# Patient Record
Sex: MALE | Race: WHITE | NOT HISPANIC OR LATINO | Employment: FULL TIME | URBAN - METROPOLITAN AREA
[De-identification: names, ages, dates, MRNs, and addresses within clinical notes are randomized per-mention and may not be internally consistent; named-entity substitution may affect disease eponyms.]

---

## 2020-03-02 ENCOUNTER — OFFICE VISIT (OUTPATIENT)
Dept: OBGYN CLINIC | Facility: CLINIC | Age: 60
End: 2020-03-02
Payer: COMMERCIAL

## 2020-03-02 VITALS
SYSTOLIC BLOOD PRESSURE: 143 MMHG | DIASTOLIC BLOOD PRESSURE: 83 MMHG | HEIGHT: 72 IN | WEIGHT: 218.2 LBS | HEART RATE: 54 BPM | BODY MASS INDEX: 29.55 KG/M2

## 2020-03-02 DIAGNOSIS — M24.812 INTERNAL DERANGEMENT OF LEFT SHOULDER: Primary | ICD-10-CM

## 2020-03-02 DIAGNOSIS — M25.512 ACUTE PAIN OF LEFT SHOULDER: ICD-10-CM

## 2020-03-02 PROCEDURE — 99203 OFFICE O/P NEW LOW 30 MIN: CPT | Performed by: ORTHOPAEDIC SURGERY

## 2020-03-02 RX ORDER — VERAPAMIL HYDROCHLORIDE 120 MG/1
120 TABLET, FILM COATED ORAL 3 TIMES DAILY
COMMUNITY
End: 2020-03-09

## 2020-03-02 RX ORDER — LEVOTHYROXINE SODIUM 175 UG/1
175 TABLET ORAL DAILY
COMMUNITY

## 2020-03-02 RX ORDER — ROSUVASTATIN CALCIUM 10 MG/1
10 TABLET, COATED ORAL DAILY
COMMUNITY

## 2020-03-02 NOTE — PROGRESS NOTES
Assessment/Plan:  1  Internal derangement of left shoulder  MRI shoulder left wo contrast   2  Acute pain of left shoulder  XR shoulder 2+ vw left    MRI shoulder left wo contrast       Scribe Attestation    I,:   Lukasz Diaz am acting as a scribe while in the presence of the attending physician :        I,:   Anabel Hood MD personally performed the services described in this documentation    as scribed in my presence :              Judith Mcfadden upon examination and review of the x-ray report of his left shoulder does give me concern for traumatic tear of his rotator cuff  He demonstrates a positive drop-arm sign, and is severely weak into abduction  He does demonstrate weakness and pain into external rotation that further provokes concern for a large rotator cuff tear  Given the acute traumatic nature of his injury, and his dysfunction of the left shoulder on clinical examination today do feel an MRI of the left shoulder to question a traumatic rotator cuff tear is warranted at this time  I did provide a prescription for this today  My office will help facilitate his appointment  I would like Judith Bogdan to follow up with me when he has the MRI of his left shoulder completed  In the meantime he may use his arm to tolerance however advised against any significant overhead or heavy lifting until we are able to review the images of his MRI  I did remark that should the MRI demonstrate a large rotator cuff tear there is series consideration to be discussed in regards to undergoing a left shoulder arthroscopy with rotator cuff repair  Judith Mcfadden did verbalize understanding to all the information provided to him, and had no further questions  Subjective:   Torsten Gurrola is a 62 y o  male who presents to the office today for initial evaluation of his left shoulder  He states that he suffered a fall from an elevated position on Saturday while he was on a 3 ft step ladder working on his barn    He states that he fell onto the elbow which subsequently jammed his shoulder then fell onto the shoulder  He states that at that time he did not feel a crack, snap, or pop into the shoulder until he got up and moved his arm  He has since been experiencing intermittent and severe sharp pain at the anterior, lateral aspect of the shoulder that does extend into the upper arm  He states that he is unable to reach overhead due to severe pain, and stiffness  He is also unable to forward flex his shoulder again due to severe pain, and weakness  He does note that he had an injury to his shoulder in a similar fashion approximately 35 years ago however did not undergo any surgical treatment  He did receive a corticosteroid injection 6 months after that injury that did provided with great relief  Today he denies any distal paresthesias  Review of Systems   Constitutional: Negative for chills, fever and unexpected weight change  HENT: Negative for hearing loss, nosebleeds and sore throat  Eyes: Negative for pain, redness and visual disturbance  Respiratory: Positive for cough  Negative for shortness of breath and wheezing  Cardiovascular: Negative for chest pain, palpitations and leg swelling  Gastrointestinal: Negative for abdominal pain, nausea and vomiting  Endocrine: Negative for polyphagia and polyuria  Genitourinary: Negative for dysuria and hematuria  Musculoskeletal: Positive for arthralgias  See HPI   Skin: Negative for rash and wound  Neurological: Negative for dizziness, numbness and headaches  Psychiatric/Behavioral: Negative for decreased concentration and suicidal ideas  The patient is not nervous/anxious            Past Medical History:   Diagnosis Date    Hypercholesterolemia     Pre-diabetes        Past Surgical History:   Procedure Laterality Date    CHOLECYSTECTOMY      CYST REMOVAL      on chest    EYE SURGERY      FINGER SURGERY Left     index of left hand       Family History Problem Relation Age of Onset    Diabetes Mother     Cancer Mother     Diabetes Father     Cancer Father     No Known Problems Sister     No Known Problems Brother     No Known Problems Maternal Aunt     No Known Problems Maternal Uncle     No Known Problems Paternal Aunt     No Known Problems Paternal Uncle     No Known Problems Maternal Grandmother     No Known Problems Maternal Grandfather     No Known Problems Paternal Grandmother     No Known Problems Paternal Grandfather        Social History     Occupational History    Not on file   Tobacco Use    Smoking status: Never Smoker    Smokeless tobacco: Never Used   Substance and Sexual Activity    Alcohol use: Never     Frequency: Never    Drug use: Never    Sexual activity: Not on file         Current Outpatient Medications:     levothyroxine 175 mcg tablet, Take 175 mcg by mouth daily, Disp: , Rfl:     metFORMIN (GLUCOPHAGE) 1000 MG tablet, Take 1,000 mg by mouth daily, Disp: , Rfl:     rosuvastatin (CRESTOR) 10 MG tablet, Take 10 mg by mouth daily, Disp: , Rfl:     verapamil (CALAN) 120 mg tablet, Take 120 mg by mouth 3 (three) times a day, Disp: , Rfl:     No Known Allergies    Objective:  Vitals:    03/02/20 0958   BP: 143/83   Pulse: (!) 54       Left Shoulder Exam     Tenderness   Left shoulder tenderness location: greater tuberosity  Range of Motion   External rotation: 50   Internal rotation 90 degrees: 40     Muscle Strength   Abduction: 2/5   Internal rotation: 5/5   External rotation: 4/5   Biceps: 4/5     Tests   Drop arm: positive    Other   Erythema: absent  Scars: absent  Sensation: normal  Pulse: present     Comments:  Empty can test:  Positive 2/5 strength, as significant pain            Physical Exam   Constitutional: He is oriented to person, place, and time  He appears well-developed and well-nourished  HENT:   Head: Normocephalic and atraumatic  Eyes: Conjunctivae are normal  Right eye exhibits no discharge  Left eye exhibits no discharge  Neck: Normal range of motion  Neck supple  Cardiovascular: Normal rate and intact distal pulses  Pulmonary/Chest: Effort normal  No respiratory distress  Musculoskeletal:   As noted in HPI   Neurological: He is alert and oriented to person, place, and time  Skin: Skin is warm and dry  Psychiatric: He has a normal mood and affect  His behavior is normal  Judgment and thought content normal    Vitals reviewed        I have personally reviewed pertinent films in PACS and my interpretation is as follows:    X-ray report of the left shoulder demonstrates no acute fracture or obvious osseus abnormalities

## 2020-03-03 ENCOUNTER — TELEPHONE (OUTPATIENT)
Dept: OBGYN CLINIC | Facility: CLINIC | Age: 60
End: 2020-03-03

## 2020-03-03 ENCOUNTER — TELEPHONE (OUTPATIENT)
Dept: OBGYN CLINIC | Facility: HOSPITAL | Age: 60
End: 2020-03-03

## 2020-03-09 ENCOUNTER — TRANSCRIBE ORDERS (OUTPATIENT)
Dept: ADMINISTRATIVE | Facility: HOSPITAL | Age: 60
End: 2020-03-09

## 2020-03-09 ENCOUNTER — APPOINTMENT (OUTPATIENT)
Dept: LAB | Facility: HOSPITAL | Age: 60
End: 2020-03-09
Attending: ORTHOPAEDIC SURGERY
Payer: COMMERCIAL

## 2020-03-09 ENCOUNTER — ANESTHESIA EVENT (OUTPATIENT)
Dept: PERIOP | Facility: HOSPITAL | Age: 60
End: 2020-03-09
Payer: COMMERCIAL

## 2020-03-09 ENCOUNTER — OFFICE VISIT (OUTPATIENT)
Dept: OBGYN CLINIC | Facility: CLINIC | Age: 60
End: 2020-03-09
Payer: COMMERCIAL

## 2020-03-09 ENCOUNTER — APPOINTMENT (OUTPATIENT)
Dept: PREADMISSION TESTING | Facility: HOSPITAL | Age: 60
End: 2020-03-09
Payer: COMMERCIAL

## 2020-03-09 VITALS
DIASTOLIC BLOOD PRESSURE: 79 MMHG | BODY MASS INDEX: 28.79 KG/M2 | SYSTOLIC BLOOD PRESSURE: 129 MMHG | HEART RATE: 67 BPM | WEIGHT: 212.6 LBS | HEIGHT: 72 IN

## 2020-03-09 DIAGNOSIS — S46.012A TRAUMATIC COMPLETE TEAR OF LEFT ROTATOR CUFF, INITIAL ENCOUNTER: Primary | ICD-10-CM

## 2020-03-09 DIAGNOSIS — Z01.812 PRE-OPERATIVE LABORATORY EXAMINATION: ICD-10-CM

## 2020-03-09 DIAGNOSIS — S46.012A TRAUMATIC TEAR OF LEFT ROTATOR CUFF, UNSPECIFIED TEAR EXTENT, INITIAL ENCOUNTER: ICD-10-CM

## 2020-03-09 LAB
ANION GAP SERPL CALCULATED.3IONS-SCNC: 7 MMOL/L (ref 4–13)
APTT PPP: 27 SECONDS (ref 25–32)
ATRIAL RATE: 66 BPM
BASOPHILS # BLD AUTO: 0.05 THOUSANDS/ΜL (ref 0–0.1)
BASOPHILS NFR BLD AUTO: 1 % (ref 0–1)
BUN SERPL-MCNC: 16 MG/DL (ref 5–25)
CALCIUM SERPL-MCNC: 9.3 MG/DL (ref 8.3–10.1)
CHLORIDE SERPL-SCNC: 102 MMOL/L (ref 100–108)
CO2 SERPL-SCNC: 28 MMOL/L (ref 21–32)
CREAT SERPL-MCNC: 0.77 MG/DL (ref 0.6–1.3)
EOSINOPHIL # BLD AUTO: 0.15 THOUSAND/ΜL (ref 0–0.61)
EOSINOPHIL NFR BLD AUTO: 2 % (ref 0–6)
ERYTHROCYTE [DISTWIDTH] IN BLOOD BY AUTOMATED COUNT: 12.2 % (ref 11.6–15.1)
GFR SERPL CREATININE-BSD FRML MDRD: 98 ML/MIN/1.73SQ M
GLUCOSE SERPL-MCNC: 94 MG/DL (ref 65–140)
HCT VFR BLD AUTO: 45.5 % (ref 36.5–49.3)
HGB BLD-MCNC: 14.9 G/DL (ref 12–17)
IMM GRANULOCYTES # BLD AUTO: 0.04 THOUSAND/UL (ref 0–0.2)
IMM GRANULOCYTES NFR BLD AUTO: 1 % (ref 0–2)
INR PPP: 0.95 (ref 0.91–1.09)
LYMPHOCYTES # BLD AUTO: 2.11 THOUSANDS/ΜL (ref 0.6–4.47)
LYMPHOCYTES NFR BLD AUTO: 34 % (ref 14–44)
MCH RBC QN AUTO: 33 PG (ref 26.8–34.3)
MCHC RBC AUTO-ENTMCNC: 32.7 G/DL (ref 31.4–37.4)
MCV RBC AUTO: 101 FL (ref 82–98)
MONOCYTES # BLD AUTO: 0.64 THOUSAND/ΜL (ref 0.17–1.22)
MONOCYTES NFR BLD AUTO: 10 % (ref 4–12)
NEUTROPHILS # BLD AUTO: 3.2 THOUSANDS/ΜL (ref 1.85–7.62)
NEUTS SEG NFR BLD AUTO: 52 % (ref 43–75)
NRBC BLD AUTO-RTO: 0 /100 WBCS
P AXIS: 43 DEGREES
PLATELET # BLD AUTO: 256 THOUSANDS/UL (ref 149–390)
PMV BLD AUTO: 9.4 FL (ref 8.9–12.7)
POTASSIUM SERPL-SCNC: 4.1 MMOL/L (ref 3.5–5.3)
PR INTERVAL: 142 MS
PROTHROMBIN TIME: 10.2 SECONDS (ref 9.8–12)
QRS AXIS: 75 DEGREES
QRSD INTERVAL: 98 MS
QT INTERVAL: 386 MS
QTC INTERVAL: 404 MS
RBC # BLD AUTO: 4.52 MILLION/UL (ref 3.88–5.62)
SODIUM SERPL-SCNC: 137 MMOL/L (ref 136–145)
T WAVE AXIS: 53 DEGREES
VENTRICULAR RATE: 66 BPM
WBC # BLD AUTO: 6.19 THOUSAND/UL (ref 4.31–10.16)

## 2020-03-09 PROCEDURE — 93005 ELECTROCARDIOGRAM TRACING: CPT

## 2020-03-09 PROCEDURE — 36415 COLL VENOUS BLD VENIPUNCTURE: CPT

## 2020-03-09 PROCEDURE — 99214 OFFICE O/P EST MOD 30 MIN: CPT | Performed by: ORTHOPAEDIC SURGERY

## 2020-03-09 PROCEDURE — 85025 COMPLETE CBC W/AUTO DIFF WBC: CPT

## 2020-03-09 PROCEDURE — 85610 PROTHROMBIN TIME: CPT

## 2020-03-09 PROCEDURE — 80048 BASIC METABOLIC PNL TOTAL CA: CPT

## 2020-03-09 PROCEDURE — 85730 THROMBOPLASTIN TIME PARTIAL: CPT

## 2020-03-09 PROCEDURE — 93010 ELECTROCARDIOGRAM REPORT: CPT | Performed by: INTERNAL MEDICINE

## 2020-03-09 RX ORDER — ALPRAZOLAM 0.5 MG/1
TABLET ORAL
COMMUNITY
Start: 2020-03-02 | End: 2020-06-23 | Stop reason: ALTCHOICE

## 2020-03-09 NOTE — PROGRESS NOTES
Assessment/Plan:  1  Traumatic complete tear of left rotator cuff, initial encounter         Scribe Attestation    I,:   Krystal Tomaszpatricia am acting as a scribe while in the presence of the attending physician :        I,:   Miah Everett MD personally performed the services described in this documentation    as scribed in my presence :              Yumiko Weathers upon examination and review of the MRI of his left shoulder does demonstrate a massive traumatic tear of the supraspinatus with retraction to the level of the acromion  Given the acute nature of his injury approximately 1 week ago there is no evidence of muscle atrophy or fatty infiltration of the muscle belly however I did remark that a traumatic tear such as his is susceptible to continued retraction, and increased difficulty for potential repair  He is significantly dysfunctional on clinical exam as he is unable to abduct his shoulder actively and demonstrates a positive drop-arm sign  He has severe weakness and pain with the empty can test   Given the severe nature of the tear on MRI and his dysfunction on clinical exam today I did discuss that the it is indicated for a left shoulder arthroscopy with rotator cuff repair, and subacromial decompression  I did discuss the procedure in detail as well as the risks including but not limited to bleeding, infection, nerve injury resulting in weakness and pain, fracture, stiffness, blood clot, failure surgery, recurrent injury, and need for further surgery  Can at the verbalize understanding to all the information provided to him today, and did provide signed consent for a left shoulder arthroscopy with rotator cuff repair and subacromial decompression  I did indicate can't that it is best to try and address this issue sooner than later  He will speak with my surgical scheduler to set up a date and time potentially tomorrow 3/10/2020 for the surgical procedure    He will be fitted with a postoperative sling today by my   He will meet with my surgical scheduler to set up a time for surgical procedure  Abdiel Trimble will follow up with his primary care physician for preoperative labs and clearance today 3/9/2020  I will see Abdiel Trimble back on the date of his surgery  Subjective:   Meek Birmingham is a 62 y o  male who presents to the office today for follow-up evaluation of his left shoulder  I can unfortunately suffered a fall off a 3 ft step bladder on Saturday 2/29/2020 and states that he jammed his left shoulder while he was working on his barn  He states that he is still experiencing intermittent and moderate and sometimes severe sharp pain into the shoulder that does extend into the upper arm  He states that he has severe weakness into abduction and forward flexion is unable to lift his hand overhead  He notes that his pain is better while at rest and with his arm close to his side however notes that he is having difficulty sleeping due to the pain that he is experiencing  Today he denies any distal paresthesias into the left upper extremity  Briana did have an MRI of the left shoulder completed to be reviewed today  Review of Systems   Constitutional: Negative for chills, fever and unexpected weight change  HENT: Negative for hearing loss, nosebleeds and sore throat  Eyes: Negative for pain, redness and visual disturbance  Respiratory: Negative for cough, shortness of breath and wheezing  Cardiovascular: Negative for chest pain, palpitations and leg swelling  Gastrointestinal: Negative for abdominal pain, nausea and vomiting  Endocrine: Negative for polyphagia and polyuria  Genitourinary: Negative for dysuria and hematuria  Musculoskeletal: Positive for arthralgias, joint swelling and myalgias  See HPI   Skin: Negative for rash and wound  Neurological: Negative for dizziness, numbness and headaches     Psychiatric/Behavioral: Negative for decreased concentration and suicidal ideas  The patient is not nervous/anxious            Past Medical History:   Diagnosis Date    Hypercholesterolemia     Pre-diabetes        Past Surgical History:   Procedure Laterality Date    CHOLECYSTECTOMY      CYST REMOVAL      on chest    EYE SURGERY      FINGER SURGERY Left     index of left hand       Family History   Problem Relation Age of Onset    Diabetes Mother     Cancer Mother     Diabetes Father     Cancer Father     No Known Problems Sister     No Known Problems Brother     No Known Problems Maternal Aunt     No Known Problems Maternal Uncle     No Known Problems Paternal Aunt     No Known Problems Paternal Uncle     No Known Problems Maternal Grandmother     No Known Problems Maternal Grandfather     No Known Problems Paternal Grandmother     No Known Problems Paternal Grandfather        Social History     Occupational History    Not on file   Tobacco Use    Smoking status: Never Smoker    Smokeless tobacco: Never Used   Substance and Sexual Activity    Alcohol use: Never     Frequency: Never    Drug use: Never    Sexual activity: Not on file         Current Outpatient Medications:     levothyroxine 175 mcg tablet, Take 175 mcg by mouth daily, Disp: , Rfl:     metFORMIN (GLUCOPHAGE) 1000 MG tablet, Take 1,000 mg by mouth daily, Disp: , Rfl:     rosuvastatin (CRESTOR) 10 MG tablet, Take 10 mg by mouth daily, Disp: , Rfl:     verapamil (CALAN-SR) 180 mg CR tablet, , Disp: , Rfl:     ALPRAZolam (XANAX) 0 5 mg tablet, TAKE 1 TO 2 TABLETS BY MOUTH 1 HOUR PRIOR TO PROCEDURE, Disp: , Rfl:     No Known Allergies    Objective:  Vitals:    03/09/20 0834   BP: 129/79   Pulse: 67       Left Shoulder Exam     Range of Motion   Active abduction: 40   External rotation: 50   Internal rotation 0 degrees: Sacrum     Muscle Strength   Abduction: 2/5   Internal rotation: 5/5   External rotation: 4/5     Tests   Impingement: positive  Drop arm: positive    Other   Erythema: absent  Scars: absent  Sensation: normal  Pulse: present             Physical Exam   Constitutional: He is oriented to person, place, and time  He appears well-developed and well-nourished  HENT:   Head: Normocephalic and atraumatic  Eyes: Conjunctivae are normal  Right eye exhibits no discharge  Left eye exhibits no discharge  Neck: Normal range of motion  Neck supple  Cardiovascular: Normal rate and intact distal pulses  Pulmonary/Chest: Effort normal  No respiratory distress  Musculoskeletal:   As noted in HPI   Neurological: He is alert and oriented to person, place, and time  Skin: Skin is warm and dry  Psychiatric: He has a normal mood and affect  His behavior is normal  Judgment and thought content normal    Vitals reviewed  I have personally reviewed pertinent films in PACS and my interpretation is as follows:    MRI of the left shoulder demonstrates a large complete tear of the supraspinatus tendon with 3 cm retraction in relation to the greater tuberosity  No evidence of muscle atrophy   Small partial articular surface tear of the infraspinatus with no evidence of retraction

## 2020-03-09 NOTE — H&P (VIEW-ONLY)
Assessment/Plan:  1  Traumatic complete tear of left rotator cuff, initial encounter         Scribe Attestation    I,:   Adina Marroquin am acting as a scribe while in the presence of the attending physician :        I,:   Morenita Jansen MD personally performed the services described in this documentation    as scribed in my presence :              Jaya Fuller upon examination and review of the MRI of his left shoulder does demonstrate a massive traumatic tear of the supraspinatus with retraction to the level of the acromion  Given the acute nature of his injury approximately 1 week ago there is no evidence of muscle atrophy or fatty infiltration of the muscle belly however I did remark that a traumatic tear such as his is susceptible to continued retraction, and increased difficulty for potential repair  He is significantly dysfunctional on clinical exam as he is unable to abduct his shoulder actively and demonstrates a positive drop-arm sign  He has severe weakness and pain with the empty can test   Given the severe nature of the tear on MRI and his dysfunction on clinical exam today I did discuss that the it is indicated for a left shoulder arthroscopy with rotator cuff repair, and subacromial decompression  I did discuss the procedure in detail as well as the risks including but not limited to bleeding, infection, nerve injury resulting in weakness and pain, fracture, stiffness, blood clot, failure surgery, recurrent injury, and need for further surgery  Can at the verbalize understanding to all the information provided to him today, and did provide signed consent for a left shoulder arthroscopy with rotator cuff repair and subacromial decompression  I did indicate can't that it is best to try and address this issue sooner than later  He will speak with my surgical scheduler to set up a date and time potentially tomorrow 3/10/2020 for the surgical procedure    He will be fitted with a postoperative sling today by my   He will meet with my surgical scheduler to set up a time for surgical procedure  Arnulfo Traore will follow up with his primary care physician for preoperative labs and clearance today 3/9/2020  I will see Arnulfo Traore back on the date of his surgery  Subjective:   Carson Kaye is a 62 y o  male who presents to the office today for follow-up evaluation of his left shoulder  I can unfortunately suffered a fall off a 3 ft step bladder on Saturday 2/29/2020 and states that he jammed his left shoulder while he was working on his barn  He states that he is still experiencing intermittent and moderate and sometimes severe sharp pain into the shoulder that does extend into the upper arm  He states that he has severe weakness into abduction and forward flexion is unable to lift his hand overhead  He notes that his pain is better while at rest and with his arm close to his side however notes that he is having difficulty sleeping due to the pain that he is experiencing  Today he denies any distal paresthesias into the left upper extremity  Briana did have an MRI of the left shoulder completed to be reviewed today  Review of Systems   Constitutional: Negative for chills, fever and unexpected weight change  HENT: Negative for hearing loss, nosebleeds and sore throat  Eyes: Negative for pain, redness and visual disturbance  Respiratory: Negative for cough, shortness of breath and wheezing  Cardiovascular: Negative for chest pain, palpitations and leg swelling  Gastrointestinal: Negative for abdominal pain, nausea and vomiting  Endocrine: Negative for polyphagia and polyuria  Genitourinary: Negative for dysuria and hematuria  Musculoskeletal: Positive for arthralgias, joint swelling and myalgias  See HPI   Skin: Negative for rash and wound  Neurological: Negative for dizziness, numbness and headaches     Psychiatric/Behavioral: Negative for decreased concentration and suicidal ideas  The patient is not nervous/anxious            Past Medical History:   Diagnosis Date    Hypercholesterolemia     Pre-diabetes        Past Surgical History:   Procedure Laterality Date    CHOLECYSTECTOMY      CYST REMOVAL      on chest    EYE SURGERY      FINGER SURGERY Left     index of left hand       Family History   Problem Relation Age of Onset    Diabetes Mother     Cancer Mother     Diabetes Father     Cancer Father     No Known Problems Sister     No Known Problems Brother     No Known Problems Maternal Aunt     No Known Problems Maternal Uncle     No Known Problems Paternal Aunt     No Known Problems Paternal Uncle     No Known Problems Maternal Grandmother     No Known Problems Maternal Grandfather     No Known Problems Paternal Grandmother     No Known Problems Paternal Grandfather        Social History     Occupational History    Not on file   Tobacco Use    Smoking status: Never Smoker    Smokeless tobacco: Never Used   Substance and Sexual Activity    Alcohol use: Never     Frequency: Never    Drug use: Never    Sexual activity: Not on file         Current Outpatient Medications:     levothyroxine 175 mcg tablet, Take 175 mcg by mouth daily, Disp: , Rfl:     metFORMIN (GLUCOPHAGE) 1000 MG tablet, Take 1,000 mg by mouth daily, Disp: , Rfl:     rosuvastatin (CRESTOR) 10 MG tablet, Take 10 mg by mouth daily, Disp: , Rfl:     verapamil (CALAN-SR) 180 mg CR tablet, , Disp: , Rfl:     ALPRAZolam (XANAX) 0 5 mg tablet, TAKE 1 TO 2 TABLETS BY MOUTH 1 HOUR PRIOR TO PROCEDURE, Disp: , Rfl:     No Known Allergies    Objective:  Vitals:    03/09/20 0834   BP: 129/79   Pulse: 67       Left Shoulder Exam     Range of Motion   Active abduction: 40   External rotation: 50   Internal rotation 0 degrees: Sacrum     Muscle Strength   Abduction: 2/5   Internal rotation: 5/5   External rotation: 4/5     Tests   Impingement: positive  Drop arm: positive    Other   Erythema: absent  Scars: absent  Sensation: normal  Pulse: present             Physical Exam   Constitutional: He is oriented to person, place, and time  He appears well-developed and well-nourished  HENT:   Head: Normocephalic and atraumatic  Eyes: Conjunctivae are normal  Right eye exhibits no discharge  Left eye exhibits no discharge  Neck: Normal range of motion  Neck supple  Cardiovascular: Normal rate and intact distal pulses  Pulmonary/Chest: Effort normal  No respiratory distress  Musculoskeletal:   As noted in HPI   Neurological: He is alert and oriented to person, place, and time  Skin: Skin is warm and dry  Psychiatric: He has a normal mood and affect  His behavior is normal  Judgment and thought content normal    Vitals reviewed  I have personally reviewed pertinent films in PACS and my interpretation is as follows:    MRI of the left shoulder demonstrates a large complete tear of the supraspinatus tendon with 3 cm retraction in relation to the greater tuberosity  No evidence of muscle atrophy   Small partial articular surface tear of the infraspinatus with no evidence of retraction

## 2020-03-09 NOTE — PRE-PROCEDURE INSTRUCTIONS
My Surgical Experience    The following information was developed to assist you to prepare for your operation  What do I need to do before coming to the hospital?   Arrange for a responsible person to drive you to and from the hospital    Arrange care for your children at home  Children are not allowed in the recovery areas of the hospital   Plan to wear clothing that is easy to put on and take off  If you are having shoulder surgery, wear a shirt that buttons or zippers in the front  Bathing  o Shower the evening before and the morning of your surgery with an antibacterial soap  Please refer to the Pre Op Showering Instructions for Surgery Patients Sheet   o Remove nail polish and all body piercing jewelry  o Do not shave any body part for at least 24 hours before surgery-this includes face, arms, legs and upper body  Food  o Nothing to eat or drink after midnight the night before your surgery  This includes candy and chewing gum  o Exception: If your surgery is after 12:00pm (noon), you may have clear liquids such as 7-Up®, ginger ale, apple or cranberry juice, Jell-O®, water, or clear broth until 8:00 am  o Do not drink milk or juice with pulp on the morning before surgery  o Do not drink alcohol 24 hours before surgery  Medicine  o Follow instructions you received from your surgeon about which medicines you may take on the day of surgery  o If instructed to take medicine on the morning of surgery, take pills with just a small sip of water  Call your prescribing doctor for specific infroamtion on what to do if you take insulin    What should I bring to the hospital?    Bring:  Orest Pepper or a walker, if you have them, for foot or knee surgery   A list of the daily medicines, vitamins, minerals, herbals and nutritional supplements you take   Include the dosages of medicines and the time you take them each day   Glasses, dentures or hearing aids   Minimal clothing; you will be wearing hospital sleepwear   Photo ID; required to verify your identity   If you have a Living Will or Power of , bring a copy of the documents   If you have an ostomy, bring an extra pouch and any supplies you use    Do not bring   Medicines or inhalers   Money, valuables or jewelry    What other information should I know about the day of surgery?  Notify your surgeons if you develop a cold, sore throat, cough, fever, rash or any other illness   Report to the Ambulatory Surgical/Same Day Surgery Unit   You will be instructed to stop at Registration only if you have not been pre-registered   Inform your  fi they do not stay that they will be asked by the staff to leave a phone number where they can be reached   Be available to be reached before surgery  In the event the operating room schedule changes, you may be asked to come in earlier or later than expected    *It is important to tell your doctor and others involved in your health care if you are taking or have been taking any non-prescription drugs, vitamins, minerals, herbals or other nutritional supplements  Any of these may interact with some food or medicines and cause a reaction      Pre-Surgery Instructions:   Medication Instructions    levothyroxine 175 mcg tablet Instructed patient per Anesthesia Guidelines   metFORMIN (GLUCOPHAGE) 1000 MG tablet Instructed patient per Anesthesia Guidelines   rosuvastatin (CRESTOR) 10 MG tablet Instructed patient per Anesthesia Guidelines   verapamil (CALAN-SR) 180 mg CR tablet Instructed patient per Anesthesia Guidelines  To take verapamil and synthroid a m  Of surgery

## 2020-03-10 ENCOUNTER — HOSPITAL ENCOUNTER (OUTPATIENT)
Facility: HOSPITAL | Age: 60
Setting detail: OUTPATIENT SURGERY
Discharge: HOME | End: 2020-03-10
Attending: ORTHOPAEDIC SURGERY | Admitting: ORTHOPAEDIC SURGERY
Payer: COMMERCIAL

## 2020-03-10 ENCOUNTER — TELEPHONE (OUTPATIENT)
Dept: OBGYN CLINIC | Facility: CLINIC | Age: 60
End: 2020-03-10

## 2020-03-10 ENCOUNTER — ANESTHESIA (OUTPATIENT)
Dept: PERIOP | Facility: HOSPITAL | Age: 60
End: 2020-03-10
Payer: COMMERCIAL

## 2020-03-10 VITALS
TEMPERATURE: 97.6 F | WEIGHT: 210 LBS | BODY MASS INDEX: 28.48 KG/M2 | HEART RATE: 81 BPM | SYSTOLIC BLOOD PRESSURE: 130 MMHG | OXYGEN SATURATION: 95 % | DIASTOLIC BLOOD PRESSURE: 76 MMHG | RESPIRATION RATE: 16 BRPM

## 2020-03-10 DIAGNOSIS — S46.012D TRAUMATIC COMPLETE TEAR OF LEFT ROTATOR CUFF, SUBSEQUENT ENCOUNTER: Primary | ICD-10-CM

## 2020-03-10 LAB — GLUCOSE SERPL-MCNC: 102 MG/DL (ref 65–140)

## 2020-03-10 PROCEDURE — 82948 REAGENT STRIP/BLOOD GLUCOSE: CPT

## 2020-03-10 PROCEDURE — 29828 SHO ARTHRS SRG BICP TENODSIS: CPT | Performed by: PHYSICIAN ASSISTANT

## 2020-03-10 PROCEDURE — 29826 SHO ARTHRS SRG DECOMPRESSION: CPT | Performed by: PHYSICIAN ASSISTANT

## 2020-03-10 PROCEDURE — 29827 SHO ARTHRS SRG RT8TR CUF RPR: CPT | Performed by: PHYSICIAN ASSISTANT

## 2020-03-10 PROCEDURE — 29826 SHO ARTHRS SRG DECOMPRESSION: CPT | Performed by: ORTHOPAEDIC SURGERY

## 2020-03-10 PROCEDURE — 29827 SHO ARTHRS SRG RT8TR CUF RPR: CPT | Performed by: ORTHOPAEDIC SURGERY

## 2020-03-10 PROCEDURE — C1713 ANCHOR/SCREW BN/BN,TIS/BN: HCPCS | Performed by: ORTHOPAEDIC SURGERY

## 2020-03-10 PROCEDURE — C9290 INJ, BUPIVACAINE LIPOSOME: HCPCS | Performed by: ANESTHESIOLOGY

## 2020-03-10 PROCEDURE — 29828 SHO ARTHRS SRG BICP TENODSIS: CPT | Performed by: ORTHOPAEDIC SURGERY

## 2020-03-10 DEVICE — SYSTEM IMPLANT SPEEDBRIDGE W/4.75 BCMPS SWIVELOCK C: Type: IMPLANTABLE DEVICE | Site: SHOULDER | Status: FUNCTIONAL

## 2020-03-10 RX ORDER — ONDANSETRON 2 MG/ML
4 INJECTION INTRAMUSCULAR; INTRAVENOUS ONCE AS NEEDED
Status: DISCONTINUED | OUTPATIENT
Start: 2020-03-10 | End: 2020-03-10 | Stop reason: HOSPADM

## 2020-03-10 RX ORDER — OXYCODONE HYDROCHLORIDE AND ACETAMINOPHEN 5; 325 MG/1; MG/1
1 TABLET ORAL EVERY 4 HOURS PRN
Qty: 15 TABLET | Refills: 0 | Status: SHIPPED | OUTPATIENT
Start: 2020-03-10 | End: 2020-03-18

## 2020-03-10 RX ORDER — CEFAZOLIN SODIUM 2 G/50ML
2000 SOLUTION INTRAVENOUS ONCE
Status: DISCONTINUED | OUTPATIENT
Start: 2020-03-10 | End: 2020-03-10 | Stop reason: HOSPADM

## 2020-03-10 RX ORDER — LIDOCAINE HYDROCHLORIDE 10 MG/ML
INJECTION, SOLUTION EPIDURAL; INFILTRATION; INTRACAUDAL; PERINEURAL
Status: COMPLETED | OUTPATIENT
Start: 2020-03-10 | End: 2020-03-10

## 2020-03-10 RX ORDER — BUPIVACAINE HYDROCHLORIDE 5 MG/ML
INJECTION, SOLUTION PERINEURAL
Status: COMPLETED | OUTPATIENT
Start: 2020-03-10 | End: 2020-03-10

## 2020-03-10 RX ORDER — PROPOFOL 10 MG/ML
INJECTION, EMULSION INTRAVENOUS AS NEEDED
Status: DISCONTINUED | OUTPATIENT
Start: 2020-03-10 | End: 2020-03-10 | Stop reason: SURG

## 2020-03-10 RX ORDER — ONDANSETRON 2 MG/ML
INJECTION INTRAMUSCULAR; INTRAVENOUS AS NEEDED
Status: DISCONTINUED | OUTPATIENT
Start: 2020-03-10 | End: 2020-03-10 | Stop reason: SURG

## 2020-03-10 RX ORDER — SODIUM CHLORIDE, SODIUM LACTATE, POTASSIUM CHLORIDE, CALCIUM CHLORIDE 600; 310; 30; 20 MG/100ML; MG/100ML; MG/100ML; MG/100ML
125 INJECTION, SOLUTION INTRAVENOUS CONTINUOUS
Status: DISCONTINUED | OUTPATIENT
Start: 2020-03-10 | End: 2020-03-10 | Stop reason: HOSPADM

## 2020-03-10 RX ORDER — MIDAZOLAM HYDROCHLORIDE 2 MG/2ML
INJECTION, SOLUTION INTRAMUSCULAR; INTRAVENOUS
Status: COMPLETED | OUTPATIENT
Start: 2020-03-10 | End: 2020-03-10

## 2020-03-10 RX ORDER — EPHEDRINE SULFATE 50 MG/ML
INJECTION INTRAVENOUS AS NEEDED
Status: DISCONTINUED | OUTPATIENT
Start: 2020-03-10 | End: 2020-03-10 | Stop reason: SURG

## 2020-03-10 RX ORDER — FENTANYL CITRATE/PF 50 MCG/ML
25 SYRINGE (ML) INJECTION
Status: DISCONTINUED | OUTPATIENT
Start: 2020-03-10 | End: 2020-03-10 | Stop reason: HOSPADM

## 2020-03-10 RX ORDER — LIDOCAINE HYDROCHLORIDE 10 MG/ML
INJECTION, SOLUTION EPIDURAL; INFILTRATION; INTRACAUDAL; PERINEURAL AS NEEDED
Status: DISCONTINUED | OUTPATIENT
Start: 2020-03-10 | End: 2020-03-10 | Stop reason: SURG

## 2020-03-10 RX ADMIN — LIDOCAINE HYDROCHLORIDE 50 MG: 10 INJECTION, SOLUTION EPIDURAL; INFILTRATION; INTRACAUDAL; PERINEURAL at 14:38

## 2020-03-10 RX ADMIN — PHENYLEPHRINE HYDROCHLORIDE 100 MCG: 10 INJECTION INTRAVENOUS at 15:44

## 2020-03-10 RX ADMIN — EPHEDRINE SULFATE 5 MG: 50 INJECTION, SOLUTION INTRAVENOUS at 15:28

## 2020-03-10 RX ADMIN — SODIUM CHLORIDE, SODIUM LACTATE, POTASSIUM CHLORIDE, AND CALCIUM CHLORIDE 125 ML/HR: .6; .31; .03; .02 INJECTION, SOLUTION INTRAVENOUS at 11:37

## 2020-03-10 RX ADMIN — SODIUM CHLORIDE, SODIUM LACTATE, POTASSIUM CHLORIDE, AND CALCIUM CHLORIDE: .6; .31; .03; .02 INJECTION, SOLUTION INTRAVENOUS at 14:33

## 2020-03-10 RX ADMIN — PROPOFOL 200 MG: 10 INJECTION, EMULSION INTRAVENOUS at 14:38

## 2020-03-10 RX ADMIN — MIDAZOLAM HYDROCHLORIDE 2 MG: 1 INJECTION, SOLUTION INTRAMUSCULAR; INTRAVENOUS at 13:30

## 2020-03-10 RX ADMIN — PHENYLEPHRINE HYDROCHLORIDE 100 MCG: 10 INJECTION INTRAVENOUS at 15:37

## 2020-03-10 RX ADMIN — BUPIVACAINE HYDROCHLORIDE 15 ML: 5 INJECTION, SOLUTION PERINEURAL at 13:30

## 2020-03-10 RX ADMIN — EPHEDRINE SULFATE 5 MG: 50 INJECTION, SOLUTION INTRAVENOUS at 15:16

## 2020-03-10 RX ADMIN — EPHEDRINE SULFATE 5 MG: 50 INJECTION, SOLUTION INTRAVENOUS at 15:11

## 2020-03-10 RX ADMIN — ONDANSETRON 4 MG: 2 INJECTION INTRAMUSCULAR; INTRAVENOUS at 14:45

## 2020-03-10 RX ADMIN — PHENYLEPHRINE HYDROCHLORIDE 100 MCG: 10 INJECTION INTRAVENOUS at 15:28

## 2020-03-10 RX ADMIN — LIDOCAINE HYDROCHLORIDE 1 ML: 10 INJECTION, SOLUTION EPIDURAL; INFILTRATION; INTRACAUDAL; PERINEURAL at 13:30

## 2020-03-10 NOTE — ANESTHESIA PREPROCEDURE EVALUATION
Review of Systems/Medical History  Patient summary reviewed    No history of anesthetic complications     Cardiovascular  Exercise tolerance (METS): >4,  Hyperlipidemia, No dysrhythmias , No angina ,    Pulmonary  Sleep apnea ,        GI/Hepatic  Negative GI/hepatic ROS               Endo/Other  Diabetes , History of thyroid disease ,      GYN       Hematology  Negative hematology ROS      Musculoskeletal       Neurology    Headaches,    Psychology           Physical Exam    Airway    Mallampati score: II  TM Distance: >3 FB  Neck ROM: full     Dental       Cardiovascular  Rhythm: regular, Rate: normal,     Pulmonary  Breath sounds clear to auscultation,     Other Findings        Anesthesia Plan  ASA Score- 2     Anesthesia Type- general and regional with ASA Monitors  Additional Monitors:   Airway Plan:         Plan Factors-  Patient did not smoke on day of surgery  Induction- intravenous  Postoperative Plan- Plan for postoperative opioid use  Planned trial extubation    Informed Consent- Anesthetic plan and risks discussed with patient  I personally reviewed this patient with the CRNA  Discussed and agreed on the Anesthesia Plan with the CRNA  Tyler Seth

## 2020-03-10 NOTE — TELEPHONE ENCOUNTER
Spoke with Cyndi Pereira, RN with [facility] and she voiced some concerns about Leanna Rubinstein after he had placed the call to them to expedite his surgery  She said that he had called in crying and stating that he couldn't bare the pain  She was able to approve the surgery  She said she was going to reach out to Leanna Rubinstein to follow up after his call

## 2020-03-10 NOTE — ANESTHESIA PROCEDURE NOTES
Peripheral Block    Patient location during procedure: holding area  Start time: 3/10/2020 1:25 PM  Reason for block: at surgeon's request and post-op pain management  Staffing  Performed: anesthesiologist   Preanesthetic Checklist  Completed: patient identified, site marked, surgical consent, pre-op evaluation, timeout performed, IV checked, risks and benefits discussed and monitors and equipment checked  Peripheral Block  Patient position: supine  Prep: ChloraPrep  Patient monitoring: continuous pulse ox and frequent blood pressure checks  Block type: interscalene  Laterality: left  Procedures: ultrasound guided, Ultrasound guidance required for the procedure to increase accuracy and safety of medication placement and decrease risk of complications  bupivacaine (MARCAINE) 0 5 % perineural infiltration, 15 mL  lidocaine (PF) (XYLOCAINE-MPF) 1 % infiltration, 1 mL  midazolam (VERSED) 2 mg/2 mL IV, 2 mg  Needle  Needle type: [employer]   Needle gauge: 22 G  Needle length: 5 cm  Needle localization: ultrasound guidance and nerve stimulator  Needle insertion depth: 3 cm  Test dose: negative  Assessment  Injection assessment: incremental injection and local visualized surrounding nerve on ultrasound  Paresthesia pain: immediately resolved  Heart rate change: no  Slow fractionated injection: yes  Post-procedure:  site cleaned  patient tolerated the procedure well with no immediate complications  Additional Notes  Exparel 7 ml mixed with 0 5% bupivacaine 15 ml for the block

## 2020-03-10 NOTE — INTERVAL H&P NOTE
H&P reviewed  After examining the patient I find no changes in the patients condition since the H&P had been written      Vitals:    03/10/20 1108   BP: 163/100   Pulse: 74   Resp: 18   Temp: (!) 97 3 °F (36 3 °C)   SpO2: 97%

## 2020-03-10 NOTE — OP NOTE
OPERATIVE REPORT  PATIENT NAME: Luca See    :  1960  MRN: [id number]  Pt Location: [facility] OR ROOM 02    SURGERY DATE: 3/10/2020    Surgeon(s) and Role:     * Jose Calix MD - Primary     * Tyrone Hanna PA-C - Assisting necessary for the procedure for assistance with minimally invasive arthroscopic techniques for rotator cuff repair and biceps tenodesis and subacromial decompression for suture management as well as camera utilization as well as placement of the anchors  Juan M BARRETO  And OTC 2nd assistant    Preop Diagnosis:  Traumatic tear of left rotator cuff, unspecified tear extent, initial encounter [S46 012A]    Post-Op Diagnosis Codes:     * Traumatic tear of left rotator cuff, unspecified tear extent, initial encounter [S46 012A] with long head of biceps tendon tear and subacromial impingement    Procedure:  Left shoulder arthroscopy with rotator cuff repair utilizing speed bridge technique from [employer] with 4 anchors and 2 FiberTape and 3 FiberWire sutures as well as biceps tenodesis utilizing the anterior lateral row anchor and 2 FiberWire sutures as well as subacromial decompression    Specimen(s):  * No specimens in log *    Estimated Blood Loss:   Minimal    Drains:  * No LDAs found *    Anesthesia Type:   Regional with general    Operative Indications:  Traumatic tear of left rotator cuff, unspecified tear extent, initial encounter Gordan Schwab is a 61-year-old male who has been suffering with left shoulder pain since he suffered a fall  MRI demonstrated a traumatic supraspinatus tendon tear  He had severe weakness in abduction and wished to undergo a left shoulder arthroscopy for rotator cuff repair  He understood the risks and benefits of that procedure wished to go ahead    The risks are inclusive of but not limited to infection, stiffness, nerve injury causing numbness pain and weakness, worsening of symptoms, failure to regain full strength and ability, recurrent tear, and need for further surgery  Operative Findings:  Left shoulder exam under anesthesia demonstrated passive forward flexion and abduction each to 160° external rotation to 70° internal rotation is 60°  Intra-articular findings demonstrated good appearance of articular cartilage in the glenohumeral joint however there was a hemarthrosis present as this was a traumatic event  No loose bodies in the axillary pouch  The subscapularis tendon was intact  Supraspinatus tendon had an obvious massive tear with significant retraction and a stump that was left on the greater tuberosity  There was also high-grade partial-thickness tearing of the long head of biceps tendon  We did demonstrated type 2 subacromial spur  We performed a biceps tenodesis in addition to the rotator cuff repair  Complications:   None    Procedure and Technique:  Samantha Sandoval was taken to the operating room and placed supine on the OR table  He was given preoperative IV antibiotics as well as preoperative regional anesthesia by the attending anesthesiologist   General anesthesia was induced and he was brought comfortably and safely into the beach chair position all parts well padded and the head neutral in the head reynolds  The left shoulder was taken through exam under anesthesia as described above  The left upper extremity was prepped and draped in usual sterile fashion  A surgical time-out was taken  We created the posterior portal with 11 blade  Diagnostic arthroscopy begun and an anterior portal was made in the rotator interval with 11 blade  We demonstrated good appearance of articular cartilage in the glenohumeral joint with no loose bodies in the axillary pouch although there was a hemarthrosis  There was a massive tear of the supraspinatus tendon with significant retraction although very good tendon excursion was later demonstrated  The subscapularis tendon was intact    The long head of biceps tendon had significant high-grade partial tearing  We did create a lateral portal with 11 blade  We did then began debridement of the ends of the tendon tears with a shaver to create a stable rim  We then tagged the long head of biceps tendon with a FiberWire suture in mattress configuration and performed a tenotomy with use of the Wand  We then debrided the greater tuberosity down to a bleeding bed of bone utilizing a bur to enhance healing  We then went to the subacromial space where we demonstrated type 3 subacromial spur  We performed an acromioplasty on this with use of a bur for a subacromial decompression  We then placed the 1st anterior medial row anchor and passed the FiberTape and FiberWire sutures from that anchor in horizontal mattress configuration  We then placed the 2nd posterior medial row anchor adjacent to articular margin and passed the FiberTape and FiberWire sutures once again in horizontal mattress configuration  We then took 1 FiberTape suture from each medial row anchor and placed them into an anterior lateral row anchor  This brought the tendon over nicely  We then took the remaining 2 FiberTape sutures and placed them into a posterior lateral row anchor  We tied the FiberWire sutures that were passed from the medial row with arthroscopic knot tying techniques  We passed the posterior lateral row FiberWire from that anchor through the posterior aspect of supraspinatus tendon tear and tied that down for additional fixation  We then passed the anterior lateral row FiberWire suture into the long head of biceps tendon in mattress configuration and tied that down with arthroscopic knot tying techniques and then tied those sutures to the previously thrown sutures in the bone long head of biceps tendon for distal fixation  We were quite pleased with our result and then viewed laterally to demonstrate excellent appearance of the repair of both the biceps tendon and the supraspinatus tendon    We then removed the arthroscopic equipment and closed the portals with 4-0 nylon suture  Dry, sterile dressings were applied with a sling  He tolerated the procedure well and transferred to recovery room stable condition  He will follow up in 1 week for suture removal   He will be on the rotator cuff repair rehabilitation protocol  He will be in a sling for 6 weeks     I was present for the entire procedure and A qualified resident physician was not available    Patient Disposition:  PACU     SIGNATURE: Anne Sandy MD  DATE: March 10, 2020  TIME: 3:47 PM

## 2020-03-10 NOTE — PERIOPERATIVE NURSING NOTE
Fingers remain warm and pink limited movement unable to move arm  Normal blanching  OOB to bathroom gait steady voided without difficulty  Tolerated fluids discharge directions reviewed with patient and wife  Pain remains zero

## 2020-03-10 NOTE — PERIOPERATIVE NURSING NOTE
Received from OR fully awake and alert dressing to left shoulder dry and intact  Sling on ice on shoulder  Family at bedside   Taking po fluids

## 2020-03-10 NOTE — TELEPHONE ENCOUNTER
I contacted Betty Zhang to let him know that I have still not heard back from [address] [place] [address] [address] with regards to his approval  I explained that I have sent the expedited request for a quick response, however, their TAT is 3 days  We were trying to get the approval in less than 24 hours  He said that he does not want to put off the surgery as he is in a great deal of pain  I expressed that we can not proceed without an approval   He said he is comfortable with moving forward and knowing that the surgery will be approved  He asked if there is a number he can call to speak with his insurance and I provided him with the number for [facility]  He said he was asked to arrive at 11 this morning and I told him that I would again contact his insurance to check if the approval before then     Our call was at 8:45 AM

## 2020-03-10 NOTE — DISCHARGE INSTRUCTIONS
Instruction Sheet Following RTC repair     Sling:   Wear your sling at all times after your surgery (this includes sleeping), except for when you are doing pendulum exercises, showering, or physical therapy  Additionally, you should not carry anything heavier than a pencil in your hand  Dressing:   Remove all cotton and yellow gauze 48 hours after your surgery  You do not need to put a new dressing on your wound; place Band-Aids on your wound  Showering: You may shower 48 hours after surgery  Please use CAUTION!! Be careful not to slip and fall  The effects of anesthesia and/or medication may make you drowsy or light-headed  Do not soak in a bathtub, hot tub, or pool until the doctor tells you it is O K , to do so  Once you are done showering pat the wound dry and apply a Band-Aid  While in the shower you must keep the arm across the front of the body as if it were still in the sling  Sleeping:   You will most likely have difficulty sleeping in the first few weeks after surgery  Most people find it more comfortable to sleep in a reclining position  You can either sleep in a recliner chair or create this position with pillows  Ice:   You can ice the shoulder to reduce swelling and discomfort  Do not ice the shoulder more than 20 minutes at a time  Let the shoulder warm up before reapplication  Avoid getting you wound wet  If you have a Cryocuff you may keep this on continuously  Follow-up visit:   You need to see the doctor about one week following surgery for your first post-op visit  At that time your sutures (stitches) will be removed  You will be given a prescription to begin physical therapy if you were not already given one  Common concerns:   Bruising and/or swelling of the shoulder, arm, or hand are common after surgery  To relieve this discomfort it is best to ice the shoulder  Please call if:   1  Any oozing or redness of the wound, fevers (>101 3°F), or chills       2  Any difficulty breathing or heaviness in the chest      REMEMBER - these are only guidelines for what to expect  If you have any questions or concerns, please do not hesitate to call the office  [phone]

## 2020-03-10 NOTE — ANESTHESIA POSTPROCEDURE EVALUATION
Post-Op Assessment Note    CV Status:  Stable  Pain Score: 0    Pain management: adequate     Mental Status:  Sleepy and arousable   Hydration Status:  Euvolemic   PONV Controlled:  Controlled   Airway Patency:  Patent   Post Op Vitals Reviewed: Yes      Staff: Anesthesiologist, CRNA   Comments: Report given to recovering RN, VSS, Pt resting comfortably          /70 (03/10/20 1600)    Temp (!) 97 °F (36 1 °C) (03/10/20 1600)    Pulse 72 (03/10/20 1600)   Resp 16 (03/10/20 1600)    SpO2 98 % (03/10/20 1600)

## 2020-03-11 ENCOUNTER — TELEPHONE (OUTPATIENT)
Dept: OBGYN CLINIC | Facility: CLINIC | Age: 60
End: 2020-03-11

## 2020-03-11 ENCOUNTER — TELEPHONE (OUTPATIENT)
Dept: OBGYN CLINIC | Facility: HOSPITAL | Age: 60
End: 2020-03-11

## 2020-03-11 NOTE — TELEPHONE ENCOUNTER
Spoke to pt  Who stated his block wore off and now he is in 8-9 out of 10 pain   Pt  Last took an oxycodone at 2 pm and took 2 tablets tylenol at 7 am this morning   Pt stated he had spoken to Dr Gerardo Hernandez last night and was told he can take Motrin   Pt was not sure how much he should be taking   I instructed pt  To take 3 tablets motrin 200mg each now at 4:30 pm and take 1 oxycodone at next dose at 6 pm  Pt  Advised not to exceed 2400 mg of ibuprofen in 24 hours and also not to exceed 3000 mg max dose of  acetaminophen in 24 hours  Pt  Made aware he should count the 325 mg acetaminophen in his oxycodone when calculating his max dose   Pt advised to continue icing for 20 minutes on 20 minutes off  I advised pt  I will call him back in 1 hour to check on his pain level  Pt  Verbalized understanding

## 2020-03-11 NOTE — TELEPHONE ENCOUNTER
Dr Coffey   #: [phone]         Patient is requesting a work note stating he is out of work until his next evaluation  He will be calling back with a fax number

## 2020-03-11 NOTE — TELEPHONE ENCOUNTER
Spoke to pt  His pain is decreasing, pain is now 5 out of 10 and he is due to take an oxycodone after hanging up the phone with me   Pt  Stated he has been icing as well and will call me in the morning with an update on his progress

## 2020-03-11 NOTE — TELEPHONE ENCOUNTER
Patient sees Dr Kiara Felix  Patient is calling in stating that he is having increased pain and is asking what further we can do for him   He stated that he had taken Oxycodone about 2 hours ago and it seems like it is not working trans caller to triage nurse to further assist

## 2020-03-18 ENCOUNTER — OFFICE VISIT (OUTPATIENT)
Dept: OBGYN CLINIC | Facility: CLINIC | Age: 60
End: 2020-03-18

## 2020-03-18 ENCOUNTER — EVALUATION (OUTPATIENT)
Dept: PHYSICAL THERAPY | Facility: CLINIC | Age: 60
End: 2020-03-18
Payer: COMMERCIAL

## 2020-03-18 DIAGNOSIS — Z98.890 S/P LEFT ROTATOR CUFF REPAIR: Primary | ICD-10-CM

## 2020-03-18 PROCEDURE — 97161 PT EVAL LOW COMPLEX 20 MIN: CPT

## 2020-03-18 PROCEDURE — 99024 POSTOP FOLLOW-UP VISIT: CPT | Performed by: ORTHOPAEDIC SURGERY

## 2020-03-18 NOTE — PROGRESS NOTES
Assessment/Plan:  1  S/P left rotator cuff repair         Scribe Attestation    I,:   Ted Diallo [place] am acting as a scribe while in the presence of the attending physician :        I,:   Tanya Awad MD personally performed the services described in this documentation    as scribed in my presence :              Viviana Mclain is doing very well  We reviewed his intraoperative pictures together  His sutures were removed today and he tolerated the procedure well  The surgical incisions are healing well and benign in appearance  Viviana Mclain will continue to remain in his sling at all times except when bathing  He remains restricted from actively using the left upper extremity  Due to our current public health crisis there is the threat that face to face physical therapy may not be an option  I would like him to get in with a physical therapist either today or the next so appropriate accommodations can be made  The patient prefers he works with Freddie Walker at his clinic in [facility]  We assisted in making that appointment today  Subjective:   Anat Farris is a 62 y o  male who presents to the office today for a day postop evaluation of the left shoulder arthroscopy rotator cuff repair, subacromial decompression and biceps tenodesis  Can states he is doing fairly well  He does have the persistent mild ache a that is global about the shoulder and will worsen with sudden movements  He states he is eating sleeping well  He has no complaints of excessive pain or distal paresthesias  He denies recent night sweats, fevers or chills        Review of Systems      Past Medical History:   Diagnosis Date    CPAP (continuous positive airway pressure) dependence     Diabetes mellitus ([address] )     Disease of thyroid gland     hypo    Hypercholesterolemia     Migraines     improved since on verapamil    Pre-diabetes     Sleep apnea        Past Surgical History:   Procedure Laterality Date    CHOLECYSTECTOMY      CYST REMOVAL      on chest    EYE SURGERY      FINGER SURGERY Left     index of left hand    ND SHLDR ARTHROSCOP,SURG,W/ROTAT CUFF REPR Left 3/10/2020    Procedure: REPAIR ROTATOR CUFF  ARTHROSCOPIC WITH SUBACROMIAL DECOMPRESSION, BICEPS TENODESIS;  Surgeon: Jimmy Blake MD;  Location: [facility];  Service: Orthopedics       Family History   Problem Relation Age of Onset    Diabetes Mother     Cancer Mother     Diabetes Father     Cancer Father     No Known Problems Sister     No Known Problems Brother     No Known Problems Maternal Aunt     No Known Problems Maternal Uncle     No Known Problems Paternal Aunt     No Known Problems Paternal Uncle     No Known Problems Maternal Grandmother     No Known Problems Maternal Grandfather     No Known Problems Paternal Grandmother     No Known Problems Paternal Grandfather        Social History     Occupational History    Not on file   Tobacco Use    Smoking status: Never Smoker    Smokeless tobacco: Never Used   Substance and Sexual Activity    Alcohol use: Never     Frequency: Never    Drug use: Never    Sexual activity: Not on file         Current Outpatient Medications:     ALPRAZolam (XANAX) 0 5 mg tablet, TAKE 1 TO 2 TABLETS BY MOUTH 1 HOUR PRIOR TO PROCEDURE, Disp: , Rfl:     levothyroxine 175 mcg tablet, Take 175 mcg by mouth daily, Disp: , Rfl:     metFORMIN (GLUCOPHAGE) 1000 MG tablet, Take 500 mg by mouth 2 (two) times a day with meals , Disp: , Rfl:     rosuvastatin (CRESTOR) 10 MG tablet, Take 10 mg by mouth daily, Disp: , Rfl:     verapamil (CALAN-SR) 180 mg CR tablet, , Disp: , Rfl:     No Known Allergies    Objective: There were no vitals filed for this visit  Ortho Exam   In suspension of the left upper extremity found the skin to be of normal color and temperature  The surgical incisions are clean dry and intact and benign in appearance    The left upper extremity is neurovascularly intact with normal sensation to light touch and 2+ radial pulse  He has normal movement in the distal digits  Physical Exam    I have personally reviewed pertinent films in PACS and my interpretation is as follows:   No images reviewed today

## 2020-03-18 NOTE — LETTER
March 18, 2020     Patient: Sonia Farrell   YOB: 1960   Date of Visit: 3/18/2020       To Whom it May Concern:    Sonia Farrell is under my professional care  He was seen in my office on 3/18/2020  He may return to work on 03/24/2020  Please allow him to work from home       If you have any questions or concerns, please don't hesitate to call           Sincerely,          Marimar Little MD        CC: No Recipients

## 2020-03-18 NOTE — PROGRESS NOTES
PT Evaluation     Today's date: 3/18/2020  Patient name: Venancio Blizzard  : 1960  MRN: [id number]  Referring provider: Blu Ornelas MD  Dx:   Encounter Diagnosis     ICD-10-CM    1  S/P left rotator cuff repair Z98 890        Start Time:   Stop Time: 1630  Total time in clinic (min): 45 minutes    Assessment  Assessment details: Venancio Blizzard is a 62 y o  male who presents with pain, decreased strength, decreased ROM, decreased joint mobility and postural  dysfunction  Due to these impairments, patient has difficulty performing ADL's, recreational activities, work-related activities, engaging in social activities, lifting/carrying, reaching  Patient's clinical presentation is consistent with their referring diagnosis of S/P left rotator cuff repair  (primary encounter diagnosis)  Patient has been educated in post-op contraindications / precautions, home exercise program and plan of care  Patient would benefit from skilled physical therapy services to address their aforementioned functional limitations and progress towards prior level of function and independence with home exercise program      Impairments: abnormal muscle firing, abnormal muscle tone, abnormal or restricted ROM, activity intolerance, impaired physical strength, lacks appropriate home exercise program, pain with function, scapular dyskinesis and poor posture   Understanding of Dx/Px/POC: good   Prognosis: good    Goals  Short Term Goals: Target Date 2020  1  Initiate and advance HEP  2  Increase L shoulder PROM flex to 120 deg /ER 45 deg   3  Increase L elbow AROM to full  4  Pt will be able to discontinue use of sling per MD orders  5  Pt will be indep with all dressing/grooming activity    Long Term Goals: Target Date 2020  1  Indep with HEP  2  Increase L shoulder AROM to full  3  Assess L shoulder MMT  4  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  5   Pt will be able to return to driving Plan  Patient would benefit from: skilled PT  Planned modality interventions: cryotherapy, electrical stimulation/Russian stimulation and thermotherapy: hydrocollator packs  Planned therapy interventions: joint mobilization, manual therapy, patient education, postural training, activity modification, abdominal trunk stabilization, body mechanics training, flexibility, functional ROM exercises, graded exercise, home exercise program, neuromuscular re-education, strengthening, stretching, therapeutic activities, therapeutic exercise, motor coordination training, muscle pump exercises, balance/weight bearing training and ADL training  Frequency: 3x week  Duration in weeks: 8  Plan of Care beginning date: 3/18/2020  Plan of Care expiration date: 2020  Treatment plan discussed with: patient        Subjective Evaluation    History of Present Illness  Mechanism of injury: Pt reports on 20 he was standing on a ladder about 4-5ft high when it shifted and he fell backwards with his foot stuck between rungs and landed on his L shoulder  Went to ER for x-rays (negative)  Saw Dr Calixto Kaur  who ordered an MRI (complete RTC and bicep tear), and after a f/u and image review 3/9 he was scheduled for surgery for 3/10/20  Pt has been resting in a sling and went for f/u today 3/18/20 and had sutures removed  Pt was instructed to begin PT to receive an exercise program during COVID outbreak    Quality of life: good    Pain  Current pain ratin  At best pain ratin  At worst pain ratin (After quick movements)  Location: L shoulder  Quality: dull ache and sharp  Relieving factors: rest and ice  Aggravating factors: lifting and overhead activity (Reaching/active motion)  Progression: improved    Social Support  Lives with: spouse    Employment status: working (Working from home for next 5 weeks, mostly sedentary)  Hand dominance: right      Diagnostic Tests  X-ray: normal  MRI studies: abnormal (As above)  Treatments  Current treatment: immobilization and medication  Patient Goals  Patient goals for therapy: decreased pain, increased motion, increased strength, independence with ADLs/IADLs and return to sport/leisure activities  Patient goal: Return to PLOF        Objective     Static Posture     Head  Forward  Shoulders  Rounded  Palpation   Left   Tenderness of the infraspinatus and subscapularis  Tenderness     Left Shoulder   Tenderness in the biceps tendon (proximal) and supraspinatus tendon  Cervical/Thoracic Screen   Cervical range of motion within normal limits  Thoracic range of motion within normal limits with the following exceptions: Moderately decreased thoracic extension    Active Range of Motion     Left Elbow   Flexion: 100 degrees   Extension: -75 degrees with pain    Right Elbow   Normal active range of motion    Passive Range of Motion   Left Shoulder   Flexion: 20 degrees with pain  External rotation 0°: -30 degrees with pain  Internal rotation 0°: 50 degrees     Right Shoulder   Normal passive range of motion    Scapular Mobility   Left Shoulder   Scapular mobility: poor    Joint Play   Left Shoulder  Hypomobile in the anterior capsule, posterior capsule and inferior capsule  Strength/Myotome Testing     Right Elbow   Normal strength    Additional Strength Details  L UE strength not assessed secondary to post-op precautions  Left elbow MMT not formally assessed secondary to post-op precautions      Flowsheet Rows      Most Recent Value   PT/OT G-Codes   Current Score  25   Projected Score  69   FOTO information reviewed  Yes   Assessment Type  Evaluation             Precautions: Standard, per Dr Ana Costa protocol    Specialty Daily Treatment Diary     Pt provided with HEP for next 5-6 weeks  Instructed in all activity including initiation date    Currently performing ball squeezes and scap retractions only until 3 weeks post-op  Manual 3/18/20       PROM        STM Joint Mobs                        Exercise Diary         Gripper 10x5"       Elbow AROM  HEPHEP       C/S AROM        UT stretch                Scap Retraction 10x5"       PROM flexion HEP slides, self PROM, cane AAROM       Cane ER HEP       5-way Isometrics HEP                                                                       Modalities        CP

## 2020-03-24 ENCOUNTER — APPOINTMENT (OUTPATIENT)
Dept: PHYSICAL THERAPY | Facility: CLINIC | Age: 60
End: 2020-03-24
Payer: COMMERCIAL

## 2020-03-26 ENCOUNTER — APPOINTMENT (OUTPATIENT)
Dept: PHYSICAL THERAPY | Facility: CLINIC | Age: 60
End: 2020-03-26
Payer: COMMERCIAL

## 2020-03-30 ENCOUNTER — TELEPHONE (OUTPATIENT)
Dept: OBGYN CLINIC | Facility: CLINIC | Age: 60
End: 2020-03-30

## 2020-03-31 ENCOUNTER — OFFICE VISIT (OUTPATIENT)
Dept: PHYSICAL THERAPY | Facility: CLINIC | Age: 60
End: 2020-03-31
Payer: COMMERCIAL

## 2020-03-31 DIAGNOSIS — Z98.890 S/P LEFT ROTATOR CUFF REPAIR: Primary | ICD-10-CM

## 2020-03-31 PROCEDURE — 97112 NEUROMUSCULAR REEDUCATION: CPT

## 2020-03-31 PROCEDURE — 97110 THERAPEUTIC EXERCISES: CPT

## 2020-03-31 PROCEDURE — 97140 MANUAL THERAPY 1/> REGIONS: CPT

## 2020-03-31 NOTE — PROGRESS NOTES
Daily Note      Today's date: 3/31/2020  Patient name: Prashant Mora  : 1960  MRN: [id number]  Referring provider: Frederick Larios MD  Dx:   Encounter Diagnosis     ICD-10-CM    1  S/P left rotator cuff repair Z98 [id number]        Subjective: Pt reports that he currently has 2/10 pain in the left shoulder  Pt states he has been consistent with home exercises including ball squeeze and scap retractions  Pt states that he has increased tightness in the biceps and left forearm  Pt states he accidentally initiated lifting an object once and used his body primarily to lift it, but had increased pain over one day and then it subsided  Objective: See treatment diary below    Assessment: Pt tolerated treatment well  Pt demonstrates good carryover of initial HEP and good familiarity with new exercises added today since these were taught to him at initial evaluation  Pt demonstrated slight improvement in self ROM and PROM with increased repetitions, achieve neutral rotation with ER exercise and shoulder AA/PROM to ~70 degrees at maximum  Plan: Continue per plan of care  Progress treatment per protocol           Precautions: Standard, per Dr Flaco Prieto protocol    Specialty Daily Treatment Diary     Manual 3/18/20 3/31/20      PROM  L shoulder flex to 90, ER to neutral, L elbow flex/ext      STM  L biceps, forearm      Joint Mobs                        Exercise Diary         Pendulums  30/30  CW, CCW      Gripper 10x5" 30x5s green      Elbow AAROM  HEPHEP Self AAROM seated 20x5s      C/S AROM        UT stretch                Scap Retraction 10x5" 20x5s      PROM flexion HEP slides, self PROM, cane AAROM Standing w/ support of RUE  20x5s      Cane ER HEP 20x5s      5-way Isometrics HEP -->                                                                      Modalities        CP  10 min supine

## 2020-04-02 ENCOUNTER — OFFICE VISIT (OUTPATIENT)
Dept: PHYSICAL THERAPY | Facility: CLINIC | Age: 60
End: 2020-04-02
Payer: COMMERCIAL

## 2020-04-02 DIAGNOSIS — Z98.890 S/P LEFT ROTATOR CUFF REPAIR: Primary | ICD-10-CM

## 2020-04-02 PROCEDURE — 97112 NEUROMUSCULAR REEDUCATION: CPT

## 2020-04-02 PROCEDURE — 97110 THERAPEUTIC EXERCISES: CPT

## 2020-04-02 PROCEDURE — 97140 MANUAL THERAPY 1/> REGIONS: CPT

## 2020-04-07 ENCOUNTER — OFFICE VISIT (OUTPATIENT)
Dept: PHYSICAL THERAPY | Facility: CLINIC | Age: 60
End: 2020-04-07
Payer: COMMERCIAL

## 2020-04-07 DIAGNOSIS — Z98.890 S/P LEFT ROTATOR CUFF REPAIR: Primary | ICD-10-CM

## 2020-04-07 PROCEDURE — 97112 NEUROMUSCULAR REEDUCATION: CPT

## 2020-04-07 PROCEDURE — 97140 MANUAL THERAPY 1/> REGIONS: CPT

## 2020-04-07 PROCEDURE — 97110 THERAPEUTIC EXERCISES: CPT

## 2020-04-09 ENCOUNTER — OFFICE VISIT (OUTPATIENT)
Dept: PHYSICAL THERAPY | Facility: CLINIC | Age: 60
End: 2020-04-09
Payer: COMMERCIAL

## 2020-04-09 DIAGNOSIS — Z98.890 S/P LEFT ROTATOR CUFF REPAIR: Primary | ICD-10-CM

## 2020-04-09 PROCEDURE — 97112 NEUROMUSCULAR REEDUCATION: CPT

## 2020-04-09 PROCEDURE — 97140 MANUAL THERAPY 1/> REGIONS: CPT

## 2020-04-09 PROCEDURE — 97110 THERAPEUTIC EXERCISES: CPT

## 2020-04-14 ENCOUNTER — OFFICE VISIT (OUTPATIENT)
Dept: PHYSICAL THERAPY | Facility: CLINIC | Age: 60
End: 2020-04-14
Payer: COMMERCIAL

## 2020-04-14 DIAGNOSIS — Z98.890 S/P LEFT ROTATOR CUFF REPAIR: Primary | ICD-10-CM

## 2020-04-14 PROCEDURE — 97110 THERAPEUTIC EXERCISES: CPT

## 2020-04-14 PROCEDURE — 97140 MANUAL THERAPY 1/> REGIONS: CPT

## 2020-04-14 PROCEDURE — 97112 NEUROMUSCULAR REEDUCATION: CPT

## 2020-04-16 ENCOUNTER — OFFICE VISIT (OUTPATIENT)
Dept: PHYSICAL THERAPY | Facility: CLINIC | Age: 60
End: 2020-04-16
Payer: COMMERCIAL

## 2020-04-16 DIAGNOSIS — Z98.890 S/P LEFT ROTATOR CUFF REPAIR: Primary | ICD-10-CM

## 2020-04-16 PROCEDURE — 97110 THERAPEUTIC EXERCISES: CPT

## 2020-04-16 PROCEDURE — 97112 NEUROMUSCULAR REEDUCATION: CPT

## 2020-04-16 PROCEDURE — 97140 MANUAL THERAPY 1/> REGIONS: CPT

## 2020-04-21 ENCOUNTER — OFFICE VISIT (OUTPATIENT)
Dept: PHYSICAL THERAPY | Facility: CLINIC | Age: 60
End: 2020-04-21
Payer: COMMERCIAL

## 2020-04-21 DIAGNOSIS — Z98.890 S/P LEFT ROTATOR CUFF REPAIR: Primary | ICD-10-CM

## 2020-04-21 PROCEDURE — 97140 MANUAL THERAPY 1/> REGIONS: CPT

## 2020-04-21 PROCEDURE — 97112 NEUROMUSCULAR REEDUCATION: CPT

## 2020-04-21 PROCEDURE — 97110 THERAPEUTIC EXERCISES: CPT

## 2020-04-23 ENCOUNTER — OFFICE VISIT (OUTPATIENT)
Dept: PHYSICAL THERAPY | Facility: CLINIC | Age: 60
End: 2020-04-23
Payer: COMMERCIAL

## 2020-04-23 DIAGNOSIS — Z98.890 S/P LEFT ROTATOR CUFF REPAIR: Primary | ICD-10-CM

## 2020-04-23 PROCEDURE — 97140 MANUAL THERAPY 1/> REGIONS: CPT

## 2020-04-23 PROCEDURE — 97112 NEUROMUSCULAR REEDUCATION: CPT

## 2020-04-23 PROCEDURE — 97110 THERAPEUTIC EXERCISES: CPT

## 2020-04-28 ENCOUNTER — OFFICE VISIT (OUTPATIENT)
Dept: PHYSICAL THERAPY | Facility: CLINIC | Age: 60
End: 2020-04-28
Payer: COMMERCIAL

## 2020-04-28 DIAGNOSIS — Z98.890 S/P LEFT ROTATOR CUFF REPAIR: Primary | ICD-10-CM

## 2020-04-28 PROCEDURE — 97110 THERAPEUTIC EXERCISES: CPT

## 2020-04-28 PROCEDURE — 97112 NEUROMUSCULAR REEDUCATION: CPT

## 2020-04-28 PROCEDURE — 97140 MANUAL THERAPY 1/> REGIONS: CPT

## 2020-04-29 ENCOUNTER — OFFICE VISIT (OUTPATIENT)
Dept: OBGYN CLINIC | Facility: CLINIC | Age: 60
End: 2020-04-29

## 2020-04-29 DIAGNOSIS — Z98.890 S/P LEFT ROTATOR CUFF REPAIR: Primary | ICD-10-CM

## 2020-04-29 PROCEDURE — 99024 POSTOP FOLLOW-UP VISIT: CPT | Performed by: ORTHOPAEDIC SURGERY

## 2020-04-30 ENCOUNTER — OFFICE VISIT (OUTPATIENT)
Dept: PHYSICAL THERAPY | Facility: CLINIC | Age: 60
End: 2020-04-30
Payer: COMMERCIAL

## 2020-04-30 DIAGNOSIS — Z98.890 S/P LEFT ROTATOR CUFF REPAIR: Primary | ICD-10-CM

## 2020-04-30 PROCEDURE — 97110 THERAPEUTIC EXERCISES: CPT

## 2020-04-30 PROCEDURE — 97112 NEUROMUSCULAR REEDUCATION: CPT

## 2020-04-30 PROCEDURE — 97140 MANUAL THERAPY 1/> REGIONS: CPT

## 2020-05-05 ENCOUNTER — OFFICE VISIT (OUTPATIENT)
Dept: PHYSICAL THERAPY | Facility: CLINIC | Age: 60
End: 2020-05-05
Payer: COMMERCIAL

## 2020-05-05 DIAGNOSIS — Z98.890 S/P LEFT ROTATOR CUFF REPAIR: Primary | ICD-10-CM

## 2020-05-05 PROCEDURE — 97110 THERAPEUTIC EXERCISES: CPT

## 2020-05-05 PROCEDURE — 97112 NEUROMUSCULAR REEDUCATION: CPT

## 2020-05-05 PROCEDURE — 97140 MANUAL THERAPY 1/> REGIONS: CPT

## 2020-05-07 ENCOUNTER — OFFICE VISIT (OUTPATIENT)
Dept: PHYSICAL THERAPY | Facility: CLINIC | Age: 60
End: 2020-05-07
Payer: COMMERCIAL

## 2020-05-07 DIAGNOSIS — Z98.890 S/P LEFT ROTATOR CUFF REPAIR: Primary | ICD-10-CM

## 2020-05-07 PROCEDURE — 97140 MANUAL THERAPY 1/> REGIONS: CPT

## 2020-05-07 PROCEDURE — 97112 NEUROMUSCULAR REEDUCATION: CPT

## 2020-05-07 PROCEDURE — 97110 THERAPEUTIC EXERCISES: CPT

## 2020-05-12 ENCOUNTER — OFFICE VISIT (OUTPATIENT)
Dept: PHYSICAL THERAPY | Facility: CLINIC | Age: 60
End: 2020-05-12
Payer: COMMERCIAL

## 2020-05-12 DIAGNOSIS — Z98.890 S/P LEFT ROTATOR CUFF REPAIR: Primary | ICD-10-CM

## 2020-05-12 PROCEDURE — 97140 MANUAL THERAPY 1/> REGIONS: CPT

## 2020-05-12 PROCEDURE — 97110 THERAPEUTIC EXERCISES: CPT

## 2020-05-12 PROCEDURE — 97112 NEUROMUSCULAR REEDUCATION: CPT

## 2020-05-14 ENCOUNTER — OFFICE VISIT (OUTPATIENT)
Dept: PHYSICAL THERAPY | Facility: CLINIC | Age: 60
End: 2020-05-14
Payer: COMMERCIAL

## 2020-05-14 DIAGNOSIS — Z98.890 S/P LEFT ROTATOR CUFF REPAIR: Primary | ICD-10-CM

## 2020-05-14 PROCEDURE — 97112 NEUROMUSCULAR REEDUCATION: CPT

## 2020-05-14 PROCEDURE — 97140 MANUAL THERAPY 1/> REGIONS: CPT

## 2020-05-14 PROCEDURE — 97110 THERAPEUTIC EXERCISES: CPT

## 2020-05-19 ENCOUNTER — OFFICE VISIT (OUTPATIENT)
Dept: PHYSICAL THERAPY | Facility: CLINIC | Age: 60
End: 2020-05-19
Payer: COMMERCIAL

## 2020-05-19 DIAGNOSIS — Z98.890 S/P LEFT ROTATOR CUFF REPAIR: Primary | ICD-10-CM

## 2020-05-19 PROCEDURE — 97140 MANUAL THERAPY 1/> REGIONS: CPT

## 2020-05-19 PROCEDURE — 97110 THERAPEUTIC EXERCISES: CPT

## 2020-05-19 PROCEDURE — 97112 NEUROMUSCULAR REEDUCATION: CPT

## 2020-05-21 ENCOUNTER — OFFICE VISIT (OUTPATIENT)
Dept: PHYSICAL THERAPY | Facility: CLINIC | Age: 60
End: 2020-05-21
Payer: COMMERCIAL

## 2020-05-21 DIAGNOSIS — Z98.890 S/P LEFT ROTATOR CUFF REPAIR: Primary | ICD-10-CM

## 2020-05-21 PROCEDURE — 97140 MANUAL THERAPY 1/> REGIONS: CPT

## 2020-05-21 PROCEDURE — 97110 THERAPEUTIC EXERCISES: CPT

## 2020-05-26 ENCOUNTER — OFFICE VISIT (OUTPATIENT)
Dept: OBGYN CLINIC | Facility: CLINIC | Age: 60
End: 2020-05-26

## 2020-05-26 ENCOUNTER — OFFICE VISIT (OUTPATIENT)
Dept: PHYSICAL THERAPY | Facility: CLINIC | Age: 60
End: 2020-05-26
Payer: COMMERCIAL

## 2020-05-26 VITALS
DIASTOLIC BLOOD PRESSURE: 84 MMHG | WEIGHT: 210 LBS | HEART RATE: 73 BPM | BODY MASS INDEX: 28.48 KG/M2 | SYSTOLIC BLOOD PRESSURE: 149 MMHG | TEMPERATURE: 97.7 F

## 2020-05-26 DIAGNOSIS — Z98.890 S/P LEFT ROTATOR CUFF REPAIR: Primary | ICD-10-CM

## 2020-05-26 PROCEDURE — 97110 THERAPEUTIC EXERCISES: CPT

## 2020-05-26 PROCEDURE — 99024 POSTOP FOLLOW-UP VISIT: CPT | Performed by: ORTHOPAEDIC SURGERY

## 2020-05-26 PROCEDURE — 97140 MANUAL THERAPY 1/> REGIONS: CPT

## 2020-05-26 PROCEDURE — 97112 NEUROMUSCULAR REEDUCATION: CPT

## 2020-05-26 RX ORDER — LEVOTHYROXINE SODIUM 0.15 MG/1
150 TABLET ORAL DAILY
COMMUNITY
Start: 2020-03-17 | End: 2020-06-23 | Stop reason: ALTCHOICE

## 2020-05-28 ENCOUNTER — APPOINTMENT (OUTPATIENT)
Dept: PHYSICAL THERAPY | Facility: CLINIC | Age: 60
End: 2020-05-28
Payer: COMMERCIAL

## 2020-06-02 ENCOUNTER — OFFICE VISIT (OUTPATIENT)
Dept: PHYSICAL THERAPY | Facility: CLINIC | Age: 60
End: 2020-06-02
Payer: COMMERCIAL

## 2020-06-02 DIAGNOSIS — Z98.890 S/P LEFT ROTATOR CUFF REPAIR: Primary | ICD-10-CM

## 2020-06-02 PROCEDURE — 97112 NEUROMUSCULAR REEDUCATION: CPT

## 2020-06-02 PROCEDURE — 97140 MANUAL THERAPY 1/> REGIONS: CPT

## 2020-06-02 PROCEDURE — 97110 THERAPEUTIC EXERCISES: CPT

## 2020-06-04 ENCOUNTER — OFFICE VISIT (OUTPATIENT)
Dept: PHYSICAL THERAPY | Facility: CLINIC | Age: 60
End: 2020-06-04
Payer: COMMERCIAL

## 2020-06-04 DIAGNOSIS — Z98.890 S/P LEFT ROTATOR CUFF REPAIR: Primary | ICD-10-CM

## 2020-06-04 PROCEDURE — 97112 NEUROMUSCULAR REEDUCATION: CPT

## 2020-06-04 PROCEDURE — 97110 THERAPEUTIC EXERCISES: CPT

## 2020-06-04 PROCEDURE — 97140 MANUAL THERAPY 1/> REGIONS: CPT

## 2020-06-09 ENCOUNTER — OFFICE VISIT (OUTPATIENT)
Dept: PHYSICAL THERAPY | Facility: CLINIC | Age: 60
End: 2020-06-09
Payer: COMMERCIAL

## 2020-06-09 DIAGNOSIS — Z98.890 S/P LEFT ROTATOR CUFF REPAIR: Primary | ICD-10-CM

## 2020-06-09 PROCEDURE — 97112 NEUROMUSCULAR REEDUCATION: CPT

## 2020-06-09 PROCEDURE — 97140 MANUAL THERAPY 1/> REGIONS: CPT

## 2020-06-09 PROCEDURE — 97110 THERAPEUTIC EXERCISES: CPT

## 2020-06-11 ENCOUNTER — OFFICE VISIT (OUTPATIENT)
Dept: PHYSICAL THERAPY | Facility: CLINIC | Age: 60
End: 2020-06-11
Payer: COMMERCIAL

## 2020-06-11 DIAGNOSIS — Z98.890 S/P LEFT ROTATOR CUFF REPAIR: Primary | ICD-10-CM

## 2020-06-11 PROCEDURE — 97110 THERAPEUTIC EXERCISES: CPT

## 2020-06-11 PROCEDURE — 97140 MANUAL THERAPY 1/> REGIONS: CPT

## 2020-06-11 PROCEDURE — 97112 NEUROMUSCULAR REEDUCATION: CPT

## 2020-06-16 ENCOUNTER — OFFICE VISIT (OUTPATIENT)
Dept: PHYSICAL THERAPY | Facility: CLINIC | Age: 60
End: 2020-06-16
Payer: COMMERCIAL

## 2020-06-16 DIAGNOSIS — Z98.890 S/P LEFT ROTATOR CUFF REPAIR: Primary | ICD-10-CM

## 2020-06-16 PROCEDURE — 97112 NEUROMUSCULAR REEDUCATION: CPT

## 2020-06-16 PROCEDURE — 97140 MANUAL THERAPY 1/> REGIONS: CPT

## 2020-06-16 PROCEDURE — 97110 THERAPEUTIC EXERCISES: CPT

## 2020-06-18 ENCOUNTER — OFFICE VISIT (OUTPATIENT)
Dept: PHYSICAL THERAPY | Facility: CLINIC | Age: 60
End: 2020-06-18
Payer: COMMERCIAL

## 2020-06-18 DIAGNOSIS — Z98.890 S/P LEFT ROTATOR CUFF REPAIR: Primary | ICD-10-CM

## 2020-06-18 PROCEDURE — 97112 NEUROMUSCULAR REEDUCATION: CPT

## 2020-06-18 PROCEDURE — 97110 THERAPEUTIC EXERCISES: CPT

## 2020-06-18 PROCEDURE — 97140 MANUAL THERAPY 1/> REGIONS: CPT

## 2020-06-23 ENCOUNTER — OFFICE VISIT (OUTPATIENT)
Dept: OBGYN CLINIC | Facility: CLINIC | Age: 60
End: 2020-06-23
Payer: COMMERCIAL

## 2020-06-23 ENCOUNTER — OFFICE VISIT (OUTPATIENT)
Dept: PHYSICAL THERAPY | Facility: CLINIC | Age: 60
End: 2020-06-23
Payer: COMMERCIAL

## 2020-06-23 VITALS
WEIGHT: 204 LBS | HEART RATE: 72 BPM | SYSTOLIC BLOOD PRESSURE: 139 MMHG | TEMPERATURE: 96.5 F | HEIGHT: 72 IN | BODY MASS INDEX: 27.63 KG/M2 | DIASTOLIC BLOOD PRESSURE: 77 MMHG

## 2020-06-23 DIAGNOSIS — Z98.890 S/P LEFT ROTATOR CUFF REPAIR: Primary | ICD-10-CM

## 2020-06-23 PROCEDURE — 97112 NEUROMUSCULAR REEDUCATION: CPT

## 2020-06-23 PROCEDURE — 97140 MANUAL THERAPY 1/> REGIONS: CPT

## 2020-06-23 PROCEDURE — 97110 THERAPEUTIC EXERCISES: CPT

## 2020-06-23 PROCEDURE — 99213 OFFICE O/P EST LOW 20 MIN: CPT | Performed by: ORTHOPAEDIC SURGERY

## 2020-06-25 ENCOUNTER — OFFICE VISIT (OUTPATIENT)
Dept: PHYSICAL THERAPY | Facility: CLINIC | Age: 60
End: 2020-06-25
Payer: COMMERCIAL

## 2020-06-25 DIAGNOSIS — Z98.890 S/P LEFT ROTATOR CUFF REPAIR: Primary | ICD-10-CM

## 2020-06-25 PROCEDURE — 97110 THERAPEUTIC EXERCISES: CPT

## 2020-06-25 PROCEDURE — 97140 MANUAL THERAPY 1/> REGIONS: CPT

## 2020-06-25 PROCEDURE — 97112 NEUROMUSCULAR REEDUCATION: CPT

## 2020-06-30 ENCOUNTER — OFFICE VISIT (OUTPATIENT)
Dept: PHYSICAL THERAPY | Facility: CLINIC | Age: 60
End: 2020-06-30
Payer: COMMERCIAL

## 2020-06-30 DIAGNOSIS — Z98.890 S/P LEFT ROTATOR CUFF REPAIR: Primary | ICD-10-CM

## 2020-06-30 PROCEDURE — 97110 THERAPEUTIC EXERCISES: CPT

## 2020-06-30 PROCEDURE — 97140 MANUAL THERAPY 1/> REGIONS: CPT

## 2020-06-30 PROCEDURE — 97112 NEUROMUSCULAR REEDUCATION: CPT

## 2020-07-02 ENCOUNTER — OFFICE VISIT (OUTPATIENT)
Dept: PHYSICAL THERAPY | Facility: CLINIC | Age: 60
End: 2020-07-02
Payer: COMMERCIAL

## 2020-07-02 DIAGNOSIS — Z98.890 S/P LEFT ROTATOR CUFF REPAIR: Primary | ICD-10-CM

## 2020-07-02 PROCEDURE — 97140 MANUAL THERAPY 1/> REGIONS: CPT

## 2020-07-02 PROCEDURE — 97112 NEUROMUSCULAR REEDUCATION: CPT

## 2020-07-02 PROCEDURE — 97110 THERAPEUTIC EXERCISES: CPT

## 2020-07-02 NOTE — PROGRESS NOTES
Daily Note     Today's date: 2020  Patient name: Damion Azar  : 1960  MRN: [id number]  Referring provider: Ricki Mcleod MD  Dx:   Encounter Diagnosis   Name Primary?  S/P left rotator cuff repair Yes                  Subjective: Pt reports consistent 3/10 pain in shoulder and down bicep  States he feels better with movement and performing exercises  Stiffness increases the more he rests         Objective: See treatment diary below        Precautions: Standard, per Dr Abigail Saavedra protocol    Specialty Daily Treatment Diary     Manual    PROM L shoulder all directions  L shoulder all directions L shoulder all directions L shoulder all directions   STM Left teres group and subscap  Left teres group and subscap Left teres group and subscap Left teres group and subscap   Joint Mobs Scap, GH post and if gr III-IV  Scap, GH Scap, GH Scap, GH posterior   Scap PNF Upward rotation and post tilt assist with active abduction and flexion x20 ea  Upward rotation and post tilt assist with active abduction and flexion x20 ea Upward rotation and post tilt assist with active abduction and flexion x20 ea            Exercise Diary         Scap Retraction Anisha 7 0 Hold 5, 2x10   Anisha 5 0 Hold 5, 2x10  Anisha 7 0 Hold 5, 2x10  [place] 7 0 Hold 5, 2x10    PROM flexion  Supine 20x5" 2 lbs with cane,  Pulleys 20x  Supine 20x5" 2 lbs with cane,  Pulleys 20x Supine 20x5" 2 lbs with cane,  Pulleys 20x Supine 20x5" 2 lbs with cane,  Pulleys 20x   Cane ER Hold 5, 2x10  Hold 5, 2x10 and away from wall hold 10, 1x2 Hold 5, 2x10 Hold 5, 2x10   Shoulder ext Anisha 3 0 Hold 5, 2x10   Anisha 3 0 Hold 5, 2x10  Add [place] 3 0 Hold 5, 2x10  Add Anisha 3 0 Hold 5, 2x10    SL ER   2 lb 2x10 2 lb 2x10    TS extension     Seated 2x10   Doorway pec stretch Hold 10, 1x10        Band IR & ER yellow 2x10 ea  yellow 2x10 ea yellow 2x10 ea yellow 2x10 ea   Wall slides Hold 5, 2x10   Hold 5, 2x10 Hold 5, 2x10 Hold 5, 2x10   scaption   Supine 2 lb 2x10     Farmer's carry    15 lb 20 ft 2x6    Post capsule stretch Hold 10, 1x5   Hold 10, 1x10  Hold 10, 1x5   Modalities        CP 10 min  10 min 10 min 10 min                    Assessment: Pt tolerated treatment well with minimal complaints of pain  Pt responds well to [facility] joint mobilization to increase flexion and abduction  Significant tightness present at anterior shoulder and pec  Added doorway stretch to HEP  Plan: Continue with plan of care to decrease pain, improve mobility, strength, and function

## 2020-07-07 ENCOUNTER — APPOINTMENT (OUTPATIENT)
Dept: PHYSICAL THERAPY | Facility: CLINIC | Age: 60
End: 2020-07-07
Payer: COMMERCIAL

## 2020-07-09 ENCOUNTER — APPOINTMENT (OUTPATIENT)
Dept: PHYSICAL THERAPY | Facility: CLINIC | Age: 60
End: 2020-07-09
Payer: COMMERCIAL

## 2020-07-14 ENCOUNTER — OFFICE VISIT (OUTPATIENT)
Dept: PHYSICAL THERAPY | Facility: CLINIC | Age: 60
End: 2020-07-14
Payer: COMMERCIAL

## 2020-07-14 DIAGNOSIS — Z98.890 S/P LEFT ROTATOR CUFF REPAIR: Primary | ICD-10-CM

## 2020-07-14 PROCEDURE — 97110 THERAPEUTIC EXERCISES: CPT

## 2020-07-14 PROCEDURE — 97112 NEUROMUSCULAR REEDUCATION: CPT

## 2020-07-14 PROCEDURE — 97140 MANUAL THERAPY 1/> REGIONS: CPT

## 2020-07-14 NOTE — PROGRESS NOTES
Daily Note     Today's date: 2020  Patient name: Efren Quintana  : 1960  MRN: [id number]  Referring provider: Lisa Gallardo MD  Dx:   Encounter Diagnosis   Name Primary?  S/P left rotator cuff repair Yes                  Subjective: Pt reports he has been feeling good these past 2 weeks  He was compliant with performing exercises while away  Pain is currently rated 0/10        Objective: See treatment diary below        Precautions: Standard, per Dr Austin Lee protocol    Specialty Daily Treatment Diary     Manual    PROM L shoulder all directions L shoulder all directions  L shoulder all directions L shoulder all directions   STM Left teres group and subscap   Left teres group and subscap Left teres group and subscap   Joint Mobs Scap, GH post and if gr III-IV Scap, GH post and if gr III-IV  Scap, GH Scap, GH posterior   Scap PNF Upward rotation and post tilt assist with active abduction and flexion x20 ea   Upward rotation and post tilt assist with active abduction and flexion x20 ea            Exercise Diary         Scap Retraction Anisha 7 0 Hold 5, 2x10  Anisha 7 0 Hold 5, 2x10   [place] 7 0 Hold 5, 2x10  [place] 7 0 Hold 5, 2x10    PROM flexion  Supine 20x5" 2 lbs with cane,  Pulleys 20x Supine 10x10" 2 lbs with cane,  Pulleys 20x  Supine 20x5" 2 lbs with cane,  Pulleys 20x Supine 20x5" 2 lbs with cane,  Pulleys 20x   Cane ER Hold 5, 2x10 Hold 5, 2x10  Hold 5, 2x10 Hold 5, 2x10   Shoulder ext Anisha 3 0 Hold 5, 2x10  Add [place] 3 0 Hold 5, 2x10  Add Anisha 3 0 Hold 5, 2x10  Add Anisha 3 0 Hold 5, 2x10    SL ER    2 lb 2x10    TS extension     Seated 2x10   Doorway pec stretch Hold 10, 1x10  Hold 10, 1x10       Band IR & ER yellow 2x10 ea yellow 2x10 ea  yellow 2x10 ea yellow 2x10 ea   Wall slides Hold 5, 2x10  Hold 5, 2x10   Hold 5, 2x10 Hold 5, 2x10   scaption        Duran's carry    15 lb 20 ft 2x6    Post capsule stretch Hold 10, 1x5 Hold 10, 1x10  Hold 10, 1x10 Hold 10, 1x5   Modalities        CP 10 min 10 min  10 min 10 min                     Assessment: Pt tolerated treatment well with minimal complaints of pain  Pt demonstrates improved scapulothoracic rhythm with overhead movement  Continued tightness present in pec  Plan: Continue with plan of care to decrease pain, improve mobility, strength, and function

## 2020-07-16 ENCOUNTER — OFFICE VISIT (OUTPATIENT)
Dept: PHYSICAL THERAPY | Facility: CLINIC | Age: 60
End: 2020-07-16
Payer: COMMERCIAL

## 2020-07-16 DIAGNOSIS — Z98.890 S/P LEFT ROTATOR CUFF REPAIR: Primary | ICD-10-CM

## 2020-07-16 PROCEDURE — 97110 THERAPEUTIC EXERCISES: CPT

## 2020-07-16 PROCEDURE — 97112 NEUROMUSCULAR REEDUCATION: CPT

## 2020-07-16 PROCEDURE — 97140 MANUAL THERAPY 1/> REGIONS: CPT

## 2020-07-16 NOTE — PROGRESS NOTES
Daily Note     Today's date: 2020  Patient name: Delon Valencia  : 1960  MRN: [id number]  Referring provider: Mallory Hussein MD  Dx:   Encounter Diagnosis   Name Primary?  S/P left rotator cuff repair Yes       Start Time:   Stop Time:   Total time in clinic (min): 55 minutes    Subjective: Pt reports he weed wacked yesterday and trimmed his hedges, reports increase soreness today, however reports he was happy he was able to perform it, reports soreness feels more muscular         Objective: See treatment diary below        Precautions: Standard, per Dr Silverio Nail protocol    Specialty Daily Treatment Diary     Manual    PROM L shoulder all directions L shoulder all directions L shoulder all directions  L shoulder all directions   STM Left teres group and subscap  Subscap, pec minor  Left teres group and subscap   Joint Mobs Scap, GH post and if gr III-IV Scap, GH post and if gr III-IV Scap, GH post and if gr III-IV  Scap, GH posterior   Scap PNF Upward rotation and post tilt assist with active abduction and flexion x20 ea  Contract relax for ER 6x6"             Exercise Diary         Scap Retraction Anisha 7 0 Hold 5, 2x10  [place] 7 0 Hold 5, 2x10    [place] 7 0 Hold 5, 2x10    PROM flexion  Supine 20x5" 2 lbs with cane,  Pulleys 20x Supine 10x10" 2 lbs with cane,  Pulleys 20x Pulley 20x  Supine 20x5" 2 lbs with cane,  Pulleys 20x   Cane ER Hold 5, 2x10 Hold 5, 2x10   Hold 5, 2x10   Shoulder ext [place] 3 0 Hold 5, 2x10  Add [place] 3 0 Hold 5, 2x10   Add Anisha 3 0 Hold 5, 2x10    SL ER   Prone I/T 20x3"     TS extension   Prone sh flx 20x3"  Seated 2x10   Doorway pec stretch Hold 10, 1x10  Hold 10, 1x10       Band IR & ER yellow 2x10 ea yellow 2x10 ea Blue IR/ 2x10 ea  yellow 2x10 ea   Wall slides Hold 5, 2x10  Hold 5, 2x10    Hold 5, 2x10   scaption        Duran's carry        Post capsule stretch Hold 10, 1x5 Hold 10, 1x10   Hold 10, 1x5   Modalities        CP 10 min 10 min 10 min  10 min                     Assessment: Pt demonstrates stiffness throughout ROM, improved ER after contract/relax  Pt will see MD Tuesday  Plan: Continue with plan of care to decrease pain, improve mobility, strength, and function

## 2020-07-17 ENCOUNTER — OFFICE VISIT (OUTPATIENT)
Dept: PHYSICAL THERAPY | Facility: CLINIC | Age: 60
End: 2020-07-17
Payer: COMMERCIAL

## 2020-07-17 DIAGNOSIS — Z98.890 S/P LEFT ROTATOR CUFF REPAIR: Primary | ICD-10-CM

## 2020-07-17 PROCEDURE — 97112 NEUROMUSCULAR REEDUCATION: CPT

## 2020-07-17 PROCEDURE — 97140 MANUAL THERAPY 1/> REGIONS: CPT

## 2020-07-17 PROCEDURE — 97110 THERAPEUTIC EXERCISES: CPT

## 2020-07-17 NOTE — PROGRESS NOTES
PT Re-Evaluation     Today's date: 2020  Patient name: Terra Easley  : 1960  MRN: [id number]  Referring provider: Ami Cuevas MD  Dx:   Encounter Diagnosis     ICD-10-CM    1  S/P left rotator cuff repair Z98 890                   Assessment  Assessment details: Terra Easley is a 62 y o  male who has been seen in physical therapy for 31 visits secondary to the diagnosis of S/P left rotator cuff repair  (primary encounter diagnosis) and is making slow but steady gains towards established goals  The patient has demonstrated decreased pain level, improved strength upper extremity, improved postural awareness, improved spinal ROM, minimal improvements in shoulder ROM, improved flexibility and improved scapular stabilization  As a result, he 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  Weakness persists with overhead activities  He would benefit from continued PT services to address remaining impairments outlined in Progress Note and to maximize function  Goals updated  Impairments: abnormal muscle firing, abnormal muscle tone, abnormal or restricted ROM, activity intolerance, impaired physical strength, lacks appropriate home exercise program, pain with function, scapular dyskinesis, weight-bearing intolerance and poor posture     Symptom irritability: lowUnderstanding of Dx/Px/POC: good   Prognosis: good    Goals  Short Term Goals: Target Date   1  Initiate and advance HEP -MET  2  Increase L shoulder PROM flex to 120 deg /ER 45 deg -MET  3  Increase L elbow AROM to full-MET  4  Pt will be able to discontinue use of sling per MD orders-MET  5  Pt will be indep with all dressing/grooming activity-MET    Long Term Goals: Target Date 2020  1  Indep with HEP -NOT MET  2  Increase L shoulder AROM to full-NOT MET  3  Pt will be be able perform household chores and landscaping with no restrictions PARTIALLY MET  4   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  5  Pt will be able to return to driving -MET  6  Pt will be able to place 5 lb object overhead- MET      Plan  Patient would benefit from: skilled PT  Planned modality interventions: cryotherapy, electrical stimulation/Russian stimulation and thermotherapy: hydrocollator packs  Planned therapy interventions: joint mobilization, manual therapy, patient education, postural training, activity modification, abdominal trunk stabilization, body mechanics training, flexibility, functional ROM exercises, graded exercise, home exercise program, neuromuscular re-education, strengthening, stretching, therapeutic activities, therapeutic exercise, motor coordination training, muscle pump exercises, balance/weight bearing training and ADL training  Frequency: 2x week  Duration in weeks: 6  Treatment plan discussed with: patient        Subjective Evaluation    History of Present Illness  Mechanism of injury: Pt reports that he feels like he is at 80% of function  Pt states that feels his strength is improving but continues to feel weakness overhead  Denies any sleep disturbances or night time pain anymore  States he is able to perform all ADLs with no difficulties and was even able to weed whack and use the   Has been working around the house more with minimal restriction  Able to carry about 20 lbs with no difficulties  He is pleased with his progress     Quality of life: good    Pain  Current pain ratin  At best pain ratin  At worst pain ratin  Location: L shoulder  Quality: dull ache, tight and discomfort  Relieving factors: rest and ice  Aggravating factors: overhead activity  Progression: improved    Social Support  Lives with: spouse    Employment status: working (Working from home for next 5 weeks, mostly sedentary)  Hand dominance: right      Diagnostic Tests  X-ray: normal  MRI studies: abnormal (As above)  Treatments  Current treatment: immobilization and medication  Patient Goals  Patient goals for therapy: decreased pain, increased motion, increased strength, independence with ADLs/IADLs and return to sport/leisure activities (partially met)  Patient goal: Return to PLOF (partially met)        Objective     Static Posture     Head  Forward  Shoulders  Asymmetric shoulders and rounded  Palpation   Left   Hypertonic in the latissimus, teres minor and upper trapezius  Tenderness of the infraspinatus, latissimus and subscapularis  Tenderness     Left Shoulder   No tenderness in the biceps tendon (proximal) and supraspinatus tendon  Cervical/Thoracic Screen   Cervical range of motion within normal limits  Thoracic range of motion within normal limits with the following exceptions: Moderately decreased thoracic extension and rotation    Active Range of Motion   Left Shoulder   Flexion: 140 degrees   Abduction: 145 degrees   External rotation 45°: 50 degrees   External rotation BTH: C4   Internal rotation 90°: 70 degrees   Internal rotation BTB: L3     Right Shoulder   Normal active range of motion    Left Elbow   Normal active range of motion    Right Elbow   Normal active range of motion    Passive Range of Motion     Right Shoulder   Normal passive range of motion    Scapular Mobility   Left Shoulder   Scapular Dyskinesis: grade III  Scapular mobility: fair    Joint Play   Left Shoulder  Joints within functional limits are the posterior capsule  Hypomobile in the anterior capsule and inferior capsule      Strength/Myotome Testing     Left Shoulder     Planes of Motion   Flexion: 4+ (UT compensation )   Abduction: 4 (significant UT compensation )   External rotation at 0°: 4+   Internal rotation at 0°: 4+     Right Shoulder     Planes of Motion   Flexion: 4+   Abduction: 4+   External rotation at 0°: 4+   Internal rotation at 0°: 4+     Left Elbow   Flexion: 5  Extension: 5    Right Elbow   Flexion: 5  Extension: 5    Additional Strength Details  Minimal pain with resisted abduction      Flowsheet Rows      Most Recent Value   PT/OT G-Codes   Current Score  71   Projected Score  69   FOTO information reviewed  Yes        HEP   MedBridge access code: [email or web address]         Precautions: Standard, per Dr Marleen Snowden protocol    Specialty Daily Treatment Diary     Manual 7/2 7/14 7/16 7/17    PROM L shoulder all directions L shoulder all directions L shoulder all directions L shoulder all directions    STM Left teres group and subscap  Subscap, pec minor     Joint Mobs Scap, GH post and if gr III-IV Scap, GH post and if gr III-IV Scap, GH post and if gr III-IV Scap, GH post and if gr III-IV    Scap PNF Upward rotation and post tilt assist with active abduction and flexion x20 ea  Contract relax for ER 6x6" Contract relax for ER 6x6"            Exercise Diary         Scap Retraction Anisha 7 0 Hold 5, 2x10  [place] 7 0 Hold 5, 2x10   Anisha 7 0 Hold 5, 2x10     PROM flexion  Supine 20x5" 2 lbs with cane,  Pulleys 20x Supine 10x10" 2 lbs with cane,  Pulleys 20x Pulley 20x Pulley 20x and cane flex    Cane ER Hold 5, 2x10 Hold 5, 2x10  Hold 5, 2x10    Shoulder ext Anisha 3 0 Hold 5, 2x10  Add [place] 3 0 Hold 5, 2x10      SL ER   Prone I/T 20x3"     TS extension   Prone sh flx 20x3"     Doorway pec stretch Hold 10, 1x10  Hold 10, 1x10   Hold 10, 1x10     Band IR & ER yellow 2x10 ea yellow 2x10 ea Blue IR/ 2x10 ea Blue IR/ 2x10 ea    Wall slides Hold 5, 2x10  Hold 5, 2x10   Functional reach 2 lb 2x10    scaption        Duran's carry        Post capsule stretch Hold 10, 1x5 Hold 10, 1x10      Modalities        CP 10 min 10 min 10 min 10 min

## 2020-07-20 ENCOUNTER — OFFICE VISIT (OUTPATIENT)
Dept: PHYSICAL THERAPY | Facility: CLINIC | Age: 60
End: 2020-07-20
Payer: COMMERCIAL

## 2020-07-20 DIAGNOSIS — Z98.890 S/P LEFT ROTATOR CUFF REPAIR: Primary | ICD-10-CM

## 2020-07-20 PROCEDURE — 97140 MANUAL THERAPY 1/> REGIONS: CPT

## 2020-07-20 PROCEDURE — 97112 NEUROMUSCULAR REEDUCATION: CPT

## 2020-07-20 PROCEDURE — 97110 THERAPEUTIC EXERCISES: CPT

## 2020-07-20 NOTE — PROGRESS NOTES
Daily Note     Today's date: 2020  Patient name: Tammy Adhikari  : 1960  MRN: [id number]  Referring provider: Alex Shelton MD  Dx:   Encounter Diagnosis   Name Primary?  S/P left rotator cuff repair Yes                  Subjective: Pt reports he feels like he has made significant improvements in the past 2 weeks  States he was able to put of vinyl siding over the weekend with minimal difficulties-just fatigue         Objective: See treatment diary below          Precautions: Standard, per Dr Margi Rothman protocol    Specialty Daily Treatment Diary     Manual    PROM L shoulder all directions L shoulder all directions L shoulder all directions L shoulder all directions L shoulder all directions   STM Left teres group and subscap  Subscap, pec minor     Joint Mobs Scap, GH post and if gr III-IV Scap, GH post and if gr III-IV Scap, GH post and if gr III-IV Scap, GH post and if gr III-IV Scap, GH post and if gr III-IV   Scap PNF Upward rotation and post tilt assist with active abduction and flexion x20 ea  Contract relax for ER 6x6" Contract relax for ER 6x6" Contract relax for ER 6x6"           Exercise Diary         Scap Retraction [place] 7 0 Hold 5, 2x10  [place] 7 0 Hold 5, 2x10   [place] 7 0 Hold 5, 2x10  [place] 7 0 Hold 5, 2x10   PROM flexion  Supine 20x5" 2 lbs with cane,  Pulleys 20x Supine 10x10" 2 lbs with cane,  Pulleys 20x Pulley 20x Pulley 20x and cane flex Pulley 20x and cane flex   Cane ER Hold 5, 2x10 Hold 5, 2x10  Hold 5, 2x10 Hold 5, 2x10   Shoulder ext [place] 3 0 Hold 5, 2x10  Add [place] 3 0 Hold 5, 2x10   Add [place] 4 0 Hold 5, 2x10   SL ER   Prone I/T 20x3"  Prone T 20x3"   TS extension   Prone sh flx 20x3"  Prone Y  20x3"   Doorway pec stretch Hold 10, 1x10  Hold 10, 1x10   Hold 10, 1x10  Hold 10, 1x10 into flexion   Band IR & ER yellow 2x10 ea yellow 2x10 ea Blue IR/ 2x10 ea Blue IR/ 2x10 ea Blue IR/ 2x10 ea   Wall slides Hold 5, 2x10  Hold 5, 2x10 Functional reach 2 lb 2x10    scaption        Ball vs wall     At 120 4 way red 2x10 ea   Post capsule stretch Hold 10, 1x5 Hold 10, 1x10      Modalities        CP 10 min 10 min 10 min 10 min 10 min                  Assessment: Progressed pt's program per treatment diary and demonstrates good tolerance  Pt continues with weakness and fatigue with endurance activities overhead  Responds well to contract relax for ER and demonstrating carry over  Plan: Continue with plan of care to decrease pain, improve mobility, strength, and function

## 2020-07-21 ENCOUNTER — APPOINTMENT (OUTPATIENT)
Dept: PHYSICAL THERAPY | Facility: CLINIC | Age: 60
End: 2020-07-21
Payer: COMMERCIAL

## 2020-07-21 ENCOUNTER — OFFICE VISIT (OUTPATIENT)
Dept: OBGYN CLINIC | Facility: CLINIC | Age: 60
End: 2020-07-21
Payer: COMMERCIAL

## 2020-07-21 VITALS
BODY MASS INDEX: 27.63 KG/M2 | SYSTOLIC BLOOD PRESSURE: 146 MMHG | HEART RATE: 68 BPM | TEMPERATURE: 97.1 F | WEIGHT: 204 LBS | HEIGHT: 72 IN | DIASTOLIC BLOOD PRESSURE: 79 MMHG

## 2020-07-21 DIAGNOSIS — Z98.890 S/P LEFT ROTATOR CUFF REPAIR: Primary | ICD-10-CM

## 2020-07-21 PROCEDURE — 99213 OFFICE O/P EST LOW 20 MIN: CPT | Performed by: ORTHOPAEDIC SURGERY

## 2020-07-21 NOTE — PROGRESS NOTES
Assessment/Plan:  1  S/P left rotator cuff repair         The patient is doing well and will continue physical therapy on the RTC repair protocol  He can gradually increase his activity at this point, but should still avoid heavy lifting    We did review his PT notes today  He will follow-up as needed for this  Subjective:   Navi Camp is a 62 y o  male who presents today for follow-up of his left shoulder, now about 4 months status post rotator cuff repair and bicep tenodesis  He is doing much better today and notes minimal pain about the shoulder and improving strength and ROM  He is sleeping well at night now  He notes good sensation of the left upper extremity  Review of Systems   Constitutional: Negative  Negative for chills and fever  HENT: Negative  Negative for ear pain and sore throat  Eyes: Negative  Negative for pain and redness  Respiratory: Negative  Negative for shortness of breath and wheezing  Cardiovascular: Negative for chest pain and palpitations  Gastrointestinal: Negative  Negative for abdominal pain and blood in stool  Endocrine: Negative  Negative for polydipsia and polyuria  Genitourinary: Negative  Negative for difficulty urinating and dysuria  Musculoskeletal:        As noted in HPI   Skin: Negative  Negative for pallor and rash  Neurological: Negative  Negative for dizziness and numbness  Hematological: Negative  Negative for adenopathy  Does not bruise/bleed easily  Psychiatric/Behavioral: Negative  Negative for confusion and suicidal ideas           Past Medical History:   Diagnosis Date    CPAP (continuous positive airway pressure) dependence     Diabetes mellitus ([facility] [address] )     Disease of thyroid gland     hypo    Hypercholesterolemia     Migraines     improved since on verapamil    Pre-diabetes     Sleep apnea        Past Surgical History:   Procedure Laterality Date    CHOLECYSTECTOMY      CYST REMOVAL      on chest    EYE SURGERY  FINGER SURGERY Left     index of left hand    MA SHLDR ARTHROSCOP,SURG,W/ROTAT CUFF REPR Left 3/10/2020    Procedure: REPAIR ROTATOR CUFF  ARTHROSCOPIC WITH SUBACROMIAL DECOMPRESSION, BICEPS TENODESIS;  Surgeon: Luzma Koehler MD;  Location: [facility];  Service: Orthopedics       Family History   Problem Relation Age of Onset    Diabetes Mother     Cancer Mother     Diabetes Father     Cancer Father     No Known Problems Sister     No Known Problems Brother     No Known Problems Maternal Aunt     No Known Problems Maternal Uncle     No Known Problems Paternal Aunt     No Known Problems Paternal Uncle     No Known Problems Maternal Grandmother     No Known Problems Maternal Grandfather     No Known Problems Paternal Grandmother     No Known Problems Paternal Grandfather        Social History     Occupational History    Not on file   Tobacco Use    Smoking status: Never Smoker    Smokeless tobacco: Never Used   Substance and Sexual Activity    Alcohol use: Never     Frequency: Never    Drug use: Never    Sexual activity: Not on file         Current Outpatient Medications:     levothyroxine 175 mcg tablet, Take 175 mcg by mouth daily, Disp: , Rfl:     metFORMIN (GLUCOPHAGE) 1000 MG tablet, Take 500 mg by mouth 2 (two) times a day with meals , Disp: , Rfl:     rosuvastatin (CRESTOR) 10 MG tablet, Take 10 mg by mouth daily, Disp: , Rfl:     sertraline (ZOLOFT) 50 mg tablet, Take 50 mg by mouth daily, Disp: , Rfl:     verapamil (CALAN-SR) 180 mg CR tablet, , Disp: , Rfl:     No Known Allergies    Objective:  Vitals:    07/21/20 1032   BP: 146/79   Pulse: 68   Temp: (!) 97 1 °F (36 2 °C)       Left Shoulder Exam     Tenderness   The patient is experiencing no tenderness       Range of Motion   Active abduction: 140   External rotation: 40   Forward flexion: 150   Internal rotation 0 degrees: L4     Muscle Strength   Abduction: 4/5   Internal rotation: 5/5   External rotation: 5/5 Biceps: 5/5     Tests   Drop arm: negative    Other   Erythema: absent  Sensation: normal  Pulse: present     Comments:  Negative speeds test             Physical Exam   Constitutional: He is oriented to person, place, and time  He appears well-developed and well-nourished  No distress  HENT:   Head: Normocephalic and atraumatic  Eyes: Conjunctivae and EOM are normal  No scleral icterus  Neck: No JVD present  Cardiovascular: Normal rate and intact distal pulses  Pulmonary/Chest: Effort normal  No respiratory distress  Neurological: He is alert and oriented to person, place, and time  Coordination normal    Skin: Skin is warm  Psychiatric: He has a normal mood and affect

## 2020-07-23 ENCOUNTER — OFFICE VISIT (OUTPATIENT)
Dept: PHYSICAL THERAPY | Facility: CLINIC | Age: 60
End: 2020-07-23
Payer: COMMERCIAL

## 2020-07-23 DIAGNOSIS — Z98.890 S/P LEFT ROTATOR CUFF REPAIR: Primary | ICD-10-CM

## 2020-07-23 PROCEDURE — 97112 NEUROMUSCULAR REEDUCATION: CPT

## 2020-07-23 PROCEDURE — 97140 MANUAL THERAPY 1/> REGIONS: CPT

## 2020-07-23 PROCEDURE — 97110 THERAPEUTIC EXERCISES: CPT

## 2020-07-23 NOTE — PROGRESS NOTES
Daily Note     Today's date: 2020  Patient name: Delon Valencia  : 1960  MRN: [id number]  Referring provider: Mallory Hussein MD  Dx:   Encounter Diagnosis   Name Primary?  S/P left rotator cuff repair Yes                  Subjective: Pt reports 2/10 soreness in shoulder if he does not stretch throughout the day  Pain pushing up off the floor  Objective: See treatment diary below        Precautions: Standard, per Dr Silverio Nail protocol    Specialty Daily Treatment Diary     Manual    PROM L shoulder all directions  L shoulder all directions L shoulder all directions L shoulder all directions   STM   Subscap, pec minor     Joint Mobs Scap, GH post and if gr III-IV  Scap, GH post and if gr III-IV Scap, GH post and if gr III-IV Scap, GH post and if gr III-IV   Scap PNF Contract relax for ER 6x6"  Contract relax for ER 6x6" Contract relax for ER 6x6" Contract relax for ER 6x6"           Exercise Diary         Scap Retraction [place] 7 0 Hold 5, 2x10    [place] 7 0 Hold 5, 2x10  [place] 7 0 Hold 5, 2x10   PROM flexion  Supine 20x5" 2 lbs with cane,  Pulleys 20x  Pulley 20x Pulley 20x and cane flex Pulley 20x and cane flex   Cane ER    Hold 5, 2x10 Hold 5, 2x10   Shoulder add [place] 4 0 Hold 5, 2x10    Add [place] 4 0 Hold 5, 2x10   SL ER   Prone I/T 20x3"  Prone T 20x3"   Prone Y     Prone sh flx 20x3"  Prone Y  20x3"   Doorway pec stretch Hold 10, 1x10    Hold 10, 1x10  Hold 10, 1x10 into flexion   Band IR & ER Blue 2x10 ea  Blue IR/ 2x10 ea Blue IR/ 2x10 ea Blue IR/ 2x10 ea   Functional reach 2lb 2x10   Functional reach 2 lb 2x10    Shoulder taps At table, 2x10       Ball vs wall At 120 4 way red 2x10 ea    At 120 4 way red 2x10 ea   Post capsule stretch        Modalities        CP 10 min  10 min 10 min 10 min                   Assessment: Pt tolerated treatment well with minimal complaints of pain   Pt demonstrates significant fatigue and difficulties in weight bearing positions  Added shoulder taps to HEP  Plan: Continue with plan of care to decrease pain, improve mobility, strength, and function

## 2020-07-28 ENCOUNTER — OFFICE VISIT (OUTPATIENT)
Dept: PHYSICAL THERAPY | Facility: CLINIC | Age: 60
End: 2020-07-28
Payer: COMMERCIAL

## 2020-07-28 DIAGNOSIS — Z98.890 S/P LEFT ROTATOR CUFF REPAIR: Primary | ICD-10-CM

## 2020-07-28 PROCEDURE — 97140 MANUAL THERAPY 1/> REGIONS: CPT

## 2020-07-28 PROCEDURE — 97110 THERAPEUTIC EXERCISES: CPT

## 2020-07-28 PROCEDURE — 97112 NEUROMUSCULAR REEDUCATION: CPT

## 2020-07-28 NOTE — PROGRESS NOTES
Daily Note     Today's date: 2020  Patient name: Jo Ferrell  : 1960  MRN: [id number]  Referring provider: Eveline Melton MD  Dx:   Encounter Diagnosis   Name Primary?  S/P left rotator cuff repair Yes                  Subjective: Pt reports he was putting up siding all weekend and mowing the lawn so he has been pretty sore the past 2 days         Objective: See treatment diary below        Precautions: Standard, per Dr Stephany Young protocol    Specialty Daily Treatment Diary     Manual    PROM L shoulder all directions L shoulder all directions  L shoulder all directions L shoulder all directions   STM        Joint Mobs Scap, GH post and if gr III-IV Scap, GH post and if gr III-IV  Scap, GH post and if gr III-IV Scap, GH post and if gr III-IV   Scap PNF Contract relax for ER 6x6" Contract relax for ER 6x6  Contract relax for ER 6x6" Contract relax for ER 6x6"           Exercise Diary         Scap Retraction [place] 7 0 Hold 5, 2x10  [place] 7 0 Hold 5, 2x10   [place] 7 0 Hold 5, 2x10  [place] 7 0 Hold 5, 2x10   PROM flexion  Supine 20x5" 2 lbs with cane,  Pulleys 20x  Supine 20x5" 2 lbs with cane,  Pulleys 20x  [place] 20x and cane flex Pulley 20x and cane flex   Cane ER    Hold 5, 2x10 Hold 5, 2x10   Shoulder add [place] 4 0 Hold 5, 2x10 [place] 4 0 Hold 5, 2x10   Add [place] 4 0 Hold 5, 2x10   SL ER     Prone T 20x3"   Prone Y       Prone Y  20x3"   Doorway pec stretch Hold 10, 1x10  Hold 10, 1x10  Hold 10, 1x10  Hold 10, 1x10 into flexion   Seated ER at 90  1 lb 2x10      Band IR & ER Blue 2x10 ea   Blue IR/ 2x10 ea Blue IR/ 2x10 ea   Functional reach 2lb 2x10 2lb 2x10  Functional reach 2 lb 2x10    Shoulder taps At table, 2x10 At table, 2x10      Ball vs wall At 120 4 way red 2x10 ea At 120 4 way red 2x10 ea   At 120 4 way red 2x10 ea   Post capsule stretch        Modalities        CP 10 min 10 min  10 min 10 min                      Assessment: Pt tolerated treatment well with minimal complaints of pain  Pt continues with end range pain into flexion and significant tightness persists in posterior capsule  Fatigues with shoulder taps in weight bearing positions  Plan: Continue with plan of care to decrease pain, improve mobility, strength, and function

## 2020-07-30 ENCOUNTER — OFFICE VISIT (OUTPATIENT)
Dept: PHYSICAL THERAPY | Facility: CLINIC | Age: 60
End: 2020-07-30
Payer: COMMERCIAL

## 2020-07-30 DIAGNOSIS — Z98.890 S/P LEFT ROTATOR CUFF REPAIR: Primary | ICD-10-CM

## 2020-07-30 PROCEDURE — 97112 NEUROMUSCULAR REEDUCATION: CPT

## 2020-07-30 PROCEDURE — 97110 THERAPEUTIC EXERCISES: CPT

## 2020-07-30 PROCEDURE — 97140 MANUAL THERAPY 1/> REGIONS: CPT

## 2020-07-30 NOTE — PROGRESS NOTES
Daily Note     Today's date: 2020  Patient name: Jo Ferrell  : 1960  MRN: [id number]  Referring provider: Eveline Melton MD  Dx:   Encounter Diagnosis   Name Primary?  S/P left rotator cuff repair Yes                  Subjective: Pt reports he was unable to stretch yesterday so he is a little stiff and sore from working this weekend  Pain is currently rated 0/10        Objective: See treatment diary below        Precautions: Standard, per Dr Stephany Young protocol    Specialty Daily Treatment Diary     Manual    PROM L shoulder all directions L shoulder all directions L shoulder all directions  L shoulder all directions   STM        Joint Mobs Scap, GH post and if gr III-IV Scap, GH post and if gr III-IV Scap, GH post and if gr III-IV  Scap, GH post and if gr III-IV   Scap PNF Contract relax for ER 6x6" Contract relax for ER 6x6 Contract relax for ER 6x6  Contract relax for ER 6x6"           Exercise Diary         Scap Retraction [place] 7 0 Hold 5, 2x10  [place] 7 0 Hold 5, 2x10  [place] 7 0 Hold 5, 2x10   [place] 7 0 Hold 5, 2x10   PROM flexion  Supine 20x5" 2 lbs with cane,  Pulleys 20x  Supine 20x5" 2 lbs with cane,  Pulleys 20x Supine 20x5" 2 lbs with cane,  Pulleys 20x  [place] 20x and cane flex   Cane ER     Hold 5, 2x10   Shoulder add [place] 4 0 Hold 5, 2x10 [place] 4 0 Hold 5, 2x10 [place] 4 0 Hold 5, 2x10  Add [place] 4 0 Hold 5, 2x10   SL ER     Prone T 20x3"   Prone Y       Prone Y  20x3"   Doorway flexion   Hold 10, 1x10  Hold 10, 1x10      Doorway pec stretch Hold 10, 1x10  Hold 10, 1x10 Hold 10, 1x10  Hold 10, 1x10 into flexion   Seated ER at 90  1 lb 2x10 2 lb 2x10     Band IR & ER Blue 2x10 ea    Blue IR/ 2x10 ea   Functional reach 2lb 2x10 2lb 2x10 2lb 2x10     Shoulder taps At table, 2x10 At table, 2x10 At table, 2x10     Ball vs wall At 120 4 way red 2x10 ea At 120 4 way red 2x10 ea At 120 4 way red 2x10 ea  At 120 4 way red 2x10 ea   Overhead ball toss   45 sec x2     Modalities        CP 10 min 10 min   10 min                     Assessment: Pt tolerated treatment well with minimal complaints of pain  Pt's shoulder ROM improves with prolonged stretching exercises  Continues to fatigue with overhead endurance exercises  Plan: Continue with plan of care to decrease pain, improve mobility, strength, and function

## 2020-08-04 ENCOUNTER — OFFICE VISIT (OUTPATIENT)
Dept: PHYSICAL THERAPY | Facility: CLINIC | Age: 60
End: 2020-08-04
Payer: COMMERCIAL

## 2020-08-04 DIAGNOSIS — Z98.890 S/P LEFT ROTATOR CUFF REPAIR: Primary | ICD-10-CM

## 2020-08-04 PROCEDURE — 97140 MANUAL THERAPY 1/> REGIONS: CPT

## 2020-08-04 PROCEDURE — 97112 NEUROMUSCULAR REEDUCATION: CPT

## 2020-08-04 PROCEDURE — 97110 THERAPEUTIC EXERCISES: CPT

## 2020-08-04 NOTE — PROGRESS NOTES
Daily Note     Today's date: 2020  Patient name: Navi Camp  : 1960  MRN: [id number]  Referring provider: Jade Boss MD  Dx:   Encounter Diagnosis   Name Primary?  S/P left rotator cuff repair Yes                  Subjective: Pt reports he worked over the weekend and isn't as sore as the last although he did not work as much  Shoulder feels tight today, no pain        Objective: See treatment diary below        Precautions: Standard, per Dr Arnold Munson protocol    Specialty Daily Treatment Diary     Manual     PROM L shoulder all directions L shoulder all directions L shoulder all directions L shoulder all directions    STM        Joint Mobs Scap, GH post and if gr III-IV Scap, GH post and if gr III-IV Scap, GH post and if gr III-IV Scap, GH post and if gr III-IV    Scap PNF Contract relax for ER 6x6" Contract relax for ER 6x6 Contract relax for ER 6x6             Exercise Diary         Scap Retraction [place] 7 0 Hold 5, 2x10  [place] 7 0 Hold 5, 2x10  [place] 7 0 Hold 5, 2x10  [place] 7 0 Hold 5, 2x10    PROM flexion  Supine 20x5" 2 lbs with cane,  Pulleys 20x  Supine 20x5" 2 lbs with cane,  Pulleys 20x Supine 20x5" 2 lbs with cane,  Pulleys 20x Supine 20x5" 2 lbs with cane,  Pulleys 20x    Cane ER        Shoulder add [place] 4 0 Hold 5, 2x10 [place] 4 0 Hold 5, 2x10 [place] 4 0 Hold 5, 2x10 [place] 4 0 Hold 5, 2x10    SL ER        Prone Y          Doorway flexion   Hold 10, 1x10  Hold 10, 1x10  Hold 10, 1x10     Doorway pec stretch Hold 10, 1x10  Hold 10, 1x10 Hold 10, 1x10 Hold 10, 1x10     Seated ER at 90  1 lb 2x10 2 lb 2x10 And IR-Red  2x10 ea    Band IR & ER Blue 2x10 ea       Functional reach 2lb 2x10 2lb 2x10 2lb 2x10 2lb 2x10    Shoulder taps At table, 2x10 At table, 2x10 At table, 2x10 At table, 2x10    Ball vs wall At 120 4 way red 2x10 ea At 120 4 way red 2x10 ea At 120 4 way red 2x10 ea At 120 4 way red 2x10 ea    Overhead ball toss   45 sec x2 45 sec x2 Modalities        CP 10 min 10 min                           Assessment: Pt tolerated treatment well with minimal complaints of pain  Pt demonstrates fair strength with overhead endurance exercises  Responds well to GHJ mobilization to increase flexion  Plan: Continue with plan of care to decrease pain, improve mobility, strength, and function

## 2020-08-06 ENCOUNTER — OFFICE VISIT (OUTPATIENT)
Dept: PHYSICAL THERAPY | Facility: CLINIC | Age: 60
End: 2020-08-06
Payer: COMMERCIAL

## 2020-08-06 DIAGNOSIS — Z98.890 S/P LEFT ROTATOR CUFF REPAIR: Primary | ICD-10-CM

## 2020-08-06 PROCEDURE — 97110 THERAPEUTIC EXERCISES: CPT

## 2020-08-06 PROCEDURE — 97112 NEUROMUSCULAR REEDUCATION: CPT

## 2020-08-06 PROCEDURE — 97140 MANUAL THERAPY 1/> REGIONS: CPT

## 2020-08-06 NOTE — PROGRESS NOTES
Daily Note     Today's date: 2020  Patient name: Dwayne Zhong  : 1960  MRN: [id number]  Referring provider: Tia Cortes MD  Dx:   Encounter Diagnosis   Name Primary?  S/P left rotator cuff repair Yes                  Subjective: Pt reports he is a little sore but he was able to trim hedges for about an hour yesterday         Objective: See treatment diary below        Precautions: Standard, per Dr Adrian Lofton protocol    Specialty Daily Treatment Diary     Manual    PROM L shoulder all directions L shoulder all directions L shoulder all directions L shoulder all directions L shoulder all direction   STM        Joint Mobs Scap, GH post and if gr III-IV Scap, GH post and if gr III-IV Scap, GH post and if gr III-IV Scap, GH post and if gr III-IV Scap, GH post and if gr III-IV   Scap PNF Contract relax for ER 6x6" Contract relax for ER 6x6 Contract relax for ER 6x6             Exercise Diary         Scap Retraction [place] 7 0 Hold 5, 2x10  [place] 7 0 Hold 5, 2x10  [place] 7 0 Hold 5, 2x10  [place] 7 0 Hold 5, 2x10 [place] 8 0 Hold 5, 2x10   PROM flexion  Supine 20x5" 2 lbs with cane,  Pulleys 20x  Supine 20x5" 2 lbs with cane,  Pulleys 20x Supine 20x5" 2 lbs with cane,  Pulleys 20x Supine 20x5" 2 lbs with cane,  Pulleys 20x Supine 20x5" 2 lbs with cane,  Pulleys 20x   Cane ER        Shoulder add [place] 4 0 Hold 5, 2x10 [place] 4 0 Hold 5, 2x10 [place] 4 0 Hold 5, 2x10 [place] 4 0 Hold 5, 2x10 [place] 4 5 Hold 5, 2x10   SL ER        Prone Y          Doorway flexion   Hold 10, 1x10  Hold 10, 1x10  Hold 10, 1x10  Hold 10, 1x10   Doorway pec stretch Hold 10, 1x10  Hold 10, 1x10 Hold 10, 1x10 Hold 10, 1x10  Hold 10, 1x10   Seated ER at 90  1 lb 2x10 2 lb 2x10 And IR-Red  2x10 ea    Band IR & ER Blue 2x10 ea       Functional reach 2lb 2x10 2lb 2x10 2lb 2x10 2lb 2x10    Shoulder taps At table, 2x10 At table, 2x10 At table, 2x10 At table, 2x10 At table, 2x10   Ball vs wall At 120 4 way red 2x10 ea At 120 4 way red 2x10 ea At 120 4 way red 2x10 ea At 120 4 way red 2x10 ea At 120 4 way red 2x10 ea   Overhead ball toss   45 sec x2 60 sec x2 60 sec x2   Curl and press     2 lb, 2x10   Modalities        CP 10 min 10 min                        Assessment: Pt denies any pain with treatment and tolerated well  Pt demonstrating improved overhead endurance and strength  Fatigued at end of session  Plan: Continue with plan of care to decrease pain, improve mobility, strength, and function

## 2020-08-11 ENCOUNTER — OFFICE VISIT (OUTPATIENT)
Dept: PHYSICAL THERAPY | Facility: CLINIC | Age: 60
End: 2020-08-11
Payer: COMMERCIAL

## 2020-08-11 DIAGNOSIS — Z98.890 S/P LEFT ROTATOR CUFF REPAIR: Primary | ICD-10-CM

## 2020-08-11 PROCEDURE — 97110 THERAPEUTIC EXERCISES: CPT | Performed by: PHYSICAL THERAPIST

## 2020-08-11 PROCEDURE — 97112 NEUROMUSCULAR REEDUCATION: CPT | Performed by: PHYSICAL THERAPIST

## 2020-08-11 NOTE — PROGRESS NOTES
Daily Note     Today's date: 2020  Patient name: Julieta Toscano  : 1960  MRN: [id number]  Referring provider: Patty Rogers MD  Dx:   Encounter Diagnosis     ICD-10-CM    1  S/P left rotator cuff repair  Z98 [id number]                   Subjective: I was doing a lot this weekend with the shoulder so it feels stiffer than normal     Objective: See treatment diary below      Assessment: Tolerated treatment well  Patient limited in all planes of motion and demonstrates significant scapular hiking with elevation of arm greater than 90 degrees against gravity  Plan: Progress treament per protocol        Precautions: Standard, per Dr Agusto Diaz protocol    Specialty Daily Treatment Diary     Manual    PROM L shoulder all directions  L shoulder all directions L shoulder all directions L shoulder all direction   STM        Joint Mobs Sca, GH post and if gr III-IV  Scap, GH post and if gr III-IV Scap, GH post and if gr III-IV Scap, GH post and if gr III-IV   Scap PNF   Contract relax for ER 6x6             Exercise Diary         Scap Retraction   [place] 7 0 Hold 5, 2x10  [place] 7 0 Hold 5, 2x10 [place] 8 0 Hold 5, 2x10   PROM flexion Supine 15x5" 2lb with cane + 10x5" w/bridge   Pulleys 20x  Supine 20x5" 2 lbs with cane,  Pulle[place] 20x Supine 20x5" 2 lbs with cane,  Pulle[place] 20x Supine 20x5" 2 lbs with cane,  Pulleys 20x   Cane ER        Shoulder add   [place] 4 0 Hold 5, 2x10 [place] 4 0 Hold 5, 2x10 [place] 4 5 Hold 5, 2x10   Stand flex/scap/abd to 90 2x10 each prior to scap hiking       Prone Y          Doorway flexion    Hold 10, 1x10  Hold 10, 1x10  Hold 10, 1x10   Doorway pec stretch 3x30"  Hold 10, 1x10 Hold 10, 1x10  Hold 10, 1x10   Seated ER at 90 3x10 2lb  2 lb 2x10 And IR-Red  2x10 ea    Band IR & ER        Functional reach   2lb 2x10 2lb 2x10    Shoulder taps   At table, 2x10 At table, 2x10 At table, 2x10   Ball vs wall   At 120 4 way red 2x10 ea At 120 4 way red 2x10 ea At 120 4 way red 2x10 ea   Overhead ball toss 2x60"  45 sec x2 60 sec x2 60 sec x2   Curl and press     2 lb, 2x10   Modalities        CP

## 2020-08-13 ENCOUNTER — OFFICE VISIT (OUTPATIENT)
Dept: PHYSICAL THERAPY | Facility: CLINIC | Age: 60
End: 2020-08-13
Payer: COMMERCIAL

## 2020-08-13 DIAGNOSIS — Z98.890 S/P LEFT ROTATOR CUFF REPAIR: Primary | ICD-10-CM

## 2020-08-13 PROCEDURE — 97140 MANUAL THERAPY 1/> REGIONS: CPT

## 2020-08-13 PROCEDURE — 97110 THERAPEUTIC EXERCISES: CPT

## 2020-08-13 PROCEDURE — 97112 NEUROMUSCULAR REEDUCATION: CPT

## 2020-08-13 NOTE — PROGRESS NOTES
Daily Note     Today's date: 2020  Patient name: Kaelyn Braden  : 1960  MRN: [id number]  Referring provider: Jody Scruggs MD  Dx:   Encounter Diagnosis     ICD-10-CM    1  S/P left rotator cuff repair  [id number]        Start Time:   Stop Time:   Total time in clinic (min): 45 minutes    Subjective: Pt reports no pain prior to session  Objective: See treatment diary below      Assessment: Tolerated treatment well  Pt tolerated new lower trap exercises with good form and no pain  Improvement with shld flex post       Plan: Progress treament per protocol        Precautions: Standard, per Dr Slim Rinaldi protocol    Specialty Daily Treatment Diary     Manual    PROM L shoulder all directions L shoulder all directions  L shoulder all directions L shoulder all direction   STM        Joint Mobs Sca, GH post and if gr III-IV   Scap, GH post and if gr III-IV Scap, GH post and if gr III-IV   Scap PNF                Exercise Diary         Scap ext  [place] 5 Hold 5, 2x10      Scap Retraction  High row on PB  [place] 10 20x  [place] 7 0 Hold 5, 2x10 [place] 8 0 Hold 5, 2x10   Rope rear delt w/ scap ret  [place] 8 20x      PROM flexion Supine 15x5" 2lb with cane + 10x5" w/bridge   [place] 20x Supine 20x5" 3 lbs with cane,  Pulle[place] 20x  Supine 20x5" 2 lbs with cane,  [place] 20x Supine 20x5" 2 lbs with cane,  Pulleys 20x   Cane ER        Shoulder add    [place] 4 0 Hold 5, 2x10 [place] 4 5 Hold 5, 2x10   Stand flex/scap/abd to 90 2x10 each prior to scap hiking 2x10      Prone Y          Doorway flexion     Hold 10, 1x10  Hold 10, 1x10   Doorway pec stretch 3x30" Hold 5, 10x w/body rotation  Hold 10, 1x10  Hold 10, 1x10   Seated ER at 90 3x10 2lb 3x10 2lb  And IR-Red  2x10 ea    Band IR & ER        Functional reach    2lb 2x10    Shoulder taps    At table, 2x10 At table, 2x10   Ball vs wall    At 120 4 way red 2x10 ea At 120 4 way red 2x10 ea   Overhead ball toss 2x60" 60 sec x2, 2# cuff on L 60 sec x2 60 sec x2   Curl and press     2 lb, 2x10   Modalities        CP

## 2020-08-18 ENCOUNTER — OFFICE VISIT (OUTPATIENT)
Dept: PHYSICAL THERAPY | Facility: CLINIC | Age: 60
End: 2020-08-18
Payer: COMMERCIAL

## 2020-08-18 DIAGNOSIS — Z98.890 S/P LEFT ROTATOR CUFF REPAIR: Primary | ICD-10-CM

## 2020-08-18 PROCEDURE — 97110 THERAPEUTIC EXERCISES: CPT

## 2020-08-18 PROCEDURE — 97140 MANUAL THERAPY 1/> REGIONS: CPT

## 2020-08-18 PROCEDURE — 97112 NEUROMUSCULAR REEDUCATION: CPT

## 2020-08-18 NOTE — PROGRESS NOTES
Daily Note     Today's date: 2020  Patient name: Claudio Wiley  : 1960  MRN: [id number]  Referring provider: Edu Ferris MD  Dx:   Encounter Diagnosis   Name Primary?  S/P left rotator cuff repair Yes                  Subjective: Pt reports 0/10 pain in shoulder  Feels stiff today because he was unable to stretch this morning  Objective: See treatment diary below        Precautions: Standard, per Dr Corrinne Pates protocol    Specialty Daily Treatment Diary     Manual    PROM L shoulder all directions L shoulder all directions L shoulder all directions  L shoulder all direction   STM        Joint Mobs Sca, GH post and if gr III-IV  Scap, GH post and if gr III-IV  Scap, GH post and if gr III-IV   Scap PNF                Exercise Diary         Scap ext  [place]isha 5 Hold 5, 2x10      Scap Retraction  High row on PB  [place] 10 20x [place] 8 0 Hold 5, 2x10  [place]isha 8 0 Hold 5, 2x10   Rope rear delt w/ scap ret  [place] 8 20x [place] 8 20x     PROM flexion Supine 15x5" 2lb with cane + 10x5" w/bridge   [place] 20x Supine 20x5" 3 lbs with cane,  Pulle[place] 20x Supine 20x5" 3 lbs with cane,  Pulleys 20x  Supine 20x5" 2 lbs with cane,  Pulleys 20x   Shoulder add   [place] 4 5 Hold 5, 2x10  [place] 4 5 Hold 5, 2x10   Stand flex/scap/abd to 90 2x10 each prior to scap hiking 2x10      Prone Y          Doorway flexion    Hold 10, 1x10   Hold 10, 1x10   Doorway pec stretch 3x30" Hold 5, 10x w/body rotation Hold 10, 1x10  Hold 10, 1x10   Seated ER at 90 3x10 2lb 3x10 2lb 3x10 red      Shoulder taps   At table, 2x10  At table, 2x10   Ball vs wall   At 120 4 way red 2x10 ea  At 120 4 way red 2x10 ea   Overhead ball toss 2x60" 60 sec x2, 2# cuff on L Unilateral with red  30 x3  60 sec x2   Curl and press   2 lb, 2x10  2 lb, 2x10   Modalities        CP                           Assessment: Pt denies any pain with treatment and tolerated well   Pt continues to progress well with strength and stability in shoulder  Discussed POC and in agreement to transition to HEP  Plan: D/C with comprehensive HEP next visit

## 2020-08-20 ENCOUNTER — OFFICE VISIT (OUTPATIENT)
Dept: PHYSICAL THERAPY | Facility: CLINIC | Age: 60
End: 2020-08-20
Payer: COMMERCIAL

## 2020-08-20 DIAGNOSIS — Z98.890 S/P LEFT ROTATOR CUFF REPAIR: Primary | ICD-10-CM

## 2020-08-20 PROCEDURE — 97140 MANUAL THERAPY 1/> REGIONS: CPT

## 2020-08-20 PROCEDURE — 97110 THERAPEUTIC EXERCISES: CPT

## 2020-08-20 PROCEDURE — 97112 NEUROMUSCULAR REEDUCATION: CPT

## 2020-08-20 NOTE — PROGRESS NOTES
PT Discharge    Today's date: 2020  Patient name: Maranda Dance  : 1960  MRN: [id number]  Referring provider: Steven Sewell MD  Dx:   Encounter Diagnosis     ICD-10-CM    1  S/P left rotator cuff repair  [id number]                   Assessment  Assessment details: Maranda Dance is a 62 y o  male who has been seen in physical therapy for 41 visits secondary to the diagnosis of S/P left rotator cuff repair  (primary encounter diagnosis) and has made steady gains towards established goals  The patient has demonstrated decreased pain level, improved strength upper extremity, improved postural awareness, improved spinal ROM, improved upper extremity ROM, improved flexibility and improved scapular stabilization  As a result he has been able to achieve PLOF  He has reached max potential in PT at this time and is independent in HEP  Symptom irritability: lowUnderstanding of Dx/Px/POC: good   Prognosis: good    Goals  Short Term Goals: Target Date   1  Initiate and advance HEP -MET  2  Increase L shoulder PROM flex to 120 deg /ER 45 deg -MET  3  Increase L elbow AROM to full-MET  4  Pt will be able to discontinue use of sling per MD orders-MET  5  Pt will be indep with all dressing/grooming activity-MET    Long Term Goals: Target Date 2020  1  Indep with HEP - MET  2  Increase L shoulder AROM to full-PARTIALLY MET  3  Pt will be be able perform household chores and landscaping with no restrictions  MET  4  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  5  Pt will be able to return to driving -MET  6  Pt will be able to place 5 lb object overhead- MET      Plan  Plan details: D/C from PT with comprehensive HEP  Planned therapy interventions: home exercise program  Treatment plan discussed with: patient        Subjective Evaluation    History of Present Illness  Mechanism of injury: Pt reports that he feels like he is at 80% of his overall function in shoulder   States he is able to perform all ADLs with no difficulties  Has been able to perform work around the house with minimal difficulties  Pt reports that he continues to feel restricted in his motion overhead but is able to perform all functional activities  Pt feels comfortable continuing with exercises at home  Quality of life: good    Pain  Current pain ratin  At best pain ratin  At worst pain ratin  Location: L shoulder  Quality: dull ache and tight  Relieving factors: rest and ice  Aggravating factors: overhead activity  Progression: improved    Social Support  Lives with: spouse    Employment status: working (Working from home for next 5 weeks, mostly sedentary)  Hand dominance: right      Diagnostic Tests  X-ray: normal  MRI studies: abnormal (As above)  Treatments  Current treatment: immobilization and medication  Patient Goals  Patient goals for therapy: decreased pain, increased motion, increased strength, independence with ADLs/IADLs and return to sport/leisure activities (MET)  Patient goal: Return to PLOF (MET)        Objective     Static Posture     Head  Forward  Shoulders  Asymmetric shoulders and rounded  Palpation   Left   No palpable tenderness to the infraspinatus, latissimus, subscapularis, teres minor and upper trapezius  Hypertonic in the upper trapezius  Tenderness     Left Shoulder   No tenderness in the biceps tendon (proximal) and supraspinatus tendon  Cervical/Thoracic Screen   Cervical range of motion within normal limits  Thoracic range of motion within normal limits with the following exceptions:  Moderately decreased thoracic extension and rotation    Active Range of Motion   Left Shoulder   Flexion: 150 degrees   Abduction: 160 degrees   External rotation 45°: 70 degrees   Internal rotation 90°: 75 degrees     Right Shoulder   Normal active range of motion    Left Elbow   Normal active range of motion    Right Elbow   Normal active range of motion    Passive Range of Motion Right Shoulder   Normal passive range of motion    Scapular Mobility   Left Shoulder   Scapular Dyskinesis: grade III  Scapular mobility: good    Joint Play   Left Shoulder  Joints within functional limits are the posterior capsule  Hypomobile in the anterior capsule and inferior capsule  Strength/Myotome Testing     Left Shoulder     Planes of Motion   Flexion: 5 (UT compensation )   Abduction: 4+ (significant UT compensation )   External rotation at 0°: 5   Internal rotation at 0°: 5     Right Shoulder     Planes of Motion   Flexion: 5   Abduction: 5   External rotation at 0°: 5   Internal rotation at 0°: 5     Left Elbow   Flexion: 5  Extension: 5    Right Elbow   Flexion: 5  Extension: 5    Additional Strength Details  No pain with resisted motion      Flowsheet Rows      Most Recent Value   PT/OT G-Codes   Current Score  78   Projected Score  69   FOTO information reviewed  Yes        HEP   [employer] access code: [email or web address], Current: [email or web address]       Precautions: Standard, per Dr Annie Munoz protocol    Specialty Daily Treatment Diary     Manual 8/11 8/13 8/18 8/20    PROM L shoulder all directions L shoulder all directions L shoulder all directions L shoulder all directions    STM        Joint Mobs Sca, GH post and if gr III-IV  Scap, GH post and if gr III-IV Scap, GH post and if gr III-IV    Scap PNF                Exercise Diary         Scap ext  Anisha 5 Hold 5, 2x10      Scap Retraction  High row on PB  Anisha 10 20x [place] 8 0 Hold 5, 2x10 [place] 8 0 Hold 5, 2x10    Rope rear delt w/ scap ret  [place] 8 20x [place] 8 20x [place] 8 20x    PROM flexion Supine 15x5" 2lb with cane + 10x5" w/bridge   [place]ulle[place] 20x Supine 20x5" 3 lbs with cane,  Pulleys 20x Supine 20x5" 3 lbs with cane,  Pulleys 20x Supine 20x5" 3 lbs with cane,  Pulleys 20x    Shoulder add   Anisha 4 5 Hold 5, 2x10 [place] 4 5 Hold 5, 2x10    Stand flex/scap/abd to 90 2x10 each prior to scap hiking 2x10      Prone Y          Doorway flexion    Hold 10, 1x10  Hold 10, 1x10    Doorway pec stretch 3x30" Hold 5, 10x w/body rotation Hold 10, 1x10 Hold 10, 1x10    Seated ER at 90 3x10 2lb 3x10 2lb 3x10 red      Shoulder taps   At table, 2x10     Ball vs wall   At 120 4 way red 2x10 ea At 120 4 way red 2x10 ea    Overhead ball toss 2x60" 60 sec x2, 2# cuff on L Unilateral with red  30 x3 Unilateral with red  30 x3    Curl and press   2 lb, 2x10 2 lb, 2x10    Modalities        CP

## 2020-09-29 ENCOUNTER — OFFICE VISIT (OUTPATIENT)
Dept: OBGYN CLINIC | Facility: CLINIC | Age: 60
End: 2020-09-29
Payer: COMMERCIAL

## 2020-09-29 VITALS — BODY MASS INDEX: 27.67 KG/M2 | HEIGHT: 72 IN | TEMPERATURE: 97.5 F

## 2020-09-29 DIAGNOSIS — Z98.890 S/P LEFT ROTATOR CUFF REPAIR: Primary | ICD-10-CM

## 2020-09-29 PROCEDURE — 99213 OFFICE O/P EST LOW 20 MIN: CPT | Performed by: ORTHOPAEDIC SURGERY

## 2020-09-29 NOTE — PROGRESS NOTES
Assessment/Plan:  1  S/P left rotator cuff repair         Scribe Attestation    I,:   Justo Estevez am acting as a scribe while in the presence of the attending physician :        I,:   Milton Reyes MD personally performed the services described in this documentation    as scribed in my presence :          I do believe Delon Tarango has progressed well 7 months status post   He does demonstrate slight weakness with abduction testing, which I do recommend he continue with his home exercise program provided by physical therapy  He may continue his activities of daily living as his pain tolerates  I did advise him to take Tylenol/Advil or ice/heat his shoulder as needed for pain relief  He may follow up on an as-needed basis  Subjective:   Efren Quintana is a 62 y o  male who presents to the office today for 7 months status post left shoulder arthroscopy with rotator cuff repair, biceps tenodesis and subacromial decompression  He states he progressed well postoperatively  He states roughly 3 weeks ago he increased his physical labor while working around the house  With this, he did appreciate increased soreness  He states he did take a week off in has appreciate decreased symptoms  He states when he does have discomfort, he localizes it along the superior and lateral aspect of his shoulder  He describes his pain as intermittent, sore and mild in intensity  He states his symptoms are better with activity and worse with rest   He notes taking Advil as needed for pain relief  He denies any radicular symptoms  He does appreciate occasional numbness and tingling to the tips of all his fingers that are infrequent and last for a couple seconds  Review of Systems   Constitutional: Negative for chills, fever and unexpected weight change  HENT: Negative for hearing loss, nosebleeds and sore throat  Eyes: Negative for pain, redness and visual disturbance     Respiratory: Negative for cough, shortness of breath and wheezing  Cardiovascular: Negative for chest pain, palpitations and leg swelling  Gastrointestinal: Negative for abdominal pain, nausea and vomiting  Endocrine: Negative for polyphagia and polyuria  Genitourinary: Negative for dysuria and hematuria  Musculoskeletal:        See HPI   Skin: Negative for rash and wound  Neurological: Negative for dizziness, numbness and headaches  Psychiatric/Behavioral: Negative for decreased concentration and suicidal ideas  The patient is not nervous/anxious            Past Medical History:   Diagnosis Date    CPAP (continuous positive airway pressure) dependence     Diabetes mellitus (HCC)     Disease of thyroid gland     hypo    Hypercholesterolemia     Migraines     improved since on verapamil    Pre-diabetes     Sleep apnea        Past Surgical History:   Procedure Laterality Date    CHOLECYSTECTOMY      CYST REMOVAL      on chest    EYE SURGERY      FINGER SURGERY Left     index of left hand    [facility] SHLDR ARTHROSCOP,SURG,W/ROTAT CUFF REPR Left 3/10/2020    Procedure: REPAIR ROTATOR CUFF  ARTHROSCOPIC WITH SUBACROMIAL DECOMPRESSION, BICEPS TENODESIS;  Surgeon: Ricky Villareal MD;  Location: [facility];  Service: Orthopedics       Family History   Problem Relation Age of Onset    Diabetes Mother     Cancer Mother     Diabetes Father     Cancer Father     No Known Problems Sister     No Known Problems Brother     No Known Problems Maternal Aunt     No Known Problems Maternal Uncle     No Known Problems Paternal Aunt     No Known Problems Paternal Uncle     No Known Problems Maternal Grandmother     No Known Problems Maternal Grandfather     No Known Problems Paternal Grandmother     No Known Problems Paternal Grandfather        Social History     Occupational History    Not on file   Tobacco Use    Smoking status: Never Smoker    Smokeless tobacco: Never Used   Substance and Sexual Activity    Alcohol use: Never Frequency: Never    Drug use: Never    Sexual activity: Not on file         Current Outpatient Medications:     levothyroxine 175 mcg tablet, Take 175 mcg by mouth daily, Disp: , Rfl:     metFORMIN (GLUCOPHAGE) 500 mg tablet, 500 mg 2 (two) times a day , Disp: , Rfl:     rosuvastatin (CRESTOR) 10 MG tablet, Take 10 mg by mouth daily, Disp: , Rfl:     sertraline (ZOLOFT) 50 mg tablet, Take 50 mg by mouth daily, Disp: , Rfl:     verapamil (CALAN-SR) 180 mg CR tablet, , Disp: , Rfl:     No Known Allergies    Objective:  Vitals:    09/29/20 1054   Temp: 97 5 °F (36 4 °C)       Left Shoulder Exam     Tenderness   The patient is experiencing no tenderness  Range of Motion   Active abduction: 140   External rotation: 50   Forward flexion: 150   Internal rotation 0 degrees: L3     Muscle Strength   Left shoulder normal muscle strength: Abduction: 4+/5  Internal rotation: 5/5   External rotation: 5/5     Tests   Burton test: negative  Impingement: negative    Other   Erythema: absent  Scars: present (Well healed surgical incisions )  Sensation: normal  Pulse: present (2+ radial)             Physical Exam  Vitals signs and nursing note reviewed  Constitutional:       Appearance: He is well-developed  HENT:      Head: Normocephalic and atraumatic  Eyes:      Conjunctiva/sclera: Conjunctivae normal       Pupils: Pupils are equal, round, and reactive to light  Neck:      Musculoskeletal: Normal range of motion and neck supple  Cardiovascular:      Rate and Rhythm: Normal rate  Pulmonary:      Effort: Pulmonary effort is normal  No respiratory distress  Musculoskeletal:      Comments: As noted in HPI   Skin:     General: Skin is warm and dry  Neurological:      Mental Status: He is alert and oriented to person, place, and time     Psychiatric:         Behavior: Behavior normal

## 2020-11-17 ENCOUNTER — OFFICE VISIT (OUTPATIENT)
Dept: OBGYN CLINIC | Facility: CLINIC | Age: 60
End: 2020-11-17
Payer: COMMERCIAL

## 2020-11-17 VITALS
SYSTOLIC BLOOD PRESSURE: 129 MMHG | HEIGHT: 72 IN | BODY MASS INDEX: 28.04 KG/M2 | WEIGHT: 207 LBS | DIASTOLIC BLOOD PRESSURE: 73 MMHG | HEART RATE: 67 BPM

## 2020-11-17 DIAGNOSIS — Z98.890 S/P LEFT ROTATOR CUFF REPAIR: Primary | ICD-10-CM

## 2020-11-17 DIAGNOSIS — M75.52 SUBACROMIAL BURSITIS OF LEFT SHOULDER JOINT: ICD-10-CM

## 2020-11-17 PROCEDURE — 99214 OFFICE O/P EST MOD 30 MIN: CPT | Performed by: ORTHOPAEDIC SURGERY

## 2021-03-10 DIAGNOSIS — Z23 ENCOUNTER FOR IMMUNIZATION: ICD-10-CM

## 2021-03-17 ENCOUNTER — IMMUNIZATIONS (OUTPATIENT)
Dept: FAMILY MEDICINE CLINIC | Facility: HOSPITAL | Age: 61
End: 2021-03-17

## 2021-03-17 DIAGNOSIS — Z23 ENCOUNTER FOR IMMUNIZATION: Primary | ICD-10-CM

## 2021-03-17 PROCEDURE — 91301 SARS-COV-2 / COVID-19 MRNA VACCINE (MODERNA) 100 MCG: CPT | Performed by: STUDENT IN AN ORGANIZED HEALTH CARE EDUCATION/TRAINING PROGRAM

## 2021-03-17 PROCEDURE — 0011A SARS-COV-2 / COVID-19 MRNA VACCINE (MODERNA) 100 MCG: CPT | Performed by: STUDENT IN AN ORGANIZED HEALTH CARE EDUCATION/TRAINING PROGRAM

## 2021-04-16 ENCOUNTER — IMMUNIZATIONS (OUTPATIENT)
Dept: FAMILY MEDICINE CLINIC | Facility: HOSPITAL | Age: 61
End: 2021-04-16

## 2021-04-16 DIAGNOSIS — Z23 ENCOUNTER FOR IMMUNIZATION: Primary | ICD-10-CM

## 2021-04-16 PROCEDURE — 91301 SARS-COV-2 / COVID-19 MRNA VACCINE (MODERNA) 100 MCG: CPT

## 2021-04-16 PROCEDURE — 0012A SARS-COV-2 / COVID-19 MRNA VACCINE (MODERNA) 100 MCG: CPT

## 2021-06-15 ENCOUNTER — OFFICE VISIT (OUTPATIENT)
Dept: OBGYN CLINIC | Facility: CLINIC | Age: 61
End: 2021-06-15
Payer: COMMERCIAL

## 2021-06-15 VITALS — TEMPERATURE: 96.6 F | BODY MASS INDEX: 29.02 KG/M2 | WEIGHT: 214 LBS

## 2021-06-15 DIAGNOSIS — S46.912A LEFT SHOULDER STRAIN, INITIAL ENCOUNTER: Primary | ICD-10-CM

## 2021-06-15 DIAGNOSIS — Z98.890 S/P LEFT ROTATOR CUFF REPAIR: ICD-10-CM

## 2021-06-15 PROCEDURE — 99213 OFFICE O/P EST LOW 20 MIN: CPT | Performed by: ORTHOPAEDIC SURGERY

## 2021-06-15 PROCEDURE — 1036F TOBACCO NON-USER: CPT | Performed by: ORTHOPAEDIC SURGERY

## 2021-06-15 NOTE — PROGRESS NOTES
Assessment/Plan:  1  Left shoulder strain, initial encounter     2  S/P left rotator cuff repair         Scribe Attestation    I,:  Preston Mckee am acting as a scribe while in the presence of the attending physician :       I,:  Milton Reyes MD personally performed the services described in this documentation    as scribed in my presence :         Delon Tarango upon examination does appear to have strained his left shoulder  I did review his operative note from 3/10/2020 that does remark on a significant rotator cuff tear that utilized a speed bridge technique for repair  He does demonstrate mild weakness into abduction  However, this could be associated with the previous large rotator cuff tear versus a new injury  I am encouraged that he did not experience a painful pop, swelling, or bruising at the time of the injury  I do believe he may resume his home exercises that he learned from physical therapy to focus on range of motion and strengthening of the shoulder  I do also recommend that he ease back into his construction activities at home as he regains his strength and range of motion into her shoulder  Otherwise, he may participate in activities of daily living as tolerated  Delon Tarango verbalized understanding and had no further questions  I will see him back on an as-needed basis    Subjective:   Efren Quintana is a 56 y o  male who presents to the office today for  follow-up evaluation of his left shoulder  He does have a history of a rotator cuff repair the left shoulder utilizing a speed bridge technique in biceps tenodesis completed 15 months ago  He states he is doing very well up to approximately 2-3 weeks ago  He is very active at home undergoing construction projects and states that he has been working on a deck  He states that he attempted to catch it board that was falling and felt some discomfort into his shoulder when he caught the board    He denies a pop, or snap at the time of the incident  He also denies any subsequent bruising or swelling into the shoulder or arm  He states that he is experiencing intermittent and mild discomfort at the anterior and superior aspect of his shoulder with overhead reaching activities  He has developed mild stiffness in range of motion  He also remarks on a mild but abnormal sensation extending into his fingers  However, denies numbness of the fingers  He states that his pain has been progressively improving  However, was concerned due to the significant surgery that he had undergone previously  Review of Systems   Constitutional: Negative for chills, fever and unexpected weight change  HENT: Negative for hearing loss, nosebleeds and sore throat  Eyes: Negative for pain, redness and visual disturbance  Respiratory: Negative for cough, shortness of breath and wheezing  Cardiovascular: Negative for chest pain, palpitations and leg swelling  Gastrointestinal: Negative for abdominal pain, nausea and vomiting  Endocrine: Negative for polyphagia and polyuria  Genitourinary: Negative for dysuria and hematuria  Musculoskeletal: Positive for myalgias  See HPI   Skin: Negative for rash and wound  Neurological: Negative for dizziness, numbness and headaches  Psychiatric/Behavioral: Negative for decreased concentration and suicidal ideas  The patient is not nervous/anxious            Past Medical History:   Diagnosis Date    CPAP (continuous positive airway pressure) dependence     Diabetes mellitus ([facility] [address] )     Disease of thyroid gland     hypo    Hypercholesterolemia     Migraines     improved since on verapamil    Pre-diabetes     Sleep apnea        Past Surgical History:   Procedure Laterality Date    CHOLECYSTECTOMY      CYST REMOVAL      on chest    EYE SURGERY      FINGER SURGERY Left     index of left hand    [facility] SHL[facility] ARTHROSCOP,SURG,W/ROTAT CUFF REPR Left 3/10/2020    Procedure: REPAIR ROTATOR CUFF  ARTHROSCOPIC WITH SUBACROMIAL DECOMPRESSION, BICEPS TENODESIS;  Surgeon: Lucita Horner MD;  Location: [facility];  Service: Orthopedics       Family History   Problem Relation Age of Onset    Diabetes Mother     Cancer Mother     Diabetes Father     Cancer Father     No Known Problems Sister     No Known Problems Brother     No Known Problems Maternal Aunt     No Known Problems Maternal Uncle     No Known Problems Paternal Aunt     No Known Problems Paternal Uncle     No Known Problems Maternal Grandmother     No Known Problems Maternal Grandfather     No Known Problems Paternal Grandmother     No Known Problems Paternal Grandfather        Social History     Occupational History    Not on file   Tobacco Use    Smoking status: Never Smoker    Smokeless tobacco: Never Used   Vaping Use    Vaping Use: Never used   Substance and Sexual Activity    Alcohol use: Never    Drug use: Never    Sexual activity: Not on file         Current Outpatient Medications:     levothyroxine 175 mcg tablet, Take 175 mcg by mouth daily, Disp: , Rfl:     metFORMIN (GLUCOPHAGE) 500 mg tablet, 500 mg 2 (two) times a day , Disp: , Rfl:     rosuvastatin (CRESTOR) 10 MG tablet, Take 10 mg by mouth daily, Disp: , Rfl:     sertraline (ZOLOFT) 50 mg tablet, Take 50 mg by mouth daily, Disp: , Rfl:     verapamil (CALAN-SR) 180 mg CR tablet, , Disp: , Rfl:     No Known Allergies    Objective:  Vitals:    06/15/21 0849   Temp: (!) 96 6 °F (35 9 °C)       Left Shoulder Exam     Tenderness   Left shoulder tenderness location:  mild tenderness anteriorly  Range of Motion   Active abduction: 150   External rotation: 70   Internal rotation 0 degrees: L2     Muscle Strength   Abduction: 4/5   Internal rotation: 5/5   External rotation: 5/5     Tests   Burton test: negative  Impingement: negative  Drop arm: negative    Other   Erythema: absent  Scars: present  Sensation: normal  Pulse: present             Physical Exam  Vitals reviewed  HENT:      Head: Normocephalic and atraumatic  Eyes:      General:         Right eye: No discharge  Left eye: No discharge  Conjunctiva/sclera: Conjunctivae normal       Pupils: Pupils are equal, round, and reactive to light  Cardiovascular:      Rate and Rhythm: Normal rate  Pulmonary:      Effort: Pulmonary effort is normal  No respiratory distress  Musculoskeletal:      Cervical back: Normal range of motion and neck supple  Comments:  As noted in HPI   Skin:     General: Skin is warm and dry  Neurological:      Mental Status: He is alert and oriented to person, place, and time     Psychiatric:         Mood and Affect: Mood normal          Behavior: Behavior normal

## 2021-07-05 ENCOUNTER — APPOINTMENT (OUTPATIENT)
Dept: RADIOLOGY | Facility: CLINIC | Age: 61
End: 2021-07-05
Attending: FAMILY MEDICINE
Payer: COMMERCIAL

## 2021-07-05 ENCOUNTER — OFFICE VISIT (OUTPATIENT)
Dept: URGENT CARE | Facility: CLINIC | Age: 61
End: 2021-07-05
Payer: COMMERCIAL

## 2021-07-05 VITALS
SYSTOLIC BLOOD PRESSURE: 143 MMHG | DIASTOLIC BLOOD PRESSURE: 84 MMHG | RESPIRATION RATE: 18 BRPM | BODY MASS INDEX: 28.71 KG/M2 | TEMPERATURE: 97.5 F | WEIGHT: 212 LBS | HEIGHT: 72 IN | OXYGEN SATURATION: 100 % | HEART RATE: 69 BPM

## 2021-07-05 DIAGNOSIS — S80.811A ABRASION, RIGHT LOWER LEG, INITIAL ENCOUNTER: ICD-10-CM

## 2021-07-05 DIAGNOSIS — S89.91XA RIGHT LEG INJURY, INITIAL ENCOUNTER: ICD-10-CM

## 2021-07-05 DIAGNOSIS — S80.11XA CONTUSION OF RIGHT TIBIA: Primary | ICD-10-CM

## 2021-07-05 PROBLEM — E66.3 OVERWEIGHT WITH BODY MASS INDEX (BMI) 25.0-29.9: Status: ACTIVE | Noted: 2018-01-23

## 2021-07-05 PROBLEM — I10 ESSENTIAL HYPERTENSION: Status: ACTIVE | Noted: 2019-12-10

## 2021-07-05 PROCEDURE — 96372 THER/PROPH/DIAG INJ SC/IM: CPT | Performed by: FAMILY MEDICINE

## 2021-07-05 PROCEDURE — 90471 IMMUNIZATION ADMIN: CPT

## 2021-07-05 PROCEDURE — 90715 TDAP VACCINE 7 YRS/> IM: CPT

## 2021-07-05 PROCEDURE — 73590 X-RAY EXAM OF LOWER LEG: CPT

## 2021-07-05 PROCEDURE — 99213 OFFICE O/P EST LOW 20 MIN: CPT | Performed by: FAMILY MEDICINE

## 2021-07-05 NOTE — PATIENT INSTRUCTIONS
1  Contusion and Abrasion of right tibia  - xray of the right tibia/fibula shows no acute osseous abnormalities  - wound site was cleaned, Bacitracin with dressing applied, patient was instructed to wash the wound site daily, keep clean and dry, apply topical Neosporin to the site, and keep covered until healed     - Tdap vaccine administered in office today   - ace wrap applied to the leg for comfort and support, use as directed  - rest the leg and keep it elevated  - apply ice to the site  - take Tylenol or Motrin as needed for pain   - follow up w/ PCP for re-check in 3-5 days

## 2021-07-05 NOTE — PROGRESS NOTES
[email or web address]        NAME: Tom Young is a 56 y o  male  : 1960    MRN: [id number]  DATE: 2021  TIME: 9:44 AM    Assessment and Plan   Contusion of right tibia [S80 11XA]  1  Contusion of right tibia  XR tibia fibula 2 vw right   2  Abrasion, right lower leg, initial encounter  TDAP VACCINE GREATER THAN OR EQUAL TO 8YO IM         Patient Instructions     Patient Instructions   1  Contusion and Abrasion of right tibia  - xray of the right tibia/fibula shows no acute osseous abnormalities  - wound site was cleaned, Bacitracin with dressing applied, patient was instructed to wash the wound site daily, keep clean and dry, apply topical Neosporin to the site, and keep covered until healed  - Tdap vaccine administered in office today   - ace wrap applied to the leg for comfort and support, use as directed  - rest the leg and keep it elevated  - apply ice to the site  - take Tylenol or Motrin as needed for pain   - follow up w/ PCP for re-check in 3-5 days     Follow up with PCP in 3-5 days  Proceed to  ER if symptoms worsen  Chief Complaint     Chief Complaint   Patient presents with    Leg Injury     Pt states he fell 1 week ago and hit his right shin pt has an abrasion at the site  Per pt last TD was 15 years ago         History of Present Illness       55 yo male presents for injury of the right lower leg that occurred while at home 1 week ago  Patient states he was working outdoors and hit his right anterior tibial region against a metal object  He states there was a skin abrasion at the site which was bleeding at the time  He has had pain and swelling at the site  The swelling is improved  He states there was never any bruising at the site  No redness of the skin  No ongoing bleeding or any discharge from the site  No fever/chills  No pain radiating down the leg  No numbness/tingling or weakness of the leg  He is able to walk and bear weight on the leg   He states he has been taking Advil as needed for the pain and has been keeping the wound site clean  He thinks his last Tetanus shot was 15 years ago, no Tdap record available in Epic  Review of Systems   Review of Systems   Constitutional: Negative  Respiratory: Negative  Cardiovascular: Negative  Gastrointestinal: Negative  Musculoskeletal:        As noted in HPI   Skin:        As noted in HPI   Allergic/Immunologic: Negative  Neurological: Negative  Hematological: Negative            Current Medications       Current Outpatient Medications:     levothyroxine 175 mcg tablet, Take 175 mcg by mouth daily, Disp: , Rfl:     metFORMIN (GLUCOPHAGE) 500 mg tablet, 500 mg 2 (two) times a day , Disp: , Rfl:     rosuvastatin (CRESTOR) 10 MG tablet, Take 10 mg by mouth daily, Disp: , Rfl:     sertraline (ZOLOFT) 50 mg tablet, Take 50 mg by mouth daily, Disp: , Rfl:     verapamil (CALAN-SR) 180 mg CR tablet, , Disp: , Rfl:     Current Allergies     Allergies as of 07/05/2021    (No Known Allergies)            The following portions of the patient's history were reviewed and updated as appropriate: allergies, current medications, past family history, past medical history, past social history, past surgical history and problem list      Past Medical History:   Diagnosis Date    CPAP (continuous positive airway pressure) dependence     Diabetes mellitus ([address] )     Disease of thyroid gland     hypo    Hypercholesterolemia     Migraines     improved since on verapamil    Pre-diabetes     Sleep apnea        Past Surgical History:   Procedure Laterality Date    CHOLECYSTECTOMY      CYST REMOVAL      on chest    EYE SURGERY      FINGER SURGERY Left     index of left hand    CT SHLDR ARTHROSCOP,SURG,W/ROTAT CUFF REPR Left 3/10/2020    Procedure: REPAIR ROTATOR CUFF  ARTHROSCOPIC WITH SUBACROMIAL DECOMPRESSION, BICEPS TENODESIS;  Surgeon: Mayte Gao MD;  Location: [address] [place] [address];  Service: Orthopedics       Family History   Problem Relation Age of Onset    Diabetes Mother     Cancer Mother     Diabetes Father     Cancer Father     No Known Problems Sister     No Known Problems Brother     No Known Problems Maternal Aunt     No Known Problems Maternal Uncle     No Known Problems Paternal Aunt     No Known Problems Paternal Uncle     No Known Problems Maternal Grandmother     No Known Problems Maternal Grandfather     No Known Problems Paternal Grandmother     No Known Problems Paternal Grandfather          Medications have been verified  Objective   /84 (BP Location: Right arm, Patient Position: Sitting, Cuff Size: Large)   Pulse 69   Temp 97 5 °F (36 4 °C)   Resp 18   Ht 6' (1 829 m)   Wt 96 2 kg (212 lb)   SpO2 100%   BMI 28 75 kg/m²   No LMP for male patient  Physical Exam     Physical Exam  Vitals and nursing note reviewed  Constitutional:       General: He is awake  He is not in acute distress  Appearance: Normal appearance  He is well-developed, well-groomed and normal weight  He is not ill-appearing, toxic-appearing or diaphoretic  Cardiovascular:      Rate and Rhythm: Normal rate  Pulses: Normal pulses  Pulmonary:      Effort: Pulmonary effort is normal  No tachypnea, accessory muscle usage or respiratory distress  Musculoskeletal:      Comments: Right lower leg: there is mild swelling and moderate tenderness to palpation of the lower anterior tibial region  No bruising present  There is a small ~1 cm skin abrasion present which is scabbed over  No erythema of the skin  The skin is not hot to touch  No drainage or bleeding from the site  No sign of cellulitis  Skin:     General: Skin is warm and dry  Capillary Refill: Capillary refill takes less than 2 seconds  Findings: Abrasion present  No abscess, bruising, ecchymosis, erythema or rash  Comments: There is a round ~1cm skin abrasion present on the right anterior tibia  Scabbing present   No immediate surrounding erythema or swelling  Non-fluctuant  No drainage or bleeding from the site  Neurological:      Mental Status: He is alert and oriented to person, place, and time  Mental status is at baseline  Psychiatric:         Attention and Perception: Attention and perception normal          Mood and Affect: Mood and affect normal          Speech: Speech normal          Behavior: Behavior normal  Behavior is cooperative  Thought Content:  Thought content normal          Cognition and Memory: Cognition and memory normal          Judgment: Judgment normal

## 2021-07-06 ENCOUNTER — OFFICE VISIT (OUTPATIENT)
Dept: OBGYN CLINIC | Facility: CLINIC | Age: 61
End: 2021-07-06
Payer: COMMERCIAL

## 2021-07-06 VITALS
HEIGHT: 72 IN | HEART RATE: 62 BPM | SYSTOLIC BLOOD PRESSURE: 147 MMHG | DIASTOLIC BLOOD PRESSURE: 88 MMHG | BODY MASS INDEX: 28.66 KG/M2 | WEIGHT: 211.6 LBS

## 2021-07-06 DIAGNOSIS — S81.801A OPEN WOUND, LOWER LEG, RIGHT, INITIAL ENCOUNTER: ICD-10-CM

## 2021-07-06 DIAGNOSIS — S80.11XA CONTUSION OF RIGHT LOWER LEG, INITIAL ENCOUNTER: Primary | ICD-10-CM

## 2021-07-06 PROCEDURE — 1036F TOBACCO NON-USER: CPT | Performed by: ORTHOPAEDIC SURGERY

## 2021-07-06 PROCEDURE — 3008F BODY MASS INDEX DOCD: CPT | Performed by: ORTHOPAEDIC SURGERY

## 2021-07-06 PROCEDURE — 99214 OFFICE O/P EST MOD 30 MIN: CPT | Performed by: ORTHOPAEDIC SURGERY

## 2021-07-06 NOTE — PROGRESS NOTES
Assessment/Plan:  1  Contusion of right lower leg, initial encounter     2  Open wound, lower leg, right, initial encounter       The patient has no evidence of fracture on Xrays  He also has no signs of infection today  We discussed that our pain concern is his wound, as wounds in this location can take quite a while to heal  He has no signs of infection today, though we did discuss that if he develops and redness, increased pain, drainage, or swelling he should seek medical care as these are signs of infection  He will be on vacation in [place] the next 2 weeks  He can be activity as tolerated, though we advised he not go [place] any [place] until this wound is healed  He will follow-up as needed for this  Subjective:   Norm Sol is a 56 y o  male who presents today for evaluation of his right leg  He was working on his deck 6 days ago and lost his footing and hit his anterior shin on a beam and then it then proceeded to drag up the anterior shin  He notes pain about the anterior shin with pressure to this region and walking, though if he takes advil this pain can almost fully resolve  He did have xrays of the tib/fib yesterday which were negative  We are able to view these images today  He notes a wound about the anterior shin, which seems to be improving  He denies any redness of the wound  Review of Systems   Constitutional: Negative  Negative for chills and fever  HENT: Negative  Negative for ear pain and sore throat  Eyes: Negative  Negative for pain and redness  Respiratory: Negative  Negative for shortness of breath and wheezing  Cardiovascular: Negative for chest pain and palpitations  Gastrointestinal: Negative  Negative for abdominal pain and blood in stool  Endocrine: Negative  Negative for polydipsia and polyuria  Genitourinary: Negative  Negative for difficulty urinating and dysuria  Musculoskeletal:        As noted in HPI   Skin: Negative    Negative for pallor and rash    Neurological: Negative  Negative for dizziness and numbness  Hematological: Negative  Negative for adenopathy  Does not bruise/bleed easily  Psychiatric/Behavioral: Negative  Negative for confusion and suicidal ideas           Past Medical History:   Diagnosis Date    CPAP (continuous positive airway pressure) dependence     Diabetes mellitus (HCC)     Disease of thyroid gland     hypo    Hypercholesterolemia     Migraines     improved since on verapamil    Pre-diabetes     Sleep apnea        Past Surgical History:   Procedure Laterality Date    CHOLECYSTECTOMY      CYST REMOVAL      on chest    EYE SURGERY      FINGER SURGERY Left     index of left hand    AR SHLDR ARTHROSCOP,SURG,W/ROTAT CUFF REPR Left 3/10/2020    Procedure: REPAIR ROTATOR CUFF  ARTHROSCOPIC WITH SUBACROMIAL DECOMPRESSION, BICEPS TENODESIS;  Surgeon: Nina Jacome MD;  Location: [facility] OR;  Service: Orthopedics       Family History   Problem Relation Age of Onset    Diabetes Mother     Cancer Mother     Diabetes Father     Cancer Father     No Known Problems Sister     No Known Problems Brother     No Known Problems Maternal Aunt     No Known Problems Maternal Uncle     No Known Problems Paternal Aunt     No Known Problems Paternal Uncle     No Known Problems Maternal Grandmother     No Known Problems Maternal Grandfather     No Known Problems Paternal Grandmother     No Known Problems Paternal Grandfather        Social History     Occupational History    Not on file   Tobacco Use    Smoking status: Never Smoker    Smokeless tobacco: Never Used   Vaping Use    Vaping Use: Never used   Substance and Sexual Activity    Alcohol use: Never    Drug use: Never    Sexual activity: Not on file         Current Outpatient Medications:     levothyroxine 175 mcg tablet, Take 175 mcg by mouth daily, Disp: , Rfl:     metFORMIN (GLUCOPHAGE) 500 mg tablet, 500 mg 2 (two) times a day , Disp: , Rfl:    rosuvastatin (CRESTOR) 10 MG tablet, Take 10 mg by mouth daily, Disp: , Rfl:     sertraline (ZOLOFT) 50 mg tablet, Take 50 mg by mouth daily, Disp: , Rfl:     verapamil (CALAN-SR) 180 mg CR tablet, , Disp: , Rfl:     No Known Allergies    Objective:  Vitals:    07/06/21 1347   BP: 147/88   Pulse: 62       Ortho Exam   Right lower leg: 1X2 cm superficial circular abrasion anterior shin which has no surrounding erythema or drainage  Tenderness anterior tibia, but no tenderness posterior tibia or fibula  Full ROM of the knee and ankle without pain  Sensation intact right lower extremity  No swelling right lower extremity  Physical Exam  Constitutional:       General: He is not in acute distress  Appearance: He is well-developed  HENT:      Head: Normocephalic and atraumatic  Eyes:      General: No scleral icterus  Conjunctiva/sclera: Conjunctivae normal    Neck:      Vascular: No JVD  Cardiovascular:      Rate and Rhythm: Normal rate  Pulmonary:      Effort: Pulmonary effort is normal  No respiratory distress  Skin:     General: Skin is warm  Neurological:      Mental Status: He is alert and oriented to person, place, and time  Coordination: Coordination normal          I have personally reviewed pertinent films in PACS and my interpretation is as follows:  Xrays right tib/fib from 7/5/21: Negative

## 2021-07-09 ENCOUNTER — TELEPHONE (OUTPATIENT)
Dept: OBGYN CLINIC | Facility: HOSPITAL | Age: 61
End: 2021-07-09

## 2021-07-09 NOTE — TELEPHONE ENCOUNTER
Dr Nery Fernandez    Patient said that his leg is getting more and more red after his fall  He is asking if he can be prescribed an antibiotic? He said its red around the edges and puffy  Its not warm or painful  [facility]erred pharmacy is [facility] in [place], [place] while he's on vacation       # [phone]

## 2021-11-23 ENCOUNTER — IMMUNIZATIONS (OUTPATIENT)
Dept: FAMILY MEDICINE CLINIC | Facility: HOSPITAL | Age: 61
End: 2021-11-23

## 2021-11-23 DIAGNOSIS — Z23 ENCOUNTER FOR IMMUNIZATION: Primary | ICD-10-CM

## 2021-11-23 PROCEDURE — 0064A COVID-19 MODERNA VACC 0.25 ML BOOSTER: CPT

## 2021-11-23 PROCEDURE — 91306 COVID-19 MODERNA VACC 0.25 ML BOOSTER: CPT

## 2022-04-15 ENCOUNTER — IMMUNIZATIONS (OUTPATIENT)
Dept: FAMILY MEDICINE CLINIC | Facility: HOSPITAL | Age: 62
End: 2022-04-15

## 2022-04-15 DIAGNOSIS — Z23 ENCOUNTER FOR IMMUNIZATION: Primary | ICD-10-CM

## 2022-04-15 PROCEDURE — 91306 COVID-19 MODERNA VACC 0.25 ML BOOSTER: CPT

## 2022-04-15 PROCEDURE — 0064A COVID-19 MODERNA VACC 0.25 ML BOOSTER: CPT

## 2022-08-08 ENCOUNTER — OFFICE VISIT (OUTPATIENT)
Dept: OBGYN CLINIC | Facility: CLINIC | Age: 62
End: 2022-08-08
Payer: COMMERCIAL

## 2022-08-08 ENCOUNTER — APPOINTMENT (OUTPATIENT)
Dept: RADIOLOGY | Facility: CLINIC | Age: 62
End: 2022-08-08
Payer: COMMERCIAL

## 2022-08-08 VITALS
HEART RATE: 66 BPM | SYSTOLIC BLOOD PRESSURE: 132 MMHG | BODY MASS INDEX: 28.44 KG/M2 | HEIGHT: 72 IN | DIASTOLIC BLOOD PRESSURE: 80 MMHG | WEIGHT: 210 LBS

## 2022-08-08 DIAGNOSIS — S46.912A LEFT SHOULDER STRAIN, INITIAL ENCOUNTER: ICD-10-CM

## 2022-08-08 DIAGNOSIS — S46.912A LEFT SHOULDER STRAIN, INITIAL ENCOUNTER: Primary | ICD-10-CM

## 2022-08-08 PROCEDURE — 99214 OFFICE O/P EST MOD 30 MIN: CPT | Performed by: PHYSICIAN ASSISTANT

## 2022-08-08 PROCEDURE — 73030 X-RAY EXAM OF SHOULDER: CPT

## 2022-08-08 NOTE — PROGRESS NOTES
Assessment/Plan:  1  Left shoulder strain, initial encounter  XR shoulder 2+ vw left    Ambulatory Referral to Physical Therapy     The patient does appear to have a strain of his left shoulder  I am reassured that he has fairly good range of motion and strength on examination today  I do not suspect a large recurrent tear at this point, however I definitively cannot rule out a small 1  We will start with non operative treatment for this  I did prescribe the patient physical therapy  If he fails to make progress in the next 4-6 weeks we did discuss possible MRI of the shoulder to assess the status of his rotator cuff  Will follow up in 4-6 weeks for repeat evaluation  He can ice and take over-the-counter medications as needed for discomfort  Subjective:   Mela Shelby is a 59 y o  male who presents today for follow-up of his left shoulder  Patient did undergo a large rotator cuff repair over 2 years ago with Dr Annie Munoz  Last weekend a large patio umbrella flew at him during windy weather, and the patient quickly raised up his arm to block the [place] a  He also notes the umbrella hit the anterior aspect of the shoulder during this episode  He notes some increased shoulder pain since that time  This is certainly improved of the last week though  His pain is about the anterior lateral shoulder is worse with overhead movements and activity and better with rest   He feels a tightness about the upper arm  He still notes good range of motion and strength though  He notes good sensation of the left upper extremity  Review of Systems   Constitutional: Negative  Negative for chills and fever  HENT: Negative  Negative for ear pain and sore throat  Eyes: Negative  Negative for pain and redness  Respiratory: Negative  Negative for shortness of breath and wheezing  Cardiovascular: Negative for chest pain and palpitations  Gastrointestinal: Negative    Negative for abdominal pain and blood in stool  Endocrine: Negative  Negative for polydipsia and polyuria  Genitourinary: Negative  Negative for difficulty urinating and dysuria  Musculoskeletal:        As noted in HPI   Skin: Negative  Negative for pallor and rash  Neurological: Negative  Negative for dizziness and numbness  Hematological: Negative  Negative for adenopathy  Does not bruise/bleed easily  Psychiatric/Behavioral: Negative  Negative for confusion and suicidal ideas           Past Medical History:   Diagnosis Date    CPAP (continuous positive airway pressure) dependence     Diabetes mellitus (HCC)     Disease of thyroid gland     hypo    Hypercholesterolemia     Migraines     improved since on verapamil    Pre-diabetes     Sleep apnea        Past Surgical History:   Procedure Laterality Date    CHOLECYSTECTOMY      CYST REMOVAL      on chest    EYE SURGERY      FINGER SURGERY Left     index of left hand    GA SHLDR ARTHROSCOP,SURG,W/ROTAT CUFF REPR Left 3/10/2020    Procedure: REPAIR ROTATOR CUFF  ARTHROSCOPIC WITH SUBACROMIAL DECOMPRESSION, BICEPS TENODESIS;  Surgeon: Ynes Lindsey MD;  Location: [facility];  Service: Orthopedics       Family History   Problem Relation Age of Onset    Diabetes Mother     Cancer Mother     Diabetes Father     Cancer Father     No Known Problems Sister     No Known Problems Brother     No Known Problems Maternal Aunt     No Known Problems Maternal Uncle     No Known Problems Paternal Aunt     No Known Problems Paternal Uncle     No Known Problems Maternal Grandmother     No Known Problems Maternal Grandfather     No Known Problems Paternal Grandmother     No Known Problems Paternal Grandfather        Social History     Occupational History    Not on file   Tobacco Use    Smoking status: Never Smoker    Smokeless tobacco: Never Used   Vaping Use    Vaping Use: Never used   Substance and Sexual Activity    Alcohol use: Never    Drug use: Never    Sexual activity: Not on file         Current Outpatient Medications:     levothyroxine 175 mcg tablet, Take 175 mcg by mouth daily, Disp: , Rfl:     metFORMIN (GLUCOPHAGE) 500 mg tablet, 500 mg 2 (two) times a day , Disp: , Rfl:     rosuvastatin (CRESTOR) 10 MG tablet, Take 10 mg by mouth daily, Disp: , Rfl:     sertraline (ZOLOFT) 50 mg tablet, Take 50 mg by mouth daily, Disp: , Rfl:     verapamil (CALAN-SR) 180 mg CR tablet, , Disp: , Rfl:     No Known Allergies    Objective:  Vitals:    08/08/22 0848   BP: 132/80   Pulse: 66       Left Shoulder Exam     Tenderness   The patient is experiencing no tenderness  Range of Motion   External rotation: 70   Forward flexion: 160   Internal rotation 0 degrees: T12     Muscle Strength   Left shoulder normal muscle strength: 4+ strength shoulder abduction  Internal rotation: 5/5   External rotation: 5/5     Tests   Drop arm: negative    Other   Erythema: absent  Sensation: normal  Pulse: present             Physical Exam  Constitutional:       General: He is not in acute distress  Appearance: He is well-developed  HENT:      Head: Normocephalic and atraumatic  Eyes:      General: No scleral icterus  Conjunctiva/sclera: Conjunctivae normal    Neck:      Vascular: No JVD  Cardiovascular:      Rate and Rhythm: Normal rate  Pulmonary:      Effort: Pulmonary effort is normal  No respiratory distress  Skin:     General: Skin is warm  Neurological:      Mental Status: He is alert and oriented to person, place, and time  Coordination: Coordination normal          I have personally reviewed pertinent films in PACS and my interpretation is as follows:   X-rays left shoulder:  No acute osseous abnormality

## 2022-08-11 ENCOUNTER — EVALUATION (OUTPATIENT)
Dept: PHYSICAL THERAPY | Facility: CLINIC | Age: 62
End: 2022-08-11
Payer: COMMERCIAL

## 2022-08-11 DIAGNOSIS — S46.912A LEFT SHOULDER STRAIN, INITIAL ENCOUNTER: ICD-10-CM

## 2022-08-11 PROCEDURE — 97161 PT EVAL LOW COMPLEX 20 MIN: CPT

## 2022-08-11 PROCEDURE — 97110 THERAPEUTIC EXERCISES: CPT

## 2022-08-11 NOTE — PROGRESS NOTES
PT Evaluation     Today's date: 2022  Patient name: Navi Camp  : 1960  MRN: [id number]  Referring provider: Yajaira Maya  Dx:   Encounter Diagnosis     ICD-10-CM    1  Left shoulder strain, initial encounter  U56 810A Ambulatory Referral to Physical Therapy       Start Time:   Stop Time: 1100  Total time in clinic (min): 45 minutes    Assessment  Assessment details: Navi Camp is a 59 y o  male who presents with pain, decreased strength, decreased ROM and decreased joint mobility in L shoulder  Due to these impairments, patient has difficulty performing a/iadls, recreational activities and engaging in social activities  Patient's clinical presentation is consistent with their referring diagnosis of Left shoulder strain  Patient has been educated in home exercise program and plan of care  Patient would benefit from skilled physical therapy services to address their aforementioned functional limitations and progress towards prior level of function and independence with home exercise program    Impairments: abnormal muscle firing, abnormal or restricted ROM, activity intolerance, impaired physical strength, lacks appropriate home exercise program and pain with function  Understanding of Dx/Px/POC: good   Prognosis: good    Goals  Short Term Goals: Target Date 22  1  Pt will initiate and advance HEP  2  Pt will demonstrate improved AROM in L shoulder flx/abd   3  Pt will report abolished pain at rest    4  Pt will demonstrate (-) ULTT  Long Term Goals: Target Date 22  1  Pt will demonstrate independence in HEP  2  Pt will demonstrate improved overhead lifting mechanics  3  Pt will report return to yard work without limitations         Plan  Patient would benefit from: skilled PT  Planned modality interventions: cryotherapy, electrical stimulation/Russian stimulation and thermotherapy: hydrocollator packs  Planned therapy interventions: joint mobilization, manual therapy, patient education, postural training, activity modification, abdominal trunk stabilization, body mechanics training, flexibility, functional ROM exercises, graded exercise, home exercise program, neuromuscular re-education, strengthening, stretching, therapeutic activities, therapeutic exercise, motor coordination training, muscle pump exercises, gait training, balance/weight bearing training, ADL training and breathing training  Frequency: 2x week  Duration in weeks: 6  Treatment plan discussed with: patient        Subjective Evaluation    History of Present Illness  Date of onset: 2022  Mechanism of injury: Pt reports about 10 days ago he had an accident with the pool umbrella  The umbrella flew into his L shoulder/upper arm  Pt reports initial pain was into L arm  Pt has a history of L shoulder sx and followed up with Dr Mick Turcios  Upon examination pt was diagnosed with L shoulder strain, and referred for conservative treatment  Currently pt reports pain is in L shoulder 3/10 at worst  Pt reports intemrittent tingling into L hand  Pt reports symptoms have improved since initial incident     Quality of life: excellent    Pain  Current pain rating: 3  At best pain ratin  At worst pain rating: 3  Location: L shoulder  Quality: dull ache and tight (tingling)  Relieving factors: rest  Aggravating factors: overhead activity and lifting  Progression: improved    Social Support  Steps to enter house: yes  Stairs in house: yes   Lives in: multiple-level home  Lives with: spouse    Employment status: working  Hand dominance: right  Exercise history: 12-18 min elliptical 6x/week, lawn work      Diagnostic Tests  X-ray: normal  Treatments  Previous treatment: physical therapy  Patient Goals  Patient goals for therapy: decreased pain, independence with ADLs/IADLs and return to sport/leisure activities  Patient goal: return PLOF         Objective     Postural Observations  Seated posture: fair  Standing posture: fair  Correction of posture: has no consistent effect        Palpation   Left   No palpable tenderness to the anterior deltoid and biceps  Hypertonic in the infraspinatus, latissimus, levator scapulae, middle trapezius, pectoralis major, pectoralis minor, subclavius, supraspinatus, teres major, teres minor and upper trapezius  Tenderness of the pectoralis major and pectoralis minor  Tenderness     Left Shoulder   No tenderness     Cervical/Thoracic Screen   Cervical range of motion within normal limits with the following exceptions: Limited rotation B and extension by 25%    Neurological Testing     Sensation     Shoulder   Left Shoulder   Intact: light touch    Right Shoulder   Intact: light touch    Active Range of Motion   Left Shoulder   Flexion: 140 degrees with pain  Abduction: 130 degrees with pain  External rotation 90°: 70 degrees with pain  External rotation BTH: T2   Internal rotation 90°: 70 degrees with pain  Internal rotation BTB: L1     Right Shoulder   Normal active range of motion  External rotation 90°: 90 degrees  External rotation BTH: T2   Internal rotation 90°: 70 degrees     Joint Play   Left Shoulder  Hypomobile in the anterior capsule, posterior capsule, inferior capsule, AC joint and cervical spine  Strength/Myotome Testing     Left Shoulder     Planes of Motion   Flexion: 4+   Extension: 4+   Abduction: 4   Adduction: 4   External rotation at 0°: 4- (pain)   External rotation at 90°: 4-   Internal rotation at 90°: 4- (pain)     Right Shoulder   Normal muscle strength    Tests     Left Shoulder   Positive painful arc  Negative belly press, empty can, full can and Hawkin's                Precautions: standard      Date     8/11/22   Visit Number     1 (IE)   Manuals                                        Neuro Re-Ed                                                                 Ther Ex        Pulley     10x10"   Row     10x BTB   Door Stretch     3x30"   B  ER     10x3" Ther Activity                        Gait Training                        Modalities

## 2022-08-18 ENCOUNTER — OFFICE VISIT (OUTPATIENT)
Dept: PHYSICAL THERAPY | Facility: CLINIC | Age: 62
End: 2022-08-18
Payer: COMMERCIAL

## 2022-08-18 DIAGNOSIS — S46.912D LEFT SHOULDER STRAIN, SUBSEQUENT ENCOUNTER: Primary | ICD-10-CM

## 2022-08-18 PROCEDURE — 97110 THERAPEUTIC EXERCISES: CPT

## 2022-08-18 PROCEDURE — 97140 MANUAL THERAPY 1/> REGIONS: CPT

## 2022-08-18 NOTE — PROGRESS NOTES
Daily Note     Today's date: 2022  Patient name: Delon Valencia  : 1960  MRN: [id number]  Referring provider: Francisca Jones  Dx:   Encounter Diagnosis   Name Primary?  Left shoulder strain, subsequent encounter Yes                  Subjective: Pt reports shoulder is starting to feel better since starting exercises  Currently raed 0/10  Objective: See treatment diary below      Assessment: Pt tolerated treatment well with no complaints of pain  Present with TTP at left pec minor and pec major insertion  Able to release and get subsequent increased flexion ROM  Reviewed initial HEP with patient and demonstrates independence  Plan: Continue with plan of care to decrease pain, improve mobility, strength, and function            Precautions: standard      Date 22      Visit Number 1 (IE) 2      Manuals        STM  pec release      Mobilization   GHJ distraction                      Neuro Re-Ed         Supine alphabet  1 lb x2      Row 10x BTB Hold 5, 2x10 blue                                              Ther Ex        Pulley 10x10" Hold 5, 2x10       Door Stretch 3x30" Hold 10, 1x10       B SH ER 10x3"       Cane ER  Hold 5, 2x10       Cane flex  Hold 5, 2x10       Hor abd  Yellow Hold 5, 2x10       TS rotation  Right Hold 5, 2x10                                                       Modalities

## 2022-08-30 ENCOUNTER — OFFICE VISIT (OUTPATIENT)
Dept: PHYSICAL THERAPY | Facility: CLINIC | Age: 62
End: 2022-08-30
Payer: COMMERCIAL

## 2022-08-30 DIAGNOSIS — S46.912D LEFT SHOULDER STRAIN, SUBSEQUENT ENCOUNTER: Primary | ICD-10-CM

## 2022-08-30 PROCEDURE — 97112 NEUROMUSCULAR REEDUCATION: CPT

## 2022-08-30 PROCEDURE — 97110 THERAPEUTIC EXERCISES: CPT

## 2022-08-30 NOTE — PROGRESS NOTES
Daily Note     Today's date: 2022  Patient name: Efren Quintana  : 1960  MRN: [id number]  Referring provider: Celso Ware  Dx:   Encounter Diagnosis   Name Primary?  Left shoulder strain, subsequent encounter Yes                  Subjective: Pt reports his shoulder continues to improve  Pain presents at certain ranges of motion to 3/10  Objective: See treatment diary below      Assessment: Pt tolerated treatment well with no complaints of pain  Pt with decreased pain in shoulder with abduction and flexion when cueing to minimize scap hiking  Pt was given updated HEP today and demonstrates understanding  [employer] Code: [email or web address]  Plan: Continue with plan of care to decrease pain, improve mobility, strength, and function            Precautions: standard  [employer] Code: [id number]    Date 22     Visit Number 1 (IE) 2 3     Manuals        STM  pec release      Mobilization   GHJ distraction GHJ distraction                     Neuro Re-Ed         Supine alphabet  1 lb x2 1 lb x2     Row 10x BTB Hold 5, 2x10 blue Hold 5, 2x10 blue     Wall slides   Hold 5, 2x10      Wall vs ball   4 way x20 ea                             Ther Ex        Pulley 10x10" Hold 5, 2x10  2 way 2x10 ea     Door Stretch 3x30" Hold 10, 1x10  Hold 10, 1x10     B SH ER 10x3"       Cane ER  Hold 5, 2x10       Cane flex  Hold 5, 2x10  Hold 5, 2x10      Hor abd  Yellow Hold 5, 2x10  Red Hold 5, 2x10      TS rotation  Right Hold 5, 2x10                                                       Modalities

## 2022-09-01 ENCOUNTER — OFFICE VISIT (OUTPATIENT)
Dept: PHYSICAL THERAPY | Facility: CLINIC | Age: 62
End: 2022-09-01
Payer: COMMERCIAL

## 2022-09-01 DIAGNOSIS — S46.912D LEFT SHOULDER STRAIN, SUBSEQUENT ENCOUNTER: Primary | ICD-10-CM

## 2022-09-01 DIAGNOSIS — S46.912A LEFT SHOULDER STRAIN, INITIAL ENCOUNTER: ICD-10-CM

## 2022-09-01 PROCEDURE — 97112 NEUROMUSCULAR REEDUCATION: CPT

## 2022-09-01 PROCEDURE — 97110 THERAPEUTIC EXERCISES: CPT

## 2022-09-01 NOTE — PROGRESS NOTES
Daily Note     Today's date: 2022  Patient name: Navi Camp  : 1960  MRN: [id number]  Referring provider: Yajaira Maya  Dx:   Encounter Diagnoses   Name Primary?  Left shoulder strain, subsequent encounter Yes    Left shoulder strain, initial encounter                   Subjective: Pt reports continued improvement in shoulder  No pain since last session  Objective: See treatment diary below      Assessment: Pt denies any pain with treatment and tolerated well  Demonstrates improve scap awareness with elevation and able to minimize compensations  Fatigued at end session  Plan: Continue with plan of care to decrease pain, improve mobility, strength, and function            Precautions: standard  [employer] Code: [id number]    Date 22    Visit Number 1 (IE) 2 3 4    Manuals        STM  pec release      Mobilization   GHJ distraction GHJ distraction                     Neuro Re-Ed         Supine alphabet  1 lb x2 1 lb x2 1 lb x2    Row 10x BTB Hold 5, 2x10 blue Hold 5, 2x10 blue     Wall slides   Hold 5, 2x10  Hold 5, 2x10     Wall vs ball   4 way x20 ea 4 way x20 ea    Shoulder ext in abd    agus 2 0 2x10                    Ther Ex        Pulley 10x10" Hold 5, 2x10  2 way 2x10 ea 2 way 2x10 ea    Door Stretch 3x30" Hold 10, 1x10  Hold 10, 1x10     B SH ER 10x3"       Cane ER  Hold 5, 2x10       Cane flex  Hold 5, 2x10  Hold 5, 2x10  Hold 5, 2x10     Hor abd  Yellow Hold 5, 2x10  Red Hold 5, 2x10  Red Hold 5, 2x10     TS rotation  Right Hold 5, 2x10   Right Hold 5, 2x10                                                    Modalities

## 2022-09-06 ENCOUNTER — OFFICE VISIT (OUTPATIENT)
Dept: OBGYN CLINIC | Facility: CLINIC | Age: 62
End: 2022-09-06
Payer: COMMERCIAL

## 2022-09-06 VITALS
DIASTOLIC BLOOD PRESSURE: 78 MMHG | SYSTOLIC BLOOD PRESSURE: 122 MMHG | WEIGHT: 210 LBS | HEART RATE: 68 BPM | HEIGHT: 72 IN | BODY MASS INDEX: 28.44 KG/M2

## 2022-09-06 DIAGNOSIS — S46.912D LEFT SHOULDER STRAIN, SUBSEQUENT ENCOUNTER: Primary | ICD-10-CM

## 2022-09-06 PROCEDURE — 99213 OFFICE O/P EST LOW 20 MIN: CPT | Performed by: ORTHOPAEDIC SURGERY

## 2022-09-06 RX ORDER — LEVOTHYROXINE SODIUM 137 UG/1
TABLET ORAL
COMMUNITY
Start: 2022-09-04

## 2022-09-06 NOTE — PROGRESS NOTES
Assessment/Plan:  1  Left shoulder strain, subsequent encounter       Scribe Attestation    I,:  Liborio Crowell am acting as a scribe while in the presence of the attending physician :       I,:  Gerhard Fleming MD personally performed the services described in this documentation    as scribed in my presence :         Mohini Murillo upon exam today demonstrates improvements signs and symptoms of his left shoulder strain  I reviewed his recent physical therapy notes which report improvements in strength and range of motion  He may continue his daily activities as tolerated  He may continue to take ice and OTC medications as needed for discomfort  He may follow-up on as-needed basis  Subjective:   Jo Ferrell is a 59 y o  male who presents follow up evaluation of the left shoulder  He reports significant improvements since his prior visit  His pain is decreased and he feels his shoulder is back to where it was prior to his umbrella incident  He reports mild discomfort when lowering the arm from a raised position otherwise denies pain in the shoulder  He was compliant with PT  Review of Systems   Constitutional: Negative for chills and fever  HENT: Negative for ear pain and sore throat  Eyes: Negative for pain and visual disturbance  Respiratory: Negative for cough and shortness of breath  Cardiovascular: Negative for chest pain and palpitations  Gastrointestinal: Negative for abdominal pain and vomiting  Genitourinary: Negative for dysuria and hematuria  Musculoskeletal: Negative for arthralgias and back pain  Skin: Negative for color change and rash  Neurological: Negative for seizures and syncope  All other systems reviewed and are negative          Past Medical History:   Diagnosis Date    CPAP (continuous positive airway pressure) dependence     Diabetes mellitus ([address] )     Disease of thyroid gland     hypo    Hypercholesterolemia     Migraines     improved since on verapamil  Pre-diabetes     Sleep apnea        Past Surgical History:   Procedure Laterality Date    CHOLECYSTECTOMY      CYST REMOVAL      on chest    EYE SURGERY      FINGER SURGERY Left     index of left hand    HAND SURGERY      [place] SHLDR ARTHROSCOP,SURG,W/ROTAT CUFF REPR Left 03/10/2020    Procedure: REPAIR ROTATOR CUFF  ARTHROSCOPIC WITH SUBACROMIAL DECOMPRESSION, BICEPS TENODESIS;  Surgeon: Alfred Hogan MD;  Location: [facility];  Service: Orthopedics    SHOULDER SURGERY         Family History   Problem Relation Age of Onset    Diabetes Mother     Cancer Mother     Diabetes Father     Cancer Father     No Known Problems Sister     No Known Problems Brother     No Known Problems Maternal Aunt     No Known Problems Maternal Uncle     No Known Problems Paternal Aunt     No Known Problems Paternal Uncle     No Known Problems Maternal Grandmother     No Known Problems Maternal Grandfather     No Known Problems Paternal Grandmother     No Known Problems Paternal Grandfather        Social History     Occupational History    Not on file   Tobacco Use    Smoking status: Never Smoker    Smokeless tobacco: Never Used   Vaping Use    Vaping Use: Never used   Substance and Sexual Activity    Alcohol use: Never    Drug use: Never    Sexual activity: Not on file         Current Outpatient Medications:     levothyroxine 137 mcg tablet, , Disp: , Rfl:     metFORMIN (GLUCOPHAGE) 500 mg tablet, 500 mg 2 (two) times a day , Disp: , Rfl:     rosuvastatin (CRESTOR) 10 MG tablet, Take 10 mg by mouth daily, Disp: , Rfl:     sertraline (ZOLOFT) 50 mg tablet, Take 50 mg by mouth daily, Disp: , Rfl:     verapamil (CALAN-SR) 180 mg CR tablet, , Disp: , Rfl:     No Known Allergies    Objective:  Vitals:    09/06/22 0841   BP: 122/78   Pulse: 68       Left Shoulder Exam     Range of Motion   Active abduction: 140   External rotation: 70   Internal rotation 0 degrees: T12     Muscle Strength   Abduction: 5/5 Internal rotation: 5/5   External rotation: 5/5   Subscapularis: 5/5     Tests   Impingement: positive    Other   Erythema: absent  Sensation: normal  Pulse: present     Comments:  Minor impingement  Speeds: positive            Physical Exam  HENT:      Head: Normocephalic and atraumatic  Eyes:      General:         Right eye: No discharge  Left eye: No discharge  Conjunctiva/sclera: Conjunctivae normal       Pupils: Pupils are equal, round, and reactive to light  Cardiovascular:      Rate and Rhythm: Normal rate  Pulmonary:      Effort: Pulmonary effort is normal  No respiratory distress  Musculoskeletal:         General: Normal range of motion  Cervical back: Normal range of motion and neck supple  Comments: As noted in HPI   Skin:     General: Skin is warm and dry  Neurological:      Mental Status: He is alert and oriented to person, place, and time  I have personally reviewed pertinent films in PACS and my interpretation is as follows: No imaging to review  This document was created using speech voice recognition software  Grammatical errors, random word insertions, pronoun errors, and incomplete sentences are an occasional consequence of this system due to software limitations, ambient noise, and hardware issues  Any formal questions or concerns about content, text, or information contained within the body of this dictation should be directly addressed to the provider for clarification

## 2023-05-20 ENCOUNTER — APPOINTMENT (OUTPATIENT)
Dept: RADIOLOGY | Facility: CLINIC | Age: 63
End: 2023-05-20

## 2023-05-20 ENCOUNTER — OFFICE VISIT (OUTPATIENT)
Dept: URGENT CARE | Facility: CLINIC | Age: 63
End: 2023-05-20

## 2023-05-20 VITALS
BODY MASS INDEX: 28.35 KG/M2 | TEMPERATURE: 97.5 F | WEIGHT: 209 LBS | RESPIRATION RATE: 14 BRPM | OXYGEN SATURATION: 99 % | HEART RATE: 62 BPM

## 2023-05-20 DIAGNOSIS — R52 PAIN: ICD-10-CM

## 2023-05-20 DIAGNOSIS — S60.221A CONTUSION OF RIGHT HAND, INITIAL ENCOUNTER: Primary | ICD-10-CM

## 2023-05-20 RX ORDER — SERTRALINE HYDROCHLORIDE 100 MG/1
TABLET, FILM COATED ORAL
COMMUNITY
Start: 2023-05-17

## 2023-05-20 RX ORDER — LEVOTHYROXINE SODIUM 137 UG/1
TABLET ORAL
COMMUNITY
Start: 2022-05-01

## 2023-05-20 NOTE — PROGRESS NOTES
[facility]        NAME: Lorna Golden is a 57 y o  male  : 1960    MRN: [id number]  DATE: May 20, 2023  TIME: 10:42 AM    Assessment and Plan   Contusion of right hand, initial encounter [S60 221A]  1  Contusion of right hand, initial encounter  XR hand 3+ vw right        XR with arthritis but no acute osseous abnormality per my preliminary read  Recommend RICE, tylenol prn  Discussed strict return to care precautions as well as red flag symptoms which should prompt immediate ED referral  Pt verbalized understanding and is in agreement with plan  Please follow up with your primary care provider within the next week  Please remember that your visit today was with an urgent care provider and should not replace follow up with your primary care provider for chronic medical issues or annual physicals  Patient Instructions       Follow up with PCP in 3-5 days  Proceed to  ER if symptoms worsen  Chief Complaint     Chief Complaint   Patient presents with   • Pain     Pt presents with injury of the right hand r/t a tile machine tool came down onto hand last night         History of Present Illness       Patient is a 78-year-old male presenting with right hand pain since last night  His  started up before he was ready and the lever swung over and hit his hand near dorsal second MCP  Took Tylenol last night with some relief, does feel better today but wants to make sure nothing is broken  No numbness or tingling  Review of Systems   Review of Systems   Constitutional: Negative for chills and fever  Respiratory: Negative for shortness of breath  Cardiovascular: Negative for chest pain  Gastrointestinal: Negative for nausea and vomiting  Musculoskeletal: Positive for joint swelling and myalgias  Negative for arthralgias, back pain, gait problem and neck pain  Skin: Negative for color change and wound  Neurological: Negative for weakness and numbness           Current Medications       Current Outpatient Medications:   •  levothyroxine 137 mcg tablet, , Disp: , Rfl:   •  metFORMIN (GLUCOPHAGE) 500 mg tablet, 500 mg 2 (two) times a day , Disp: , Rfl:   •  rosuvastatin (CRESTOR) 10 MG tablet, Take 10 mg by mouth daily, Disp: , Rfl:   •  sertraline (ZOLOFT) 100 mg tablet, , Disp: , Rfl:   •  sitaGLIPtin (JANUVIA) 25 mg tablet, Januvia 25 mg tablet  TAKE 1 TABLET BY MOUTH EVERY DAY, Disp: , Rfl:   •  verapamil (CALAN-SR) 180 mg CR tablet, , Disp: , Rfl:     Current Allergies     Allergies as of 05/20/2023   • (No Known Allergies)            The following portions of the patient's history were reviewed and updated as appropriate: allergies, current medications, past family history, past medical history, past social history, past surgical history and problem list      Past Medical History:   Diagnosis Date   • CPAP (continuous positive airway pressure) dependence    • Diabetes mellitus ([facility] [address] )    • Disease of thyroid gland     hypo   • Hypercholesterolemia    • Migraines     improved since on verapamil   • Pre-diabetes    • Sleep apnea        Past Surgical History:   Procedure Laterality Date   • CHOLECYSTECTOMY     • CYST REMOVAL      on chest   • EYE SURGERY     • FINGER SURGERY Left     index of left hand   • HAND SURGERY     • UT SURGICAL ARTHROSCOPY SHOULDER W/ROTATOR CUFF RPR Left 03/10/2020    Procedure: REPAIR ROTATOR CUFF  ARTHROSCOPIC WITH SUBACROMIAL DECOMPRESSION, BICEPS TENODESIS;  Surgeon: Gunnar Beckham MD;  Location: [address] [place] [address];  Service: Orthopedics   • SHOULDER SURGERY         Family History   Problem Relation Age of Onset   • Diabetes Mother    • Cancer Mother    • Diabetes Father    • Cancer Father    • No Known Problems Sister    • No Known Problems Brother    • No Known Problems Maternal Aunt    • No Known Problems Maternal Uncle    • No Known Problems Paternal Aunt    • No Known Problems Paternal Uncle    • No Known Problems Maternal Grandmother    • No Known Problems Maternal Grandfather    • No Known Problems Paternal Grandmother    • No Known Problems Paternal Grandfather          Medications have been verified  Objective   Pulse 62   Temp 97 5 °F (36 4 °C)   Resp 14   Wt 94 8 kg (209 lb)   SpO2 99%   BMI 28 35 kg/m²        Physical Exam     Physical Exam  Vitals and nursing note reviewed  Constitutional:       General: He is not in acute distress  Appearance: Normal appearance  He is not ill-appearing  HENT:      Head: Normocephalic and atraumatic  Cardiovascular:      Rate and Rhythm: Normal rate  Pulmonary:      Effort: Pulmonary effort is normal  No respiratory distress  Musculoskeletal:      Right hand: Swelling (swelling and bruising noted over 2nd MCP) and tenderness present  No deformity  Decreased range of motion (at baseline)  Normal strength  Normal sensation  Normal pulse  Skin:     General: Skin is warm and dry  Capillary Refill: Capillary refill takes less than 2 seconds  Neurological:      Mental Status: He is alert and oriented to person, place, and time     Psychiatric:         Behavior: Behavior normal

## 2023-06-15 ENCOUNTER — TELEPHONE (OUTPATIENT)
Dept: OBGYN CLINIC | Facility: HOSPITAL | Age: 63
End: 2023-06-15

## 2023-06-15 NOTE — TELEPHONE ENCOUNTER
Caller: Patient    Doctor: MILTON    Reason for call: Patient calling for Dr Livan Garcia  Informed pt he is not with the network  Pt declined appt

## 2025-02-20 ENCOUNTER — OFFICE VISIT (OUTPATIENT)
Dept: PHYSICAL THERAPY | Facility: CLINIC | Age: 65
End: 2025-02-20
Payer: COMMERCIAL

## 2025-02-20 DIAGNOSIS — M25.511 ACUTE PAIN OF RIGHT SHOULDER: Primary | ICD-10-CM

## 2025-02-20 DIAGNOSIS — Z98.890 S/P RIGHT ROTATOR CUFF REPAIR: ICD-10-CM

## 2025-02-20 PROCEDURE — 97161 PT EVAL LOW COMPLEX 20 MIN: CPT

## 2025-02-20 PROCEDURE — 97110 THERAPEUTIC EXERCISES: CPT

## 2025-02-20 NOTE — LETTER
2025    Rio Coffey MD  [address] [place] [id number]    Patient: Efren Connell   YOB: 1960   Date of Visit: 2025     Encounter Diagnosis     ICD-10-CM    1. Acute pain of right shoulder  M25.511       2. S/P right rotator cuff repair  Z98.890           Dear Dr. Coffey:    Thank you for your recent referral of Efren Connell. Please review the attached evaluation summary from Efren's recent visit.     Please verify that you agree with the plan of care by signing the attached order.     If you have any questions or concerns, please do not hesitate to call.     I sincerely appreciate the opportunity to share in the care of one of your patients and hope to have another opportunity to work with you in the near future.       Sincerely,    Luis Armando Whittaker, PT      Referring Provider:      I certify that I have read the below Plan of Care and certify the need for these services furnished under this plan of treatment while under my care.                    Rio Coffey MD  [address] [place] [id number]  Via Fax: [phone]          PT Evaluation     Today's date: 2025  Patient name: Efren Connell  : 1960  MRN: [id number]  Referring provider: Rio Coffey*  Dx:   Encounter Diagnosis     ICD-10-CM    1. Acute pain of right shoulder  M25.511       2. S/P right rotator cuff repair  Z98.890           Start Time: 1021  Stop Time: 1100  Total time in clinic (min): 39 minutes    Assessment  Impairments: abnormal coordination, abnormal muscle tone, abnormal or restricted ROM, abnormal movement, activity intolerance, impaired physical strength, lacks appropriate home exercise program, pain with function, poor posture , poor body mechanics, unable to perform ADL, participation limitations, activity limitations and endurance  Symptom irritability: moderate    Assessment details: Pt is a 64 y.o male  who presents to skilled physical therapy almost 2 weeks s/p R RTC repair and shoulder debridement, performed on 2/6/25. Pt arrives utilizing his sling while demonstrating poor-fair posture, decreased R shoulder A/PROM, decreased R shoulder MMT, and TTP along his anterior R shoulder. Pt's hx and clinical findings are consistent with the referring diagnosis. Pt's pain and deficits are preventing him from performing self care, ADLs, recreational activities, and work duties without compensations. Pt was educated on his diagnosis, prognosis, POC, HEP, and surgical precautions/contraindications. Pt would benefit from skilled physical therapy to reduce his pain, address his deficits, progress towards his goals, work through his post-surgical protocol, and return to his PLOF.   Understanding of Dx/Px/POC: good     Prognosis: good    Goals  Short Term Goals:  1) Pt will initiate and progress his HEP in 3-4 weeks.  2) Pt will improve his sitting and standing posture to good in 3-4 weeks.  3) Pt will improve his PROM shoulder flexion to at least 90 degrees to improve self care in 3-4 weeks.  4) Pt will initiate AROM shoulder exercises in 3-4 weeks to improve his ADLs.    Long Term Goals:  1) Pt will be able to sleep for 6-8 hours without shoulder pain in 6-8 weeks.  2) Pt will be able to get dressed with less than 2/10 pain in 6-8 weeks.  3) Pt will be able to put on a coat with less than 2/10 apin in 6-8 weeks.  4) Pt will be able to reach a shelf at or above shoulder height with less than 3/10 pain in 8-12 weeks.  5) Pt will be able to perform his ADLs with less than 3/10 pain in 8-12 weeks.    Plan  Patient would benefit from: skilled physical therapy and PT eval  Planned modality interventions: cryotherapy and thermotherapy: hydrocollator packs    Planned therapy interventions: activity modification, joint mobilization, ADL retraining, manual therapy, body mechanics training, motor coordination training, neuromuscular  re-education, patient/caregiver education, coordination, postural training, strengthening, stretching, therapeutic activities, flexibility, functional ROM exercises, graded exercise and home exercise program    Frequency: 2-3x week  Duration in weeks: 8  Plan of Care beginning date: 2025  Plan of Care expiration date: 2025  Treatment plan discussed with: patient        Subjective Evaluation    History of Present Illness  Mechanism of injury: Pt is a , sits at a desk mostly. Pt had a R RTC repair on 25, his tear was degenerative in nature. Pt states the hardest part is when he is trying to sleep at night in his recliner. Pt had a previous L RTC repair 5 years ago and did fine. Pt also had arthrosis where he underwent debridement. Pt took they oxy for only a few days but then stopped. Pt is able to button his pants, is able to shower without issue. Pt denies any numbness or tingling in his RUE. Pt saw his swelling decrease after the first day and hasn't noticed it too much.   Patient Goals  Patient goals for therapy: decreased pain, increased motion, increased strength, return to sport/leisure activities, independence with ADLs/IADLs and return to work  Patient goal: lift overhead  Pain  Current pain ratin  At best pain ratin  At worst pain ratin  Location: superior aspect of his R shoulder, distal biceps  Quality: tight and throbbing  Relieving factors: medications  Aggravating factors: stair climbing, lifting and overhead activity  Progression: improved    Social Support  Steps to enter house: yes  Stairs in house: yes   Lives in: multiple-level home  Lives with: spouse    Employment status: working  Hand dominance: right  Exercise history: malik          Objective     Postural Observations  Seated posture: poor  Standing posture: fair  Correction of posture: has no consistent effect      Tenderness     Right Shoulder  Tenderness in the AC joint, acromion,  biceps tendon (proximal) and supraspinatus tendon.     Active Range of Motion     Right Elbow   Flexion: 135 degrees with pain  Extension: 18 degrees     Additional Active Range of Motion Details  NT Today    Passive Range of Motion     Right Shoulder   Flexion: 33 degrees with pain  Abduction: 15 degrees with pain  External rotation 0°: 3 degrees with pain  Internal rotation 0°: 40 degrees with pain    Strength/Myotome Testing     Additional Strength Details  NT Today                Insurance:  AMA/CMS Eval/ Re-eval Auth #/ Referral # Total units or visits Start date  Expiration date KX? Visit limitation?  PT only or  PT+OT? Co-Insurance   AMA 2/20/25 NO AUTH  2/20/25 12/31/25  90 PCY  $0                 POC Start Date POC Expiration Date Signed POC?   2/20/25 4/17/25 pending      Date               Visits/Units: NO AUTH Used               Authed: NO AUTH Remaining                   Precautions: RTC Repair Protocol  Access Code: [email or web address]  URL: [email or web address]  Date: 02/20/2025  Prepared by: Luis Armando Whittaker    Exercises  - Seated Scapular Retraction  - 3-4 x daily - 7 x weekly - 3 sets - 10 reps  - Seated Shoulder Flexion Towel Slide at Table Top  - 2-3 x daily - 7 x weekly - 2-3 sets - 10 reps - 3 hold  - Seated Elbow Flexion and Extension AROM  - 2-3 x daily - 7 x weekly - 3 sets - 10 reps  - Flexion-Extension Shoulder Pendulum with Table Support  - 2-3 x daily - 7 x weekly - 3 sets - 10 reps  - Supine Shoulder External Rotation with Dowel  - 2-3 x daily - 7 x weekly - 2-3 sets - 10 reps - 3 hold    Date     2/20/25   Visit Number     1 (IE)   Manuals                                        Neuro Re-Ed                                                                 Ther Ex                                                                        Ther Activity                        Gait Training                        Modalities

## 2025-02-20 NOTE — PROGRESS NOTES
PT Evaluation     Today's date: 2025  Patient name: Efren Connell  : 1960  MRN: [id number]  Referring provider: Rio Coffey*  Dx:   Encounter Diagnosis     ICD-10-CM    1. Acute pain of right shoulder  M25.511       2. S/P right rotator cuff repair  Z98.890           Start Time: 1021  Stop Time: 1100  Total time in clinic (min): 39 minutes    Assessment  Impairments: abnormal coordination, abnormal muscle tone, abnormal or restricted ROM, abnormal movement, activity intolerance, impaired physical strength, lacks appropriate home exercise program, pain with function, poor posture , poor body mechanics, unable to perform ADL, participation limitations, activity limitations and endurance  Symptom irritability: moderate    Assessment details: Pt is a 64 y.o male who presents to skilled physical therapy almost 2 weeks s/p R RTC repair and shoulder debridement, performed on 25. Pt arrives utilizing his sling while demonstrating poor-fair posture, decreased R shoulder A/PROM, decreased R shoulder MMT, and TTP along his anterior R shoulder. Pt's hx and clinical findings are consistent with the referring diagnosis. Pt's pain and deficits are preventing him from performing self care, ADLs, recreational activities, and work duties without compensations. Pt was educated on his diagnosis, prognosis, POC, HEP, and surgical precautions/contraindications. Pt would benefit from skilled physical therapy to reduce his pain, address his deficits, progress towards his goals, work through his post-surgical protocol, and return to his PLOF.   Understanding of Dx/Px/POC: good     Prognosis: good    Goals  Short Term Goals:  1) Pt will initiate and progress his HEP in 3-4 weeks.  2) Pt will improve his sitting and standing posture to good in 3-4 weeks.  3) Pt will improve his PROM shoulder flexion to at least 90 degrees to improve self care in 3-4 weeks.  4) Pt will initiate AROM shoulder exercises in 3-4  weeks to improve his ADLs.    Long Term Goals:  1) Pt will be able to sleep for 6-8 hours without shoulder pain in 6-8 weeks.  2) Pt will be able to get dressed with less than 2/10 pain in 6-8 weeks.  3) Pt will be able to put on a coat with less than 2/10 apin in 6-8 weeks.  4) Pt will be able to reach a shelf at or above shoulder height with less than 3/10 pain in 8-12 weeks.  5) Pt will be able to perform his ADLs with less than 3/10 pain in 8-12 weeks.    Plan  Patient would benefit from: skilled physical therapy and PT eval  Planned modality interventions: cryotherapy and thermotherapy: hydrocollator packs    Planned therapy interventions: activity modification, joint mobilization, ADL retraining, manual therapy, body mechanics training, motor coordination training, neuromuscular re-education, patient/caregiver education, coordination, postural training, strengthening, stretching, therapeutic activities, flexibility, functional ROM exercises, graded exercise and home exercise program    Frequency: 2-3x week  Duration in weeks: 8  Plan of Care beginning date: 2/20/2025  Plan of Care expiration date: 4/17/2025  Treatment plan discussed with: patient        Subjective Evaluation    History of Present Illness  Mechanism of injury: Pt is a , sits at a desk mostly. Pt had a R RTC repair on 2/6/25, his tear was degenerative in nature. Pt states the hardest part is when he is trying to sleep at night in his recliner. Pt had a previous L RTC repair 5 years ago and did fine. Pt also had arthrosis where he underwent debridement. Pt took they oxy for only a few days but then stopped. Pt is able to button his pants, is able to shower without issue. Pt denies any numbness or tingling in his RUE. Pt saw his swelling decrease after the first day and hasn't noticed it too much.   Patient Goals  Patient goals for therapy: decreased pain, increased motion, increased strength, return to sport/leisure  activities, independence with ADLs/IADLs and return to work  Patient goal: lift overhead  Pain  Current pain ratin  At best pain ratin  At worst pain ratin  Location: superior aspect of his R shoulder, distal biceps  Quality: tight and throbbing  Relieving factors: medications  Aggravating factors: stair climbing, lifting and overhead activity  Progression: improved    Social Support  Steps to enter house: yes  Stairs in house: yes   Lives in: multiple-level home  Lives with: spouse    Employment status: working  Hand dominance: right  Exercise history: pendulums          Objective     Postural Observations  Seated posture: poor  Standing posture: fair  Correction of posture: has no consistent effect      Tenderness     Right Shoulder  Tenderness in the AC joint, acromion, biceps tendon (proximal) and supraspinatus tendon.     Active Range of Motion     Right Elbow   Flexion: 135 degrees with pain  Extension: 18 degrees     Additional Active Range of Motion Details  NT Today    Passive Range of Motion     Right Shoulder   Flexion: 33 degrees with pain  Abduction: 15 degrees with pain  External rotation 0°: 3 degrees with pain  Internal rotation 0°: 40 degrees with pain    Strength/Myotome Testing     Additional Strength Details  NT Today                Insurance:  A/CMS Eval/ Re-eval Auth #/ Referral # Total units or visits Start date  Expiration date KX? Visit limitation?  PT only or  PT+OT? Co-Insurance   AMA 25 NO AUTH  25  90 PCY  $0                 POC Start Date POC Expiration Date Signed POC?   25 pending      Date               Visits/Units: NO AUTH Used               Authed: NO AUTH Remaining                   Precautions: RTC Repair Protocol  Access Code: [email or web address]  URL: [email or web address]  Date: 2025  Prepared by: Luis Armando Whittaker    Exercises  - Seated Scapular Retraction  - 3-4 x daily - 7 x weekly - 3 sets - 10 reps  - Seated Shoulder  Flexion Towel Slide at Table Top  - 2-3 x daily - 7 x weekly - 2-3 sets - 10 reps - 3 hold  - Seated Elbow Flexion and Extension AROM  - 2-3 x daily - 7 x weekly - 3 sets - 10 reps  - Flexion-Extension Shoulder Pendulum with Table Support  - 2-3 x daily - 7 x weekly - 3 sets - 10 reps  - Supine Shoulder External Rotation with Dowel  - 2-3 x daily - 7 x weekly - 2-3 sets - 10 reps - 3 hold    Date     2/20/25   Visit Number     1 (IE)   Manuals                                        Neuro Re-Ed                                                                 Ther Ex                                                                        Ther Activity                        Gait Training                        Modalities

## 2025-02-24 ENCOUNTER — OFFICE VISIT (OUTPATIENT)
Dept: PHYSICAL THERAPY | Facility: CLINIC | Age: 65
End: 2025-02-24
Payer: COMMERCIAL

## 2025-02-24 DIAGNOSIS — M25.511 ACUTE PAIN OF RIGHT SHOULDER: Primary | ICD-10-CM

## 2025-02-24 DIAGNOSIS — Z98.890 S/P RIGHT ROTATOR CUFF REPAIR: ICD-10-CM

## 2025-02-24 PROCEDURE — 97110 THERAPEUTIC EXERCISES: CPT

## 2025-02-24 PROCEDURE — 97140 MANUAL THERAPY 1/> REGIONS: CPT

## 2025-02-24 NOTE — PROGRESS NOTES
Daily Note     Today's date: 2025  Patient name: Efren Connell  : 1960  MRN: [id number]  Referring provider: Rio Coffey*  Dx:   Encounter Diagnosis     ICD-10-CM    1. Acute pain of right shoulder  M25.511       2. S/P right rotator cuff repair  Z98.890           Start Time: 1534  Stop Time: 1610  Total time in clinic (min): 36 minutes    Subjective: Pt reports that his shoulder feels about the same, slightly better from his previous session. Pt feels like he has full ROM in his elbow, states compliance with his HEP.       Objective: See treatment diary below      Assessment: Tolerated treatment well. Patient demonstrated fatigue post treatment, exhibited good technique with therapeutic exercises, and would benefit from continued PT. Continued gentle PROM of his R shoulder within his surgical protocols.       Plan: Continue per plan of care.  Progress treatment as tolerated.          Insurance:  AMA/CMS Eval/ Re-eval Auth #/ Referral # Total units or visits Start date  Expiration date KX? Visit limitation?  PT only or  PT+OT? Co-Insurance   AMA 25 NO AUTH  25  90 PCY  $0                 POC Start Date POC Expiration Date Signed POC?   25 pending      Date               Visits/Units: NO AUTH Used               Authed: NO AUTH Remaining                   Precautions: RTC Repair Protocol  Access Code: [email or web address]  URL: [email or web address]  Date: 2025  Prepared by: Luis Armando Whittaker    Exercises  - Seated Scapular Retraction  - 3-4 x daily - 7 x weekly - 3 sets - 10 reps  - Seated Shoulder Flexion Towel Slide at Table Top  - 2-3 x daily - 7 x weekly - 2-3 sets - 10 reps - 3 hold  - Seated Elbow Flexion and Extension AROM  - 2-3 x daily - 7 x weekly - 3 sets - 10 reps  - Flexion-Extension Shoulder Pendulum with Table Support  - 2-3 x daily - 7 x weekly - 3 sets - 10 reps  - Supine Shoulder External Rotation with Dowel  - 2-3 x daily - 7 x weekly  - 2-3 sets - 10 reps - 3 hold    Date    2/24 2/20/25   Visit Number    2 1 (IE)   Manuals        Shoulder PROM    BT performed flex, abd, ER                            Neuro Re-Ed         Scap retr    2x10 supine    RTC isos                                                Ther Ex        pendulums    Circles 30x cw/ccw    Table slides    2x10    Supine cane ER PROM    2x10 gentle    Elbow AROM    5x full ROM                                    Ther Activity                        Gait Training                        Modalities

## 2025-02-26 ENCOUNTER — OFFICE VISIT (OUTPATIENT)
Dept: PHYSICAL THERAPY | Facility: CLINIC | Age: 65
End: 2025-02-26
Payer: COMMERCIAL

## 2025-02-26 DIAGNOSIS — M25.511 ACUTE PAIN OF RIGHT SHOULDER: Primary | ICD-10-CM

## 2025-02-26 DIAGNOSIS — Z98.890 S/P RIGHT ROTATOR CUFF REPAIR: ICD-10-CM

## 2025-02-26 PROCEDURE — 97110 THERAPEUTIC EXERCISES: CPT

## 2025-02-26 PROCEDURE — 97140 MANUAL THERAPY 1/> REGIONS: CPT

## 2025-02-26 NOTE — PROGRESS NOTES
Daily Note     Today's date: 2025  Patient name: Efren Connell  : 1960  MRN: [id number]  Referring provider: Rio Coffey*  Dx:   Encounter Diagnosis     ICD-10-CM    1. Acute pain of right shoulder  M25.511       2. S/P right rotator cuff repair  Z98.890           Start Time: 08  Stop Time: 08  Total time in clinic (min): 39 minutes    Subjective: Pt reports continued stiffness and pain in his R shoulder but generally feels good about where he is at. Pt feels good in his sling and reports compliance with his HEP.       Objective: See treatment diary below      Assessment: Tolerated treatment well. Patient demonstrated fatigue post treatment, exhibited good technique with therapeutic exercises, and would benefit from continued PT. Continues with limitations in most directions due to pain, tightness, and surgical protocols.       Plan: Continue per plan of care.  Progress treatment as tolerated.          Insurance:  A/CMS Eval/ Re-eval Auth #/ Referral # Total units or visits Start date  Expiration date KX? Visit limitation?  PT only or  PT+OT? Co-Insurance   AMA 25 NO AUTH  25  90 PCY  $0                 POC Start Date POC Expiration Date Signed POC?   25 pending      Date               Visits/Units: NO AUTH Used               Authed: NO AUTH Remaining                   Precautions: RTC Repair Protocol  Access Code: [email or web address]  URL: [email or web address]  Date: 2025  Prepared by: Luis Armando Whittaker    Exercises  - Seated Scapular Retraction  - 3-4 x daily - 7 x weekly - 3 sets - 10 reps  - Seated Shoulder Flexion Towel Slide at Table Top  - 2-3 x daily - 7 x weekly - 2-3 sets - 10 reps - 3 hold  - Seated Elbow Flexion and Extension AROM  - 2-3 x daily - 7 x weekly - 3 sets - 10 reps  - Flexion-Extension Shoulder Pendulum with Table Support  - 2-3 x daily - 7 x weekly - 3 sets - 10 reps  - Supine Shoulder External Rotation with Dowel  - 2-3  x daily - 7 x weekly - 2-3 sets - 10 reps - 3 hold    Date   2/26 2/24 2/20/25   Visit Number   3 2 1 (IE)   Manuals        Shoulder PROM   BT performed flex, abd, ER BT performed flex, abd, ER                            Neuro Re-Ed         Scap retr   2x10 supine 2x10 supine    RTC isos                                                Ther Ex        pendulums   Circles 30x cw/ccw Circles 30x cw/ccw    Table slides   3x10 2x10    Supine cane ER PROM   3x10 gentle 2x10 gentle    Elbow AROM   10x full ROM 5x full ROM                                    Ther Activity                        Gait Training                        Modalities

## 2025-02-28 ENCOUNTER — OFFICE VISIT (OUTPATIENT)
Dept: PHYSICAL THERAPY | Facility: CLINIC | Age: 65
End: 2025-02-28
Payer: COMMERCIAL

## 2025-02-28 DIAGNOSIS — M25.511 ACUTE PAIN OF RIGHT SHOULDER: Primary | ICD-10-CM

## 2025-02-28 DIAGNOSIS — Z98.890 S/P RIGHT ROTATOR CUFF REPAIR: ICD-10-CM

## 2025-02-28 PROCEDURE — 97110 THERAPEUTIC EXERCISES: CPT

## 2025-02-28 PROCEDURE — 97140 MANUAL THERAPY 1/> REGIONS: CPT

## 2025-02-28 NOTE — PROGRESS NOTES
Daily Note     Today's date: 2025  Patient name: Efren Connell  : 1960  MRN: [id number]  Referring provider: Rio Coffey*  Dx:   Encounter Diagnosis     ICD-10-CM    1. Acute pain of right shoulder  M25.511       2. S/P right rotator cuff repair  Z98.890           Start Time: 0932  Stop Time: 1010  Total time in clinic (min): 38 minutes    Subjective: Pt states feeling about the same overall with his pain, minimal depending on what he is doing. Pt feels his ROM is improving but is being as careful as possible. No new complaints.       Objective: See treatment diary below      Assessment: Tolerated treatment well. Patient demonstrated fatigue post treatment, exhibited good technique with therapeutic exercises, and would benefit from continued PT. Pt demonstrated improved ROM within his surgical restrictions.       Plan: Continue per plan of care.  Progress treatment as tolerated.          Insurance:  AMA/CMS Eval/ Re-eval Auth #/ Referral # Total units or visits Start date  Expiration date KX? Visit limitation?  PT only or  PT+OT? Co-Insurance   AMA 25 NO AUTH  25  90 PCY  $0                 POC Start Date POC Expiration Date Signed POC?   25 pending      Date               Visits/Units: NO AUTH Used               Authed: NO AUTH Remaining                   Precautions: RTC Repair Protocol  Access Code: [email or web address]  URL: [email or web address]  Date: 2025  Prepared by: Luis Armando Whittaker    Exercises  - Seated Scapular Retraction  - 3-4 x daily - 7 x weekly - 3 sets - 10 reps  - Seated Shoulder Flexion Towel Slide at Table Top  - 2-3 x daily - 7 x weekly - 2-3 sets - 10 reps - 3 hold  - Seated Elbow Flexion and Extension AROM  - 2-3 x daily - 7 x weekly - 3 sets - 10 reps  - Flexion-Extension Shoulder Pendulum with Table Support  - 2-3 x daily - 7 x weekly - 3 sets - 10 reps  - Supine Shoulder External Rotation with Dowel  - 2-3 x daily - 7 x  weekly - 2-3 sets - 10 reps - 3 hold    Date  2/28 2/26 2/24 2/20/25   Visit Number  4 3 2 1 (IE)   Manuals        Shoulder PROM  BT performed flex, abd, ER BT performed flex, abd, ER BT performed flex, abd, ER                            Neuro Re-Ed         Scap retr  2x10 supine 2x10 supine 2x10 supine    RTC isos                                                Ther Ex        pendulums  Circles 30x cw/ccw Circles 30x cw/ccw Circles 30x cw/ccw    Table slides  3x10 (110) 3x10 2x10    Supine cane ER PROM  3x10 gentle 3x10 gentle 2x10 gentle    Elbow AROM   10x full ROM 5x full ROM    Supine GH flex PROM  W/contralateral UE 2x10                              Ther Activity                        Gait Training                        Modalities

## 2025-03-03 ENCOUNTER — OFFICE VISIT (OUTPATIENT)
Dept: PHYSICAL THERAPY | Facility: CLINIC | Age: 65
End: 2025-03-03
Payer: COMMERCIAL

## 2025-03-03 DIAGNOSIS — M25.511 ACUTE PAIN OF RIGHT SHOULDER: Primary | ICD-10-CM

## 2025-03-03 DIAGNOSIS — Z98.890 S/P RIGHT ROTATOR CUFF REPAIR: ICD-10-CM

## 2025-03-03 PROCEDURE — 97140 MANUAL THERAPY 1/> REGIONS: CPT

## 2025-03-03 PROCEDURE — 97110 THERAPEUTIC EXERCISES: CPT

## 2025-03-03 NOTE — PROGRESS NOTES
Daily Note     Today's date: 3/3/2025  Patient name: Efren Connell  : 1960  MRN: [id number]  Referring provider: Rio Coffey*  Dx:   Encounter Diagnosis     ICD-10-CM    1. Acute pain of right shoulder  M25.511       2. S/P right rotator cuff repair  Z98.890           Start Time: 1019  Stop Time: 1100  Total time in clinic (min): 41 minutes    Subjective: Pt reports not doing his exercises this morning so he is more stiff than usual. He bumped his arm the other day which hurt a little but doesn't have any issues now. No new issues or complaints.       Objective: See treatment diary below      Assessment: Tolerated treatment well. Patient demonstrated fatigue post treatment, exhibited good technique with therapeutic exercises, and would benefit from continued PT      Plan: Continue per plan of care.  Progress treatment as tolerated.          Insurance:  AMA/CMS Eval/ Re-eval Auth #/ Referral # Total units or visits Start date  Expiration date KX? Visit limitation?  PT only or  PT+OT? Co-Insurance   AMA 25 NO AUTH  25  90 PCY  $0                 POC Start Date POC Expiration Date Signed POC?   25 pending      Date               Visits/Units: NO AUTH Used               Authed: NO AUTH Remaining                   Precautions: RTC Repair Protocol  Access Code: [email or web address]  URL: [email or web address]  Date: 2025  Prepared by: Luis Armando Whittaker    Exercises  - Seated Scapular Retraction  - 3-4 x daily - 7 x weekly - 3 sets - 10 reps  - Seated Shoulder Flexion Towel Slide at Table Top  - 2-3 x daily - 7 x weekly - 2-3 sets - 10 reps - 3 hold  - Seated Elbow Flexion and Extension AROM  - 2-3 x daily - 7 x weekly - 3 sets - 10 reps  - Flexion-Extension Shoulder Pendulum with Table Support  - 2-3 x daily - 7 x weekly - 3 sets - 10 reps  - Supine Shoulder External Rotation with Dowel  - 2-3 x daily - 7 x weekly - 2-3 sets - 10 reps - 3 hold    Date 3/3 2/28  2/26 2/24 2/20/25   Visit Number 5 4 3 2 1 (IE)   Manuals        Shoulder PROM BT performed flex, abd, ER BT performed flex, abd, ER BT performed flex, abd, ER BT performed flex, abd, ER                            Neuro Re-Ed         Scap retr 2x10 supine 2x10 supine 2x10 supine 2x10 supine    RTC isos                                                Ther Ex        pendulums Circles 30x cw/ccw Circles 30x cw/ccw Circles 30x cw/ccw Circles 30x cw/ccw    Table slides Pball PROM flexion 3x10 3x10 (110) 3x10 2x10    Supine cane ER PROM 3x10 gentle (25) 3x10 gentle 3x10 gentle 2x10 gentle    Elbow AROM   10x full ROM 5x full ROM    Supine GH flex PROM W/contralateral UE 2x10 W/contralateral UE 2x10                              Ther Activity                        Gait Training                        Modalities

## 2025-03-05 ENCOUNTER — OFFICE VISIT (OUTPATIENT)
Dept: PHYSICAL THERAPY | Facility: CLINIC | Age: 65
End: 2025-03-05
Payer: COMMERCIAL

## 2025-03-05 DIAGNOSIS — M25.511 ACUTE PAIN OF RIGHT SHOULDER: Primary | ICD-10-CM

## 2025-03-05 DIAGNOSIS — Z98.890 S/P RIGHT ROTATOR CUFF REPAIR: ICD-10-CM

## 2025-03-05 PROCEDURE — 97140 MANUAL THERAPY 1/> REGIONS: CPT

## 2025-03-05 PROCEDURE — 97110 THERAPEUTIC EXERCISES: CPT

## 2025-03-05 NOTE — PROGRESS NOTES
Daily Note     Today's date: 3/5/2025  Patient name: Efren Connell  : 1960  MRN: [id number]  Referring provider: Rio Coffey*  Dx:   Encounter Diagnosis     ICD-10-CM    1. Acute pain of right shoulder  M25.511       2. S/P right rotator cuff repair  Z98.890           Start Time: 1020  Stop Time: 1059  Total time in clinic (min): 39 minutes    Subjective: Pt reports no significant changes, he continues to feel tight with some aches and pain but overall nothing remarkable. No new complaints.       Objective: See treatment diary below      Assessment: Tolerated treatment well. Patient demonstrated fatigue post treatment, exhibited good technique with therapeutic exercises, and would benefit from continued PT. Pt initiated PROM abd based on his shoulder surgical protocol. Pt demonstrated improved shoulder ER PROM today without any increases in pain.       Plan: Continue per plan of care.  Progress treatment as tolerated.          Insurance:  AMA/CMS Eval/ Re-eval Auth #/ Referral # Total units or visits Start date  Expiration date KX? Visit limitation?  PT only or  PT+OT? Co-Insurance   AMA 25 NO AUTH  25  90 PCY  $0                 POC Start Date POC Expiration Date Signed POC?   25 pending      Date               Visits/Units: NO AUTH Used               Authed: NO AUTH Remaining                   Precautions: RTC Repair Protocol  Access Code: [email or web address]  URL: [email or web address]  Date: 2025  Prepared by: Luis Armando Whittaker    Exercises  - Seated Scapular Retraction  - 3-4 x daily - 7 x weekly - 3 sets - 10 reps  - Seated Shoulder Flexion Towel Slide at Table Top  - 2-3 x daily - 7 x weekly - 2-3 sets - 10 reps - 3 hold  - Seated Elbow Flexion and Extension AROM  - 2-3 x daily - 7 x weekly - 3 sets - 10 reps  - Flexion-Extension Shoulder Pendulum with Table Support  - 2-3 x daily - 7 x weekly - 3 sets - 10 reps  - Supine Shoulder External Rotation  with Dowel  - 2-3 x daily - 7 x weekly - 2-3 sets - 10 reps - 3 hold    Date 3/5 3/3 2/28 2/26 2/24 2/20/25   Visit Number 6 5 4 3 2 1 (IE)   Manuals         Shoulder PROM BT performed flex, abd, ER BT performed flex, abd, ER BT performed flex, abd, ER BT performed flex, abd, ER BT performed flex, abd, ER                               Neuro Re-Ed          Scap retr  2x10 supine 2x10 supine 2x10 supine 2x10 supine    RTC isos                                                      Ther Ex         pendulums Circles 30x cw/ccw, AP Circles 30x cw/ccw Circles 30x cw/ccw Circles 30x cw/ccw Circles 30x cw/ccw    Table slides Pball PROM flexion 3x10 Pball PROM flexion 3x10 3x10 (110) 3x10 2x10    Supine cane ER PROM 3x10 gentle (45) 3x10 gentle (25) 3x10 gentle 3x10 gentle 2x10 gentle    Elbow AROM    10x full ROM 5x full ROM    Supine GH flex PROM W/contralateral UE 2x10 W/contralateral UE 2x10 W/contralateral UE 2x10      Standing GH abd PROM 3x10 in mirror                          Ther Activity                           Gait Training                           Modalities

## 2025-03-07 ENCOUNTER — OFFICE VISIT (OUTPATIENT)
Dept: PHYSICAL THERAPY | Facility: CLINIC | Age: 65
End: 2025-03-07
Payer: COMMERCIAL

## 2025-03-07 DIAGNOSIS — M25.511 ACUTE PAIN OF RIGHT SHOULDER: Primary | ICD-10-CM

## 2025-03-07 DIAGNOSIS — Z98.890 S/P RIGHT ROTATOR CUFF REPAIR: ICD-10-CM

## 2025-03-07 PROCEDURE — 97110 THERAPEUTIC EXERCISES: CPT

## 2025-03-07 PROCEDURE — 97112 NEUROMUSCULAR REEDUCATION: CPT

## 2025-03-07 NOTE — PROGRESS NOTES
Daily Note     Today's date: 3/7/2025  Patient name: Efren Connell  : 1960  MRN: [id number]  Referring provider: Rio Coffey*  Dx:   Encounter Diagnosis     ICD-10-CM    1. Acute pain of right shoulder  M25.511       2. S/P right rotator cuff repair  Z98.890           Start Time: 1021  Stop Time: 1100  Total time in clinic (min): 39 minutes    Subjective: Pt states he had a bad day yesterday where it was more irritable and tight, isn't sure why. However, he feels good today without the same discomfort. Pt continues his HEP without issue, no pain or complaints today.       Objective: See treatment diary below      Assessment: Tolerated treatment well. Patient demonstrated fatigue post treatment, exhibited good technique with therapeutic exercises, and would benefit from continued PT. Introduced pt to ER/IR isos in doorway to promote RTC activation and light strengthening as allowed by his surgical protocol. Pt didn't have any increased pain with his added exercise. HEP updated.       Plan: Continue per plan of care.  Progress treatment as tolerated.          Insurance:  AMA/CMS Eval/ Re-eval Auth #/ Referral # Total units or visits Start date  Expiration date KX? Visit limitation?  PT only or  PT+OT? Co-Insurance   AMA 25 NO AUTH  25  90 PCY  $0                 POC Start Date POC Expiration Date Signed POC?   25 pending      Date               Visits/Units: NO AUTH Used               Authed: NO AUTH Remaining                   Precautions: RTC Repair Protocol  Access Code: [email or web address]  URL: [email or web address]  Date: 2025  Prepared by: Luis Armando Whittaker    Exercises  - Seated Scapular Retraction  - 3-4 x daily - 7 x weekly - 3 sets - 10 reps  - Seated Shoulder Flexion Towel Slide at Table Top  - 2-3 x daily - 7 x weekly - 2-3 sets - 10 reps - 3 hold  - Seated Elbow Flexion and Extension AROM  - 2-3 x daily - 7 x weekly - 3 sets - 10 reps  -  Flexion-Extension Shoulder Pendulum with Table Support  - 2-3 x daily - 7 x weekly - 3 sets - 10 reps  - Supine Shoulder External Rotation with Dowel  - 2-3 x daily - 7 x weekly - 2-3 sets - 10 reps - 3 hold    Date 3/7 3/5 3/3 2/28 2/26 2/24 2/20/25   Visit Number 7 6 5 4 3 2 1 (IE)   Manuals          Shoulder PROM BT performed all directions BT performed flex, abd, ER BT performed flex, abd, ER BT performed flex, abd, ER BT performed flex, abd, ER BT performed flex, abd, ER                                  Neuro Re-Ed           Scap retr 2x10 supine 2x10 supine 2x10 supine 2x10 supine 2x10 supine 2x10 supine    RTC isos 1x10 ER & IR                                                           Ther Ex          pendulums Circles 30x cw/ccw, AP Circles 30x cw/ccw, AP Circles 30x cw/ccw Circles 30x cw/ccw Circles 30x cw/ccw Circles 30x cw/ccw    Table slides Table slides 3x10 Pball PROM flexion 3x10 Pball PROM flexion 3x10 3x10 (110) 3x10 2x10    Supine cane ER PROM 3x10  3x10 gentle (45) 3x10 gentle (25) 3x10 gentle 3x10 gentle 2x10 gentle    Elbow AROM     10x full ROM 5x full ROM    Supine GH flex PROM W/contralateral UE 2x10 W/contralateral UE 2x10 W/contralateral UE 2x10 W/contralateral UE 2x10      Standing GH abd PROM 3x10 in mirror 3x10 in mirror                            Ther Activity                              Gait Training                              Modalities

## 2025-03-10 ENCOUNTER — OFFICE VISIT (OUTPATIENT)
Dept: PHYSICAL THERAPY | Facility: CLINIC | Age: 65
End: 2025-03-10
Payer: COMMERCIAL

## 2025-03-10 DIAGNOSIS — M25.511 ACUTE PAIN OF RIGHT SHOULDER: Primary | ICD-10-CM

## 2025-03-10 DIAGNOSIS — Z98.890 S/P RIGHT ROTATOR CUFF REPAIR: ICD-10-CM

## 2025-03-10 PROCEDURE — 97140 MANUAL THERAPY 1/> REGIONS: CPT | Performed by: PHYSICAL THERAPIST

## 2025-03-10 PROCEDURE — 97110 THERAPEUTIC EXERCISES: CPT | Performed by: PHYSICAL THERAPIST

## 2025-03-10 NOTE — PROGRESS NOTES
Daily Note     Today's date: 3/10/2025  Patient name: Efren Connell  : 1960  MRN: [id number]  Referring provider: Rio Coffey*  Dx:   Encounter Diagnosis     ICD-10-CM    1. Acute pain of right shoulder  M25.511       2. S/P right rotator cuff repair  Z98.890                      Subjective: Patient reports he feels this rehab is going better than his one on the last shoulder.        Objective: See treatment diary below      Assessment: Tolerated treatment well. Patient continues to progress with shoulder flexion and ER ROM, though continues to have tightness at end range.  Continued to emphasize gradual shoulder ER and flexion passively with high repetitions to improve ROM.  Patient demonstrated fatigue post treatment, exhibited good technique with therapeutic exercises, and would benefit from continued PT      Plan: Continue per plan of care.  Progress treatment as tolerated.  Progress challenge as appropriate with irritability.        Insurance:  [employer]/CMS Eval/ Re-eval Auth #/ Referral # Total units or visits Start date  Expiration date KX? Visit limitation?  PT only or  PT+OT? Co-Insurance   AMA 25 NO AUTH  25  90 PCY  $0                 POC Start Date POC Expiration Date Signed POC?   25 pending      Date               Visits/Units: NO AUTH Used               Authed: NO AUTH Remaining                   Precautions: RTC Repair Protocol  Access Code: [email or web address]  URL: [email or web address]  Date: 2025  Prepared by: Luis Armando Whittaker    Exercises  - Seated Scapular Retraction  - 3-4 x daily - 7 x weekly - 3 sets - 10 reps  - Seated Shoulder Flexion Towel Slide at Table Top  - 2-3 x daily - 7 x weekly - 2-3 sets - 10 reps - 3 hold  - Seated Elbow Flexion and Extension AROM  - 2-3 x daily - 7 x weekly - 3 sets - 10 reps  - Flexion-Extension Shoulder Pendulum with Table Support  - 2-3 x daily - 7 x weekly - 3 sets - 10 reps  - Supine Shoulder External  Rotation with Dowel  - 2-3 x daily - 7 x weekly - 2-3 sets - 10 reps - 3 hold    Date 3/10 3/7 3/5 3/3 2/28 2/26 2/24 2/20/25   Visit Number [phone] 3 2 1 (IE)   Manuals           Shoulder PROM BR performed all directions BT performed all directions BT performed flex, abd, ER BT performed flex, abd, ER BT performed flex, abd, ER BT performed flex, abd, ER BT performed flex, abd, ER                                     Neuro Re-Ed            Scap retr  2x10 supine 2x10 supine 2x10 supine 2x10 supine 2x10 supine 2x10 supine    RTC isos 1x10 ER ball 1x10 ER & IR                                                                Ther Ex           pendulums Circles 30x ea cw/ccw Circles 30x cw/ccw, AP Circles 30x cw/ccw, AP Circles 30x cw/ccw Circles 30x cw/ccw Circles 30x cw/ccw Circles 30x cw/ccw    Table slides  Table slides 3x10 Pball PROM flexion 3x10 Pball PROM flexion 3x10 3x10 (110) 3x10 2x10    Supine cane ER PROM 3x10 3x10  3x10 gentle (45) 3x10 gentle (25) 3x10 gentle 3x10 gentle 2x10 gentle    Elbow AROM      10x full ROM 5x full ROM    Supine GH flex PROM W/ contralateral UE 2x10 W/contralateral UE 2x10 W/contralateral UE 2x10 W/contralateral UE 2x10 W/contralateral UE 2x10      Standing GH abd PROM 3x10 in mirror 3x10 in mirror 3x10 in mirror                              Ther Activity                                 Gait Training                                 Modalities

## 2025-03-12 ENCOUNTER — OFFICE VISIT (OUTPATIENT)
Dept: PHYSICAL THERAPY | Facility: CLINIC | Age: 65
End: 2025-03-12
Payer: COMMERCIAL

## 2025-03-12 DIAGNOSIS — M25.511 ACUTE PAIN OF RIGHT SHOULDER: Primary | ICD-10-CM

## 2025-03-12 DIAGNOSIS — Z98.890 S/P RIGHT ROTATOR CUFF REPAIR: ICD-10-CM

## 2025-03-12 PROCEDURE — 97140 MANUAL THERAPY 1/> REGIONS: CPT

## 2025-03-12 PROCEDURE — 97110 THERAPEUTIC EXERCISES: CPT

## 2025-03-12 NOTE — PROGRESS NOTES
Daily Note     Today's date: 3/12/2025  Patient name: Efren Connell  : 1960  MRN: [id number]  Referring provider: Rio Coffey*  Dx:   Encounter Diagnosis     ICD-10-CM    1. Acute pain of right shoulder  M25.511       2. S/P right rotator cuff repair  Z98.890           Start Time: 1015  Stop Time: 1050  Total time in clinic (min): 35 minutes    Subjective: Pt states he continues to feel better overall in his shoulder with more mobility and less pain, however he is feeling tighter today than he did when he was here Monday      Objective: See treatment diary below      Assessment: Tolerated treatment well. Patient demonstrated fatigue post treatment, exhibited good technique with therapeutic exercises, and would benefit from continued PT      Plan: Continue per plan of care.  Progress treatment as tolerated.          Insurance:  [employer]/CMS Eval/ Re-eval Auth #/ Referral # Total units or visits Start date  Expiration date KX? Visit limitation?  PT only or  PT+OT? Co-Insurance   AMA 25 NO AUTH  25  90 PCY  $0                 POC Start Date POC Expiration Date Signed POC?   25 pending      Date               Visits/Units: NO AUTH Used               Authed: NO AUTH Remaining                   Precautions: RTC Repair Protocol  Access Code: [email or web address]  URL: [email or web address]  Date: 2025  Prepared by: Luis Armando Whittaker    Exercises  - Seated Scapular Retraction  - 3-4 x daily - 7 x weekly - 3 sets - 10 reps  - Seated Shoulder Flexion Towel Slide at Table Top  - 2-3 x daily - 7 x weekly - 2-3 sets - 10 reps - 3 hold  - Seated Elbow Flexion and Extension AROM  - 2-3 x daily - 7 x weekly - 3 sets - 10 reps  - Flexion-Extension Shoulder Pendulum with Table Support  - 2-3 x daily - 7 x weekly - 3 sets - 10 reps  - Supine Shoulder External Rotation with Dowel  - 2-3 x daily - 7 x weekly - 2-3 sets - 10 reps - 3 hold    Date 3/12 3/10 3/7 3/5 3/3   Visit Number [phone]  [phone]   Manuals        Shoulder PROM BT performed all directions BR performed all directions BT performed all directions BT performed flex, abd, ER BT performed flex, abd, ER                           Neuro Re-Ed         Scap retr 2x10  2x10 supine 2x10 supine 2x10 supine   RTC isos 1x10 ER & IR 1x10 ER ball 1x10 ER & IR                                             Ther Ex        pendulums Circles 30x ea cw/ccw Circles 30x ea cw/ccw Circles 30x cw/ccw, AP Circles 30x cw/ccw, AP Circles 30x cw/ccw   Table slides Table slides 3x10  Table slides 3x10 Pball PROM flexion 3x10 Pball PROM flexion 3x10   Supine cane ER PROM 3x10 3x10 3x10  3x10 gentle (45) 3x10 gentle (25)   Elbow AROM        Supine GH flex PROM  W/ contralateral UE 2x10 W/contralateral UE 2x10 W/contralateral UE 2x10 W/contralateral UE 2x10   Standing GH abd PROM  3x10 in mirror 3x10 in mirror 3x10 in mirror                    Ther Activity                        Gait Training                        Modalities

## 2025-03-14 ENCOUNTER — OFFICE VISIT (OUTPATIENT)
Dept: PHYSICAL THERAPY | Facility: CLINIC | Age: 65
End: 2025-03-14
Payer: COMMERCIAL

## 2025-03-14 DIAGNOSIS — Z98.890 S/P RIGHT ROTATOR CUFF REPAIR: ICD-10-CM

## 2025-03-14 DIAGNOSIS — M25.511 ACUTE PAIN OF RIGHT SHOULDER: Primary | ICD-10-CM

## 2025-03-14 PROCEDURE — 97140 MANUAL THERAPY 1/> REGIONS: CPT

## 2025-03-14 PROCEDURE — 97110 THERAPEUTIC EXERCISES: CPT

## 2025-03-14 NOTE — PROGRESS NOTES
Daily Note     Today's date: 3/14/2025  Patient name: Efren Connell  : 1960  MRN: [id number]  Referring provider: Rio Coffey*  Dx:   Encounter Diagnosis     ICD-10-CM    1. Acute pain of right shoulder  M25.511       2. S/P right rotator cuff repair  Z98.890           Start Time: 1017  Stop Time: 1057  Total time in clinic (min): 40 minutes    Subjective: Pt reports that his shoulder continues with stiffness and aches but overall feels good. Pt states overhead PROM flexion is still difficult and can be painful but the other motions feel fine.       Objective: See treatment diary below      Assessment: Tolerated treatment well. Patient demonstrated fatigue post treatment, exhibited good technique with therapeutic exercises, and would benefit from continued PT. Pt is now 5 weeks post-op, pulleys were added in accordance with his HEP. Pt was given an updated HEP with his progressions.       Plan: Continue per plan of care.  Progress treatment as tolerated.          Insurance:  AMA/CMS Eval/ Re-eval Auth #/ Referral # Total units or visits Start date  Expiration date KX? Visit limitation?  PT only or  PT+OT? Co-Insurance   AMA 25 NO AUTH  25  90 PCY  $0                 POC Start Date POC Expiration Date Signed POC?   25 pending      Date               Visits/Units: NO AUTH Used               Authed: NO AUTH Remaining                   Precautions: RTC Repair Protocol  Access Code: [email or web address]  URL: [email or web address]  Date: 2025  Prepared by: Luis Armando Whittaker    Exercises  - Seated Scapular Retraction  - 3-4 x daily - 7 x weekly - 3 sets - 10 reps  - Seated Shoulder Flexion Towel Slide at Table Top  - 2-3 x daily - 7 x weekly - 2-3 sets - 10 reps - 3 hold  - Seated Elbow Flexion and Extension AROM  - 2-3 x daily - 7 x weekly - 3 sets - 10 reps  - Flexion-Extension Shoulder Pendulum with Table Support  - 2-3 x daily - 7 x weekly - 3 sets - 10  reps  - Supine Shoulder External Rotation with Dowel  - 2-3 x daily - 7 x weekly - 2-3 sets - 10 reps - 3 hold    Date 3/14 3/12 3/10 3/7 3/5 3/3   Visit Number [phone]   Manuals         Shoulder PROM BT performed all directions BT performed all directions BR performed all directions BT performed all directions BT performed flex, abd, ER BT performed flex, abd, ER                              Neuro Re-Ed          Scap retr  2x10  2x10 supine 2x10 supine 2x10 supine   RTC isos 1x15 ER & IR 1x10 ER & IR 1x10 ER ball 1x10 ER & IR                                                  Ther Ex         pendulums Circles 30x ea cw/ccw Circles 30x ea cw/ccw Circles 30x ea cw/ccw Circles 30x cw/ccw, AP Circles 30x cw/ccw, AP Circles 30x cw/ccw   Table slides Table slides 3x10 Table slides 3x10  Table slides 3x10 Pball PROM flexion 3x10 Pball PROM flexion 3x10   Supine cane ER PROM 3x10 3x10 3x10 3x10  3x10 gentle (45) 3x10 gentle (25)   Elbow AROM         Supine GH flex PROM W/cane 1x10  W/ contralateral UE 2x10 W/contralateral UE 2x10 W/contralateral UE 2x10 W/contralateral UE 2x10   Standing GH abd PROM 2x10 in mirror  3x10 in mirror 3x10 in mirror 3x10 in mirror    pulleys Flexion 2x10                 Ther Activity                           Gait Training                           Modalities

## 2025-03-17 ENCOUNTER — EVALUATION (OUTPATIENT)
Dept: PHYSICAL THERAPY | Facility: CLINIC | Age: 65
End: 2025-03-17
Payer: COMMERCIAL

## 2025-03-17 DIAGNOSIS — M25.511 ACUTE PAIN OF RIGHT SHOULDER: Primary | ICD-10-CM

## 2025-03-17 DIAGNOSIS — Z98.890 S/P RIGHT ROTATOR CUFF REPAIR: ICD-10-CM

## 2025-03-17 PROCEDURE — 97110 THERAPEUTIC EXERCISES: CPT

## 2025-03-17 NOTE — PROGRESS NOTES
PT Re-Evaluation     Today's date: 3/17/2025  Patient name: Efren Connell  : 1960  MRN: [id number]  Referring provider: Rio Coffey*  Dx:   Encounter Diagnosis     ICD-10-CM    1. Acute pain of right shoulder  M25.511       2. S/P right rotator cuff repair  Z98.890             Start Time: 1020  Stop Time: 1058  Total time in clinic (min): 38 minutes    Assessment  Impairments: abnormal coordination, abnormal muscle tone, abnormal or restricted ROM, abnormal movement, activity intolerance, impaired physical strength, pain with function, poor posture , poor body mechanics, unable to perform ADL, participation limitations, activity limitations and endurance  Symptom irritability: moderate    Assessment details: Pt is a 64 y.o male who presents to skilled physical therapy for his 11th visit, less than 6 weeks s/p R RTC repair and shoulder debridement, performed on 25. Pt arrives utilizing his sling while demonstrating fair posture, increased R shoulder A/PROM, increased elbow AROM, and slightly less TTP along his anterior R shoulder. Due to surgical timeframes and restrictions, AROM and MMT was not performed today. Pt is slowly progressing through his surgical protocol, however he continues to guard during PROM stretching leading to continued restrictions. Pt's continued pain and deficits are preventing him from performing self care, ADLs, recreational activities, and work duties without compensations. Pt was re-educated on his diagnosis, prognosis, POC, HEP, and surgical precautions/contraindications. Pt would continue to benefit from skilled physical therapy to reduce his pain, address his deficits, progress towards his goals, work through his post-surgical protocol, and return to his PLOF.   Understanding of Dx/Px/POC: good     Prognosis: good    Goals  Short Term Goals:  1) Pt will initiate and progress his HEP in 3-4 weeks.- Met  2) Pt will improve his sitting and standing posture to good  in 3-4 weeks.- Progressing  3) Pt will improve his PROM shoulder flexion to at least 90 degrees to improve self care in 3-4 weeks.- Met  4) Pt will initiate AROM shoulder exercises in 3-4 weeks to improve his ADLs.- Not Yet    Long Term Goals:  1) Pt will be able to sleep for 6-8 hours without shoulder pain in 6-8 weeks.- Progressing  2) Pt will be able to get dressed with less than 2/10 pain in 6-8 weeks.- Progressing  3) Pt will be able to put on a coat with less than 2/10 apin in 6-8 weeks.- Progressing  4) Pt will be able to reach a shelf at or above shoulder height with less than 3/10 pain in 8-12 weeks.- Progressing  5) Pt will be able to perform his ADLs with less than 3/10 pain in 8-12 weeks.- Progressing    Plan  Patient would benefit from: skilled physical therapy and PT eval  Planned modality interventions: cryotherapy and thermotherapy: hydrocollator packs    Planned therapy interventions: activity modification, joint mobilization, ADL retraining, manual therapy, body mechanics training, motor coordination training, neuromuscular re-education, patient/caregiver education, coordination, postural training, strengthening, stretching, therapeutic activities, flexibility, functional ROM exercises, graded exercise and home exercise program    Frequency: 2-3x week  Duration in weeks: 8  Plan of Care beginning date: 3/17/2025  Plan of Care expiration date: 5/12/2025  Treatment plan discussed with: patient        Subjective Evaluation    History of Present Illness  Date of surgery: 2/6/2025  Mechanism of injury: surgery  Mechanism of injury: 3/17/25 Update:  Pt still has difficulty doing PROM with the cane overhead because he feels it isn't stable. However the pulleys are a lot better. Pt feel much better over the past few weeks, he has reported increases in his ROM. Pt goes between the recliner and the bed, has been improving. Pt has another follow-up on 3/28. Pt denies any numbness or tingling in his UE. Pt  also hasn't been having any sharp pains anymore, more dull and achy. Pt states wearing his sling all the time essentially, he is cautious around his dogs and feels if he has it off he will end up using it where he is not supposed to.   Patient Goals  Patient goals for therapy: decreased pain, increased motion, increased strength, return to sport/leisure activities, independence with ADLs/IADLs and return to work  Patient goal: lift overhead  Pain  Current pain ratin  At best pain ratin  At worst pain ratin (cane shoulder flexion)  Location: anterior R shoulder  Quality: tight and dull ache  Relieving factors: medications  Aggravating factors: stair climbing, lifting and overhead activity  Progression: improved    Social Support  Steps to enter house: yes  Stairs in house: yes   Lives in: multiple-level home  Lives with: spouse    Employment status: working  Hand dominance: right  Exercise history: pendulums          Objective     Postural Observations  Seated posture: fair  Standing posture: fair  Correction of posture: has no consistent effect      Tenderness     Right Shoulder  Tenderness in the AC joint, acromion and supraspinatus tendon. No tenderness in the biceps tendon (proximal).     Active Range of Motion     Right Elbow   Flexion: 138 degrees   Extension: 11 degrees     Additional Active Range of Motion Details  NT Today    Passive Range of Motion     Right Shoulder   Flexion: Right shoulder passive forward flexion: supine: 90, seated pulley: 100. with pain  Abduction: 90 (tight) degrees   External rotation 0°: 56 degrees with pain  Internal rotation 0°: WFL    Strength/Myotome Testing     Additional Strength Details  NT Today                Insurance:  AMA/CMS Eval/ Re-eval Auth #/ Referral # Total units or visits Start date  Expiration date KX? Visit limitation?  PT only or  PT+OT? Co-Insurance   AMA 25 NO AUTH  25  90 PCY  $0    3/17/25             POC Start Date POC  Expiration Date Signed POC?   2/20/25 4/17/25 pending   3/17/25 5/12/25 pending      Date               Visits/Units: NO AUTH Used               Authed: NO AUTH Remaining                   Precautions: RTC Repair Protocol  Access Code: [email or web address]  URL: [email or web address]  Date: 02/20/2025  Prepared by: Luis Armando Whittaker    Exercises  - Seated Scapular Retraction  - 3-4 x daily - 7 x weekly - 3 sets - 10 reps  - Seated Shoulder Flexion Towel Slide at Table Top  - 2-3 x daily - 7 x weekly - 2-3 sets - 10 reps - 3 hold  - Seated Elbow Flexion and Extension AROM  - 2-3 x daily - 7 x weekly - 3 sets - 10 reps  - Flexion-Extension Shoulder Pendulum with Table Support  - 2-3 x daily - 7 x weekly - 3 sets - 10 reps  - Supine Shoulder External Rotation with Dowel  - 2-3 x daily - 7 x weekly - 2-3 sets - 10 reps - 3 hold    Date 3/17 3/14 3/12 3/10 3/7 3/5   Visit Number [phone]   Manuals         Shoulder PROM  BT performed all directions BT performed all directions BR performed all directions BT performed all directions BT performed flex, abd, ER                              Neuro Re-Ed          Scap retr   2x10  2x10 supine 2x10 supine   RTC isos 1x10 ER & IR 1x15 ER & IR 1x10 ER & IR 1x10 ER ball 1x10 ER & IR                                                 Ther Ex         pendulums  Circles 30x ea cw/ccw Circles 30x ea cw/ccw Circles 30x ea cw/ccw Circles 30x cw/ccw, AP Circles 30x cw/ccw, AP   Table slides  Table slides 3x10 Table slides 3x10  Table slides 3x10 Pball PROM flexion 3x10   Supine cane ER PROM  3x10 3x10 3x10 3x10  3x10 gentle (45)   Elbow AROM         Supine GH flex PROM  W/cane 1x10  W/ contralateral UE 2x10 W/contralateral UE 2x10 W/contralateral UE 2x10   Standing GH abd PROM  2x10 in mirror  3x10 in mirror 3x10 in mirror 3x10 in mirror   pulleys Flexion 2x10 Flexion 2x10                Ther Activity                           Gait Training                           Modalities

## 2025-03-17 NOTE — LETTER
2025    Rio Coffey MD  [address] [place] [id number]    Patient: Efren Connell   YOB: 1960   Date of Visit: 3/17/2025     Encounter Diagnosis     ICD-10-CM    1. Acute pain of right shoulder  M25.511       2. S/P right rotator cuff repair  Z98.890           Dear Dr. Coffey:    Thank you for your recent referral of Efren Connell. Please review the attached evaluation summary from Efren's recent visit.     Please verify that you agree with the plan of care by signing the attached order.     If you have any questions or concerns, please do not hesitate to call.     I sincerely appreciate the opportunity to share in the care of one of your patients and hope to have another opportunity to work with you in the near future.       Sincerely,    Luis Armando Whittaker, PT      Referring Provider:      I certify that I have read the below Plan of Care and certify the need for these services furnished under this plan of treatment while under my care.                    Rio Coffey MD  [address] [place] [id number]  Via Fax: [phone]          PT Re-Evaluation     Today's date: 3/17/2025  Patient name: Efren Connell  : 1960  MRN: [id number]  Referring provider: Rio Coffey*  Dx:   Encounter Diagnosis     ICD-10-CM    1. Acute pain of right shoulder  M25.511       2. S/P right rotator cuff repair  Z98.890             Start Time: 1020  Stop Time: 1058  Total time in clinic (min): 38 minutes    Assessment  Impairments: abnormal coordination, abnormal muscle tone, abnormal or restricted ROM, abnormal movement, activity intolerance, impaired physical strength, pain with function, poor posture , poor body mechanics, unable to perform ADL, participation limitations, activity limitations and endurance  Symptom irritability: moderate    Assessment details: Pt is a 64 y.o male who presents to skilled physical  therapy for his 11th visit, less than 6 weeks s/p R RTC repair and shoulder debridement, performed on 2/6/25. Pt arrives utilizing his sling while demonstrating fair posture, increased R shoulder A/PROM, increased elbow AROM, and slightly less TTP along his anterior R shoulder. Due to surgical timeframes and restrictions, AROM and MMT was not performed today. Pt is slowly progressing through his surgical protocol, however he continues to guard during PROM stretching leading to continued restrictions. Pt's continued pain and deficits are preventing him from performing self care, ADLs, recreational activities, and work duties without compensations. Pt was re-educated on his diagnosis, prognosis, POC, HEP, and surgical precautions/contraindications. Pt would continue to benefit from skilled physical therapy to reduce his pain, address his deficits, progress towards his goals, work through his post-surgical protocol, and return to his PLOF.   Understanding of Dx/Px/POC: good     Prognosis: good    Goals  Short Term Goals:  1) Pt will initiate and progress his HEP in 3-4 weeks.- Met  2) Pt will improve his sitting and standing posture to good in 3-4 weeks.- Progressing  3) Pt will improve his PROM shoulder flexion to at least 90 degrees to improve self care in 3-4 weeks.- Met  4) Pt will initiate AROM shoulder exercises in 3-4 weeks to improve his ADLs.- Not Yet    Long Term Goals:  1) Pt will be able to sleep for 6-8 hours without shoulder pain in 6-8 weeks.- Progressing  2) Pt will be able to get dressed with less than 2/10 pain in 6-8 weeks.- Progressing  3) Pt will be able to put on a coat with less than 2/10 apin in 6-8 weeks.- Progressing  4) Pt will be able to reach a shelf at or above shoulder height with less than 3/10 pain in 8-12 weeks.- Progressing  5) Pt will be able to perform his ADLs with less than 3/10 pain in 8-12 weeks.- Progressing    Plan  Patient would benefit from: skilled physical therapy and PT  eval  Planned modality interventions: cryotherapy and thermotherapy: hydrocollator packs    Planned therapy interventions: activity modification, joint mobilization, ADL retraining, manual therapy, body mechanics training, motor coordination training, neuromuscular re-education, patient/caregiver education, coordination, postural training, strengthening, stretching, therapeutic activities, flexibility, functional ROM exercises, graded exercise and home exercise program    Frequency: 2-3x week  Duration in weeks: 8  Plan of Care beginning date: 3/17/2025  Plan of Care expiration date: 2025  Treatment plan discussed with: patient        Subjective Evaluation    History of Present Illness  Date of surgery: 2025  Mechanism of injury: surgery  Mechanism of injury: 3/17/25 Update:  Pt still has difficulty doing PROM with the cane overhead because he feels it isn't stable. However the pulleys are a lot better. Pt feel much better over the past few weeks, he has reported increases in his ROM. Pt goes between the recliner and the bed, has been improving. Pt has another follow-up on 3/28. Pt denies any numbness or tingling in his UE. Pt also hasn't been having any sharp pains anymore, more dull and achy. Pt states wearing his sling all the time essentially, he is cautious around his dogs and feels if he has it off he will end up using it where he is not supposed to.   Patient Goals  Patient goals for therapy: decreased pain, increased motion, increased strength, return to sport/leisure activities, independence with ADLs/IADLs and return to work  Patient goal: lift overhead  Pain  Current pain ratin  At best pain ratin  At worst pain ratin (cane shoulder flexion)  Location: anterior R shoulder  Quality: tight and dull ache  Relieving factors: medications  Aggravating factors: stair climbing, lifting and overhead activity  Progression: improved    Social Support  Steps to enter house: yes  Stairs in house:  yes   Lives in: multiple-level home  Lives with: spouse    Employment status: working  Hand dominance: right  Exercise history: pendulums          Objective     Postural Observations  Seated posture: fair  Standing posture: fair  Correction of posture: has no consistent effect      Tenderness     Right Shoulder  Tenderness in the AC joint, acromion and supraspinatus tendon. No tenderness in the biceps tendon (proximal).     Active Range of Motion     Right Elbow   Flexion: 138 degrees   Extension: 11 degrees     Additional Active Range of Motion Details  NT Today    Passive Range of Motion     Right Shoulder   Flexion: Right shoulder passive forward flexion: supine: 90, seated pulley: 100. with pain  Abduction: 90 (tight) degrees   External rotation 0°: 56 degrees with pain  Internal rotation 0°: WFL    Strength/Myotome Testing     Additional Strength Details  NT Today                Insurance:  AMA/CMS Eval/ Re-eval Auth #/ Referral # Total units or visits Start date  Expiration date KX? Visit limitation?  PT only or  PT+OT? Co-Insurance   AMA 2/20/25 NO AUTH  2/20/25 12/31/25  90 PCY  $0    3/17/25             POC Start Date POC Expiration Date Signed POC?   2/20/25 4/17/25 pending   3/17/25 5/12/25 pending      Date               Visits/Units: NO AUTH Used               Authed: NO AUTH Remaining                   Precautions: RTC Repair Protocol  Access Code: [email or web address]  URL: [email or web address]  Date: 02/20/2025  Prepared by: Luis Armando Whittaker    Exercises  - Seated Scapular Retraction  - 3-4 x daily - 7 x weekly - 3 sets - 10 reps  - Seated Shoulder Flexion Towel Slide at Table Top  - 2-3 x daily - 7 x weekly - 2-3 sets - 10 reps - 3 hold  - Seated Elbow Flexion and Extension AROM  - 2-3 x daily - 7 x weekly - 3 sets - 10 reps  - Flexion-Extension Shoulder Pendulum with Table Support  - 2-3 x daily - 7 x weekly - 3 sets - 10 reps  - Supine Shoulder External Rotation with Dowel  - 2-3 x daily - 7 x  weekly - 2-3 sets - 10 reps - 3 hold    Date 3/17 3/14 3/12 3/10 3/7 3/5   Visit Number 11 10 [phone] 7 6   Manuals         Shoulder PROM  BT performed all directions BT performed all directions BR performed all directions BT performed all directions BT performed flex, abd, ER                              Neuro Re-Ed          Scap retr   2x10  2x10 supine 2x10 supine   RTC isos 1x10 ER & IR 1x15 ER & IR 1x10 ER & IR 1x10 ER ball 1x10 ER & IR                                                 Ther Ex         pendulums  Circles 30x ea cw/ccw Circles 30x ea cw/ccw Circles 30x ea cw/ccw Circles 30x cw/ccw, AP Circles 30x cw/ccw, AP   Table slides  Table slides 3x10 Table slides 3x10  Table slides 3x10 Pball PROM flexion 3x10   Supine cane ER PROM  3x10 3x10 3x10 3x10  3x10 gentle (45)   Elbow AROM         Supine GH flex PROM  W/cane 1x10  W/ contralateral UE 2x10 W/contralateral UE 2x10 W/contralateral UE 2x10   Standing GH abd PROM  2x10 in mirror  3x10 in mirror 3x10 in mirror 3x10 in mirror   pulleys Flexion 2x10 Flexion 2x10                Ther Activity                           Gait Training                           Modalities

## 2025-03-19 ENCOUNTER — OFFICE VISIT (OUTPATIENT)
Dept: PHYSICAL THERAPY | Facility: CLINIC | Age: 65
End: 2025-03-19
Payer: COMMERCIAL

## 2025-03-19 DIAGNOSIS — Z98.890 S/P RIGHT ROTATOR CUFF REPAIR: ICD-10-CM

## 2025-03-19 DIAGNOSIS — M25.511 ACUTE PAIN OF RIGHT SHOULDER: Primary | ICD-10-CM

## 2025-03-19 PROCEDURE — 97110 THERAPEUTIC EXERCISES: CPT

## 2025-03-19 PROCEDURE — 97112 NEUROMUSCULAR REEDUCATION: CPT

## 2025-03-19 NOTE — PROGRESS NOTES
Daily Note     Today's date: 3/19/2025  Patient name: Efren Connell  : 1960  MRN: [id number]  Referring provider: Rio Coffey*  Dx:   Encounter Diagnosis     ICD-10-CM    1. Acute pain of right shoulder  M25.511       2. S/P right rotator cuff repair  Z98.890           Start Time: 1017  Stop Time: 1100  Total time in clinic (min): 43 minutes    Subjective: Pt reports that his shoulder has been feeling good since his last session, no new complaints. Pt woke up at 6am with back pain which he attributes to his chair.      Objective: See treatment diary below      Assessment: Tolerated treatment well. Patient demonstrated fatigue post treatment, exhibited good technique with therapeutic exercises, and would benefit from continued PT. Introduced pt to prone rows, required minimal VC for reduction of UT activation. Pt performed well without any pain, continues to show progress with his R shoulder PROM in all directions.       Plan: Continue per plan of care.  Progress treatment as tolerated.          Insurance:  AMA/CMS Eval/ Re-eval Auth #/ Referral # Total units or visits Start date  Expiration date KX? Visit limitation?  PT only or  PT+OT? Co-Insurance   AMA 25 NO AUTH  25  90 PCY  $0    3/17/25             POC Start Date POC Expiration Date Signed POC?   25 pending   3/17/25 5/12/25 pending      Date               Visits/Units: NO AUTH Used               Authed: NO AUTH Remaining                   Precautions: RTC Repair Protocol  Access Code: [email or web address]  URL: [email or web address]  Date: 2025  Prepared by: Luis Armando Whittaker    Exercises  - Seated Scapular Retraction  - 3-4 x daily - 7 x weekly - 3 sets - 10 reps  - Seated Shoulder Flexion Towel Slide at Table Top  - 2-3 x daily - 7 x weekly - 2-3 sets - 10 reps - 3 hold  - Seated Elbow Flexion and Extension AROM  - 2-3 x daily - 7 x weekly - 3 sets - 10 reps  - Flexion-Extension Shoulder Pendulum  with Table Support  - 2-3 x daily - 7 x weekly - 3 sets - 10 reps  - Supine Shoulder External Rotation with Dowel  - 2-3 x daily - 7 x weekly - 2-3 sets - 10 reps - 3 hold    Date 3/19 3/17 3/14 3/12 3/10 3/7   Visit Number 12 11 10 9 8 7   Manuals         Shoulder PROM BT flex & ER  BT performed all directions BT performed all directions BR performed all directions BT performed all directions                              Neuro Re-Ed          Scap retr    2x10  2x10 supine   RTC isos 2x10 ER & IR 1x10 ER & IR 1x15 ER & IR 1x10 ER & IR 1x10 ER ball 1x10 ER & IR   Prone rows 2x10                                            Ther Ex         pendulums   Circles 30x ea cw/ccw Circles 30x ea cw/ccw Circles 30x ea cw/ccw Circles 30x cw/ccw, AP   Table slides   Table slides 3x10 Table slides 3x10  Table slides 3x10   Supine cane ER PROM 3x10  3x10 3x10 3x10 3x10    Elbow AROM         Supine GH flex PROM   W/cane 1x10  W/ contralateral UE 2x10 W/contralateral UE 2x10   Standing GH abd PROM 2x10 in mirror  2x10 in mirror  3x10 in mirror 3x10 in mirror   pulleys Flexion 3x10 Flexion 2x10 Flexion 2x10               Ther Activity                           Gait Training                           Modalities

## 2025-03-21 ENCOUNTER — OFFICE VISIT (OUTPATIENT)
Dept: PHYSICAL THERAPY | Facility: CLINIC | Age: 65
End: 2025-03-21
Payer: COMMERCIAL

## 2025-03-21 DIAGNOSIS — M25.511 ACUTE PAIN OF RIGHT SHOULDER: Primary | ICD-10-CM

## 2025-03-21 DIAGNOSIS — Z98.890 S/P RIGHT ROTATOR CUFF REPAIR: ICD-10-CM

## 2025-03-21 PROCEDURE — 97112 NEUROMUSCULAR REEDUCATION: CPT

## 2025-03-21 PROCEDURE — 97110 THERAPEUTIC EXERCISES: CPT

## 2025-03-21 NOTE — PROGRESS NOTES
Daily Note     Today's date: 3/21/2025  Patient name: Efren Connell  : 1960  MRN: [id number]  Referring provider: Rio Coffey*  Dx:   Encounter Diagnosis     ICD-10-CM    1. Acute pain of right shoulder  M25.511       2. S/P right rotator cuff repair  Z98.890           Start Time: 1017  Stop Time: 1054  Total time in clinic (min): 37 minutes    Subjective: Pt states that his shoulder is feeling good today. He did a round of his exercises this morning and had no problems with them. Pt feels his shoulder is improving with more tolerance to his exercises with increasing ROM.       Objective: See treatment diary below      Assessment: Tolerated treatment well. Patient demonstrated fatigue post treatment, exhibited good technique with therapeutic exercises, and would benefit from continued PT      Plan: Continue per plan of care.  Progress treatment as tolerated.          Insurance:  [employer]/CMS Eval/ Re-eval Auth #/ Referral # Total units or visits Start date  Expiration date KX? Visit limitation?  PT only or  PT+OT? Co-Insurance   AMA 25 NO AUTH  25  90 PCY  $0    3/17/25             POC Start Date POC Expiration Date Signed POC?   25 pending   3/17/25 5/12/25 pending      Date               Visits/Units: NO AUTH Used               Authed: NO AUTH Remaining                   Precautions: RTC Repair Protocol  Access Code: [email or web address]  URL: [email or web address]  Date: 2025  Prepared by: Luis Armando Whittaker    Exercises  - Seated Scapular Retraction  - 3-4 x daily - 7 x weekly - 3 sets - 10 reps  - Seated Shoulder Flexion Towel Slide at Table Top  - 2-3 x daily - 7 x weekly - 2-3 sets - 10 reps - 3 hold  - Seated Elbow Flexion and Extension AROM  - 2-3 x daily - 7 x weekly - 3 sets - 10 reps  - Flexion-Extension Shoulder Pendulum with Table Support  - 2-3 x daily - 7 x weekly - 3 sets - 10 reps  - Supine Shoulder External Rotation with Dowel  - 2-3 x daily - 7  x weekly - 2-3 sets - 10 reps - 3 hold    Date 3/21 3/19 3/17 3/14 3/12 3/10   Visit Number 13 12 11 10 9 8   Manuals         Shoulder PROM BT flex & ER BT flex & ER  BT performed all directions BT performed all directions BR performed all directions                              Neuro Re-Ed          Scap retr     2x10    RTC isos 2x10 ER & IR 2x10 ER & IR 1x10 ER & IR 1x15 ER & IR 1x10 ER & IR 1x10 ER ball   Prone rows 2x10 2x10                                           Ther Ex         pendulums Fwd 30x   Circles 30x ea cw/ccw Circles 30x ea cw/ccw Circles 30x ea cw/ccw   Table slides    Table slides 3x10 Table slides 3x10    Supine cane ER PROM 3x10 3x10  3x10 3x10 3x10   Elbow AROM         Supine GH flex PROM W/cane 2x10   W/cane 1x10  W/ contralateral UE 2x10   Standing GH abd PROM 2x10 in mirror 2x10 in mirror  2x10 in mirror  3x10 in mirror   pulleys Flexion 3x10 Flexion 3x10 Flexion 2x10 Flexion 2x10              Ther Activity                           Gait Training                           Modalities

## 2025-03-24 ENCOUNTER — OFFICE VISIT (OUTPATIENT)
Dept: PHYSICAL THERAPY | Facility: CLINIC | Age: 65
End: 2025-03-24
Payer: COMMERCIAL

## 2025-03-24 DIAGNOSIS — Z98.890 S/P RIGHT ROTATOR CUFF REPAIR: ICD-10-CM

## 2025-03-24 DIAGNOSIS — M25.511 ACUTE PAIN OF RIGHT SHOULDER: Primary | ICD-10-CM

## 2025-03-24 PROCEDURE — 97110 THERAPEUTIC EXERCISES: CPT

## 2025-03-24 PROCEDURE — 97112 NEUROMUSCULAR REEDUCATION: CPT

## 2025-03-24 NOTE — PROGRESS NOTES
Daily Note     Today's date: 3/24/2025  Patient name: Efren Connell  : 1960  MRN: [id number]  Referring provider: Rio Coffey*  Dx:   Encounter Diagnosis     ICD-10-CM    1. Acute pain of right shoulder  M25.511       2. S/P right rotator cuff repair  Z98.890           Start Time: 1017  Stop Time: 1100  Total time in clinic (min): 43 minutes    Subjective: Pt states being out of his sling most of the day yesterday so his arm was sore but not painful. Pt arrives without his sling still feeling sore/heavy. Pt is seeing Dr. Coffey at the end of the week. No new complaints.       Objective: See treatment diary below      Assessment: Tolerated treatment well. Patient demonstrated fatigue post treatment, exhibited good technique with therapeutic exercises, and would benefit from continued PT. Added in supine GH flexion to 90 degrees to avoid scapular hiking, as well as SL GH ER AROM. Pt was sore afterward but denied any pain. These changes were added into his HEP based on his current timeline in his surgical protocol.       Plan: Continue per plan of care.  Progress treatment as tolerated.   Progress treament per protocol.         Insurance:  AMA/CMS Eval/ Re-eval Auth #/ Referral # Total units or visits Start date  Expiration date KX? Visit limitation?  PT only or  PT+OT? Co-Insurance   AMA 25 NO AUTH  25  90 PCY  $0    3/17/25             POC Start Date POC Expiration Date Signed POC?   25 pending   3/17/25 5/12/25 pending      Date               Visits/Units: NO AUTH Used               Authed: NO AUTH Remaining                   Precautions: RTC Repair Protocol  Access Code: [email or web address]  URL: [email or web address]  Date: 2025  Prepared by: Luis Armando Whittaker    Exercises  - Seated Scapular Retraction  - 3-4 x daily - 7 x weekly - 3 sets - 10 reps  - Seated Shoulder Flexion Towel Slide at Table Top  - 2-3 x daily - 7 x weekly - 2-3 sets - 10 reps - 3  hold  - Seated Elbow Flexion and Extension AROM  - 2-3 x daily - 7 x weekly - 3 sets - 10 reps  - Flexion-Extension Shoulder Pendulum with Table Support  - 2-3 x daily - 7 x weekly - 3 sets - 10 reps  - Supine Shoulder External Rotation with Dowel  - 2-3 x daily - 7 x weekly - 2-3 sets - 10 reps - 3 hold    Date 3/24 3/21 3/19 3/17 3/14   Visit Number 14 13 12 11 10   Manuals        Shoulder PROM  BT flex & ER BT flex & ER  BT performed all directions                           Neuro Re-Ed         Scap retr        RTC isos  2x10 ER & IR 2x10 ER & IR 1x10 ER & IR 1x15 ER & IR   Prone rows  2x10 2x10     SL ER W/VC 1x15       Supine GH AROM flex W/TC 1x15                       Ther Ex        pendulums  Fwd 30x   Circles 30x ea cw/ccw   Table slides     Table slides 3x10   Supine cane ER PROM 3x10 3x10 3x10  3x10   Elbow AROM        Supine GH flex PROM W/cane 2x10 W/cane 2x10   W/cane 1x10   Standing GH abd PROM 2x10 in mirror 2x10 in mirror 2x10 in mirror  2x10 in mirror   pulleys Flexion 3x10 Flexion 3x10 Flexion 3x10 Flexion 2x10 Flexion 2x10   BTB str w/SOS 2x10, 3s hold w/cane       Ther Activity                        Gait Training                        Modalities

## 2025-03-26 ENCOUNTER — OFFICE VISIT (OUTPATIENT)
Dept: PHYSICAL THERAPY | Facility: CLINIC | Age: 65
End: 2025-03-26
Payer: COMMERCIAL

## 2025-03-26 DIAGNOSIS — Z98.890 S/P RIGHT ROTATOR CUFF REPAIR: ICD-10-CM

## 2025-03-26 DIAGNOSIS — M25.511 ACUTE PAIN OF RIGHT SHOULDER: Primary | ICD-10-CM

## 2025-03-26 PROCEDURE — 97112 NEUROMUSCULAR REEDUCATION: CPT

## 2025-03-26 PROCEDURE — 97110 THERAPEUTIC EXERCISES: CPT

## 2025-03-26 NOTE — PROGRESS NOTES
Daily Note     Today's date: 3/26/2025  Patient name: Efren Connell  : 1960  MRN: [id number]  Referring provider: Rio Coffey*  Dx:   Encounter Diagnosis     ICD-10-CM    1. Acute pain of right shoulder  M25.511       2. S/P right rotator cuff repair  Z98.890           Start Time: 1019  Stop Time: 1059  Total time in clinic (min): 40 minutes    Subjective: Pt states feeling very tight this morning, states sleeping in his sling and has worn his sling all morning. Pt was feeling very good and loose last night after his last round of his HEP.       Objective: See treatment diary below      Assessment: Tolerated treatment well. Patient demonstrated fatigue post treatment, exhibited good technique with therapeutic exercises, and would benefit from continued PT. Pt educated his tightness is most likely from being in his sling for a prolonged time, informed to get out of his sling throughout the day.       Plan: Continue per plan of care.  Progress treatment as tolerated.          Insurance:  AMA/CMS Eval/ Re-eval Auth #/ Referral # Total units or visits Start date  Expiration date KX? Visit limitation?  PT only or  PT+OT? Co-Insurance   AMA 25 NO AUTH  25  90 PCY  $0    3/17/25             POC Start Date POC Expiration Date Signed POC?   25 pending   3/17/25 5/12/25 pending      Date               Visits/Units: NO AUTH Used               Authed: NO AUTH Remaining                   Precautions: RTC Repair Protocol  Access Code: [email or web address]  URL: [email or web address]  Date: 2025  Prepared by: Luis Armando Whittaker    Exercises  - Seated Scapular Retraction  - 3-4 x daily - 7 x weekly - 3 sets - 10 reps  - Seated Shoulder Flexion Towel Slide at Table Top  - 2-3 x daily - 7 x weekly - 2-3 sets - 10 reps - 3 hold  - Seated Elbow Flexion and Extension AROM  - 2-3 x daily - 7 x weekly - 3 sets - 10 reps  - Flexion-Extension Shoulder Pendulum with Table Support  -  2-3 x daily - 7 x weekly - 3 sets - 10 reps  - Supine Shoulder External Rotation with Dowel  - 2-3 x daily - 7 x weekly - 2-3 sets - 10 reps - 3 hold    Date 3/26 3/24 3/21 3/19 3/17 3/14   Visit Number 15 14 13 12 11 10   Manuals         Shoulder PROM   BT flex & ER BT flex & ER  BT performed all directions                              Neuro Re-Ed          Scap retr         RTC isos 2x10 ER & IR  2x10 ER & IR 2x10 ER & IR 1x10 ER & IR 1x15 ER & IR   Prone rows 2x10  2x10 2x10     SL ER W/VC 1x15 W/VC 1x15       Supine GH AROM flex W/TC 2x10 W/TC 1x15                         Ther Ex         pendulums Fwd 20x  Fwd 30x   Circles 30x ea cw/ccw   Table slides      Table slides 3x10   Supine cane ER PROM 3x10 3x10 3x10 3x10  3x10   Elbow AROM         Supine GH flex PROM W/cane 2x10 W/cane 2x10 W/cane 2x10   W/cane 1x10   Standing GH abd PROM  2x10 in mirror 2x10 in mirror 2x10 in mirror  2x10 in mirror   pulleys Flexion 3x10 Flexion 3x10 Flexion 3x10 Flexion 3x10 Flexion 2x10 Flexion 2x10   BTB str w/SOS 2x10, 3s hold 2x10, 3s hold w/cane       Ther Activity                           Gait Training                           Modalities

## 2025-03-28 ENCOUNTER — APPOINTMENT (OUTPATIENT)
Dept: PHYSICAL THERAPY | Facility: CLINIC | Age: 65
End: 2025-03-28
Payer: COMMERCIAL

## 2025-03-31 ENCOUNTER — OFFICE VISIT (OUTPATIENT)
Dept: PHYSICAL THERAPY | Facility: CLINIC | Age: 65
End: 2025-03-31
Payer: COMMERCIAL

## 2025-03-31 DIAGNOSIS — M25.511 ACUTE PAIN OF RIGHT SHOULDER: Primary | ICD-10-CM

## 2025-03-31 DIAGNOSIS — Z98.890 S/P RIGHT ROTATOR CUFF REPAIR: ICD-10-CM

## 2025-03-31 PROCEDURE — 97110 THERAPEUTIC EXERCISES: CPT

## 2025-03-31 PROCEDURE — 97112 NEUROMUSCULAR REEDUCATION: CPT

## 2025-03-31 NOTE — PROGRESS NOTES
Daily Note     Today's date: 3/31/2025  Patient name: Efren Connell  : 1960  MRN: [id number]  Referring provider: Rio Coffey*  Dx:   Encounter Diagnosis     ICD-10-CM    1. Acute pain of right shoulder  M25.511       2. S/P right rotator cuff repair  Z98.890           Start Time: 1016  Stop Time: 1055  Total time in clinic (min): 39 minutes    Subjective: Pt states his shoulder was very sore after his last session from the new AROM exercises with increased pain. Pt had his follow-up with Dr. Coffey this past Friday, was told he is doing well and to continue working into AROM and light strengthening.       Objective: See treatment diary below      Assessment: Tolerated treatment well. Patient demonstrated fatigue post treatment, exhibited good technique with therapeutic exercises, and would benefit from continued PT. Pt demonstrated improved mechanics, however still occasionally requires some VC and TC to reduce scapular hiking during flexion and abduction. HEP updated.       Plan: Continue per plan of care.  Progress treatment as tolerated.          Insurance:  AMA/CMS Eval/ Re-eval Auth #/ Referral # Total units or visits Start date  Expiration date KX? Visit limitation?  PT only or  PT+OT? Co-Insurance   AMA 25 NO AUTH  25  90 PCY  $0    3/17/25             POC Start Date POC Expiration Date Signed POC?   25 pending   3/17/25 5/12/25 pending      Date               Visits/Units: NO AUTH Used               Authed: NO AUTH Remaining                   Precautions: RTC Repair Protocol  Access Code: [email or web address]  URL: [email or web address]  Date: 2025  Prepared by: Luis Armando Whittaker    Exercises  - Seated Scapular Retraction  - 3-4 x daily - 7 x weekly - 3 sets - 10 reps  - Seated Shoulder Flexion Towel Slide at Table Top  - 2-3 x daily - 7 x weekly - 2-3 sets - 10 reps - 3 hold  - Seated Elbow Flexion and Extension AROM  - 2-3 x daily - 7 x weekly -  3 sets - 10 reps  - Flexion-Extension Shoulder Pendulum with Table Support  - 2-3 x daily - 7 x weekly - 3 sets - 10 reps  - Supine Shoulder External Rotation with Dowel  - 2-3 x daily - 7 x weekly - 2-3 sets - 10 reps - 3 hold    Date 3/31 3/26 3/24 3/21 3/19 3/17   Visit Number 16 15 14 13 12 11   Manuals         Shoulder PROM    BT flex & ER BT flex & ER                               Neuro Re-Ed          Scap retr Rows & ext YTB 1x15 ea        RTC isos  2x10 ER & IR  2x10 ER & IR 2x10 ER & IR 1x10 ER & IR   Prone rows  2x10  2x10 2x10    SL ER W/VC 1x15 W/VC 1x15 W/VC 1x15      Supine GH AROM flex W/TC 1x15 W/TC 2x10 W/TC 1x15      Supine ABC 1x @ 90 flex        Standing GH abd & ER 1x10 w/mirror and VC        Ther Ex         pendulums  Fwd 20x  Fwd 30x     Table slides         Supine cane ER PROM 3x10 3x10 3x10 3x10 3x10    Elbow AROM         Supine GH flex PROM W/cane 2x10 W/cane 2x10 W/cane 2x10 W/cane 2x10     Standing GH abd PROM   2x10 in mirror 2x10 in mirror 2x10 in mirror    pulleys  Flexion 3x10 Flexion 3x10 Flexion 3x10 Flexion 3x10 Flexion 2x10   BTB str w/SOS  2x10, 3s hold 2x10, 3s hold w/cane      Ther Activity                           Gait Training                           Modalities

## 2025-04-03 ENCOUNTER — OFFICE VISIT (OUTPATIENT)
Dept: PHYSICAL THERAPY | Facility: CLINIC | Age: 65
End: 2025-04-03
Payer: COMMERCIAL

## 2025-04-03 DIAGNOSIS — Z98.890 S/P RIGHT ROTATOR CUFF REPAIR: ICD-10-CM

## 2025-04-03 DIAGNOSIS — M25.511 ACUTE PAIN OF RIGHT SHOULDER: Primary | ICD-10-CM

## 2025-04-03 PROCEDURE — 97110 THERAPEUTIC EXERCISES: CPT

## 2025-04-03 PROCEDURE — 97112 NEUROMUSCULAR REEDUCATION: CPT

## 2025-04-03 NOTE — PROGRESS NOTES
Daily Note     Today's date: 4/3/2025  Patient name: Efren Connell  : 1960  MRN: [id number]  Referring provider: Rio Coffey*  Dx:   Encounter Diagnosis     ICD-10-CM    1. Acute pain of right shoulder  M25.511       2. S/P right rotator cuff repair  Z98.890           Start Time: 1404  Stop Time: 1442  Total time in clinic (min): 38 minutes    Subjective: Pt reports that his shoulder has had some moments where it was feeling pretty good and the exercises were getting easier. Pt continues to have stiffness and some pain if he overdoes it, but overall is doing well. Pt states feeling tight when he wakes up.       Objective: See treatment diary below      Assessment: Tolerated treatment well. Patient demonstrated fatigue post treatment, exhibited good technique with therapeutic exercises, and would benefit from continued PT. Progressed program based on his current timeline with the additions of prone horizontal abduction, no aggravation noted however it was difficult to go through most of the range.       Plan: Continue per plan of care.  Progress treatment as tolerated.          Insurance:  AMA/CMS Eval/ Re-eval Auth #/ Referral # Total units or visits Start date  Expiration date KX? Visit limitation?  PT only or  PT+OT? Co-Insurance   AMA 25 NO AUTH  25  90 PCY  $0    3/17/25             POC Start Date POC Expiration Date Signed POC?   25 pending   3/17/25 5/12/25 pending      Date               Visits/Units: NO AUTH Used               Authed: NO AUTH Remaining                   Precautions: RTC Repair Protocol  Access Code: [email or web address]  URL: [email or web address]  Date: 2025  Prepared by: Luis Armando Whittaker    Exercises  - Seated Scapular Retraction  - 3-4 x daily - 7 x weekly - 3 sets - 10 reps  - Seated Shoulder Flexion Towel Slide at Table Top  - 2-3 x daily - 7 x weekly - 2-3 sets - 10 reps - 3 hold  - Seated Elbow Flexion and Extension AROM  -  2-3 x daily - 7 x weekly - 3 sets - 10 reps  - Flexion-Extension Shoulder Pendulum with Table Support  - 2-3 x daily - 7 x weekly - 3 sets - 10 reps  - Supine Shoulder External Rotation with Dowel  - 2-3 x daily - 7 x weekly - 2-3 sets - 10 reps - 3 hold    Date 4/3 3/31 3/26 3/24 3/21 3/19   Visit Number 17 16 15 14 13 12   Manuals         Shoulder PROM     BT flex & ER BT flex & ER                              Neuro Re-Ed          Scap retr Rows & ext YTB 2x10 ea Rows & ext YTB 1x15 ea       RTC isos YTB ER 1x10  2x10 ER & IR  2x10 ER & IR 2x10 ER & IR   Prone I, T, Y 1x10 I, T only        Prone rows 2x10  2x10  2x10 2x10   SL ER W/VC 2x10 W/VC 1x15 W/VC 1x15 W/VC 1x15     Supine GH AROM flex W/VC 2x10 W/TC 1x15 W/TC 2x10 W/TC 1x15     Supine ABC 2x @90 flex 1x @ 90 flex       Standing GH abd & ER 1x10 w/mirror and VC 1x10 w/mirror and VC       Ther Ex         pendulums   Fwd 20x  Fwd 30x    Table slides         Supine cane ER PROM 2x10 3x10 3x10 3x10 3x10 3x10   Elbow AROM         Supine GH flex PROM UE supported 2x10 W/cane 2x10 W/cane 2x10 W/cane 2x10 W/cane 2x10    Standing GH abd PROM    2x10 in mirror 2x10 in mirror 2x10 in mirror   pulleys   Flexion 3x10 Flexion 3x10 Flexion 3x10 Flexion 3x10   BTB str w/SOS   2x10, 3s hold 2x10, 3s hold w/cane     Ther Activity                           Gait Training                           Modalities

## 2025-04-07 ENCOUNTER — OFFICE VISIT (OUTPATIENT)
Dept: PHYSICAL THERAPY | Facility: CLINIC | Age: 65
End: 2025-04-07
Payer: COMMERCIAL

## 2025-04-07 DIAGNOSIS — Z98.890 S/P RIGHT ROTATOR CUFF REPAIR: ICD-10-CM

## 2025-04-07 DIAGNOSIS — M25.511 ACUTE PAIN OF RIGHT SHOULDER: Primary | ICD-10-CM

## 2025-04-07 PROCEDURE — 97112 NEUROMUSCULAR REEDUCATION: CPT

## 2025-04-07 PROCEDURE — 97110 THERAPEUTIC EXERCISES: CPT

## 2025-04-07 NOTE — PROGRESS NOTES
Daily Note     Today's date: 2025  Patient name: Efren Connell  : 1960  MRN: [id number]  Referring provider: Rio Coffey*  Dx:   Encounter Diagnosis     ICD-10-CM    1. Acute pain of right shoulder  M25.511       2. S/P right rotator cuff repair  Z98.890           Start Time: 1020  Stop Time: 1100  Total time in clinic (min): 40 minutes    Subjective: Pt reports that his shoulder was pretty sore after stretching his shoulder a lot into ER. Pt feels his shoulder is moving more and things are getting easier. Pt was curious what he is allowed to lift/what is weight restriction is. No pain prior to the start of his session.       Objective: See treatment diary below      Assessment: Tolerated treatment well. Patient demonstrated fatigue post treatment, exhibited good technique with therapeutic exercises, and would benefit from continued PT      Plan: Continue per plan of care.  Progress treatment as tolerated.          Insurance:  AMA/CMS Eval/ Re-eval Auth #/ Referral # Total units or visits Start date  Expiration date KX? Visit limitation?  PT only or  PT+OT? Co-Insurance   AMA 25 NO AUTH  25  90 PCY  $0    3/17/25             POC Start Date POC Expiration Date Signed POC?   25 pending   3/17/25 5/12/25 pending      Date               Visits/Units: NO AUTH Used               Authed: NO AUTH Remaining                   Precautions: RTC Repair Protocol  Access Code: [email or web address]  URL: [email or web address]  Date: 2025  Prepared by: Luis Armando Whittaker    Exercises  - Seated Scapular Retraction  - 3-4 x daily - 7 x weekly - 3 sets - 10 reps  - Seated Shoulder Flexion Towel Slide at Table Top  - 2-3 x daily - 7 x weekly - 2-3 sets - 10 reps - 3 hold  - Seated Elbow Flexion and Extension AROM  - 2-3 x daily - 7 x weekly - 3 sets - 10 reps  - Flexion-Extension Shoulder Pendulum with Table Support  - 2-3 x daily - 7 x weekly - 3 sets - 10 reps  - Supine  Shoulder External Rotation with Dowel  - 2-3 x daily - 7 x weekly - 2-3 sets - 10 reps - 3 hold    Date 4/7 4/3 3/31 3/26 3/24 3/21   Visit Number 18 17 16 15 14 13   Manuals         Shoulder PROM      BT flex & ER                              Neuro Re-Ed          Scap retr  Rows & ext YTB 2x10 ea Rows & ext YTB 1x15 ea      RTC isos ER AROM in mirror 1x10    YTB ER & IR 1x10 YTB ER 1x10  2x10 ER & IR  2x10 ER & IR   Prone I, T, Y 2x10 I, T only 1x10 I, T only       Prone rows 2x10 2x10  2x10  2x10   SL ER W/TC 2x10    SL abd w/VC 2x10 W/VC 2x10 W/VC 1x15 W/VC 1x15 W/VC 1x15    Supine GH AROM flex  W/VC 2x10 W/TC 1x15 W/TC 2x10 W/TC 1x15    Supine ABC 1x @90 flex  1x @120 flex 2x @90 flex 1x @ 90 flex      Standing GH abd & ER  1x10 w/mirror and VC 1x10 w/mirror and VC      Ther Ex         pendulums    Fwd 20x  Fwd 30x   Table slides         Supine cane ER PROM 3x10 2x10 3x10 3x10 3x10 3x10   Elbow AROM         Supine GH flex PROM UE supported 2x10 UE supported 2x10 W/cane 2x10 W/cane 2x10 W/cane 2x10 W/cane 2x10   Standing GH abd PROM     2x10 in mirror 2x10 in mirror   pulleys    Flexion 3x10 Flexion 3x10 Flexion 3x10   BTB str w/SOS    2x10, 3s hold 2x10, 3s hold w/cane    Ther Activity                           Gait Training                           Modalities

## 2025-04-10 ENCOUNTER — APPOINTMENT (OUTPATIENT)
Dept: PHYSICAL THERAPY | Facility: CLINIC | Age: 65
End: 2025-04-10
Payer: COMMERCIAL

## 2025-04-10 ENCOUNTER — OFFICE VISIT (OUTPATIENT)
Dept: PHYSICAL THERAPY | Facility: CLINIC | Age: 65
End: 2025-04-10
Payer: COMMERCIAL

## 2025-04-10 DIAGNOSIS — Z98.890 S/P RIGHT ROTATOR CUFF REPAIR: ICD-10-CM

## 2025-04-10 DIAGNOSIS — M25.511 ACUTE PAIN OF RIGHT SHOULDER: Primary | ICD-10-CM

## 2025-04-10 PROCEDURE — 97112 NEUROMUSCULAR REEDUCATION: CPT

## 2025-04-10 PROCEDURE — 97110 THERAPEUTIC EXERCISES: CPT

## 2025-04-10 NOTE — PROGRESS NOTES
Daily Note     Today's date: 4/10/2025  Patient name: Efren Connell  : 1960  MRN: [id number]  Referring provider: Rio Coffey*  Dx:   Encounter Diagnosis     ICD-10-CM    1. Acute pain of right shoulder  M25.511       2. S/P right rotator cuff repair  Z98.890           Start Time: 1617  Stop Time: 1700  Total time in clinic (min): 43 minutes    Subjective: Pt states that his shoulder is doing well overall, he feels like it is getting stronger and he is moving better. Pt denies pain at rest prior to the start of his session. No new complaints.       Objective: See treatment diary below      Assessment: Tolerated treatment well. Patient demonstrated fatigue post treatment, exhibited good technique with therapeutic exercises, and would benefit from continued PT. HEP was updated based on his tolerance and good carryover from previous sessions. Pt demonstrates slight increase in his ROM recently and better strength.       Plan: Continue per plan of care.  Progress treatment as tolerated.          Insurance:  AMA/CMS Eval/ Re-eval Auth #/ Referral # Total units or visits Start date  Expiration date KX? Visit limitation?  PT only or  PT+OT? Co-Insurance   AMA 25 NO AUTH  25  90 PCY  $0    3/17/25             POC Start Date POC Expiration Date Signed POC?   25 pending   3/17/25 5/12/25 pending      Date               Visits/Units: NO AUTH Used               Authed: NO AUTH Remaining                   Precautions: RTC Repair Protocol  Access Code: [email or web address]  URL: [email or web address]  Date: 2025  Prepared by: Luis Armando Whittaker    Exercises  - Seated Scapular Retraction  - 3-4 x daily - 7 x weekly - 3 sets - 10 reps  - Seated Shoulder Flexion Towel Slide at Table Top  - 2-3 x daily - 7 x weekly - 2-3 sets - 10 reps - 3 hold  - Seated Elbow Flexion and Extension AROM  - 2-3 x daily - 7 x weekly - 3 sets - 10 reps  - Flexion-Extension Shoulder Pendulum with  Table Support  - 2-3 x daily - 7 x weekly - 3 sets - 10 reps  - Supine Shoulder External Rotation with Dowel  - 2-3 x daily - 7 x weekly - 2-3 sets - 10 reps - 3 hold    Date 4/10 4/7 4/3 3/31 3/26 3/24   Visit Number 19 18 17 16 15 14   Manuals         Shoulder PROM                                    Neuro Re-Ed          Scap retr [place] rows 4.1 3x10  Rows & ext YTB 2x10 ea Rows & ext YTB 1x15 ea     RTC isos RTB 2x10 ER & IR  ER AROM in mirror 1x10    YTB ER & IR 1x10 YTB ER 1x10  2x10 ER & IR    Prone I, T, Y I, T, Y 2x10 ea 2x10 I, T only 1x10 I, T only      Prone rows  2x10 2x10  2x10    Ball wall circles @90 flex 30x cw/ccw        SL ER  W/TC 2x10    SL abd w/VC 2x10 W/VC 2x10 W/VC 1x15 W/VC 1x15 W/VC 1x15   Supine GH AROM flex   W/VC 2x10 W/TC 1x15 W/TC 2x10 W/TC 1x15   Supine ABC 1x @90 flex  1x @ 120 flex 1x @90 flex  1x @120 flex 2x @90 flex 1x @ 90 flex     Standing GH abd & ER Flex/scap/abd 1x10 ea  1x10 w/mirror and VC 1x10 w/mirror and VC     Ther Ex         pendulums     Fwd 20x    Table slides         Supine cane ER PROM 3x10 3x10 2x10 3x10 3x10 3x10   Elbow AROM         Supine GH flex PROM W/cane 2x10 UE supported 2x10 UE supported 2x10 W/cane 2x10 W/cane 2x10 W/cane 2x10   Standing GH abd PROM      2x10 in mirror   pulleys     Flexion 3x10 Flexion 3x10   BTB str w/SOS     2x10, 3s hold 2x10, 3s hold w/cane   Ther Activity                           Gait Training                           Modalities

## 2025-04-14 ENCOUNTER — OFFICE VISIT (OUTPATIENT)
Dept: PHYSICAL THERAPY | Facility: CLINIC | Age: 65
End: 2025-04-14
Payer: COMMERCIAL

## 2025-04-14 DIAGNOSIS — Z98.890 S/P RIGHT ROTATOR CUFF REPAIR: ICD-10-CM

## 2025-04-14 DIAGNOSIS — M25.511 ACUTE PAIN OF RIGHT SHOULDER: Primary | ICD-10-CM

## 2025-04-14 PROCEDURE — 97112 NEUROMUSCULAR REEDUCATION: CPT

## 2025-04-14 NOTE — PROGRESS NOTES
Daily Note     Today's date: 2025  Patient name: Efren Connell  : 1960  MRN: [id number]  Referring provider: Rio Coffey*  Dx:   Encounter Diagnosis     ICD-10-CM    1. Acute pain of right shoulder  M25.511       2. S/P right rotator cuff repair  Z98.890           Start Time: 1021  Stop Time: 1101  Total time in clinic (min): 40 minutes    Subjective: Pt states he mowed his lawn this weekend with in his 0 turn mower and had no problems. Pt states overall doing well with his exercises, he has some discomfort and soreness with them but no sharp pains. No new complaints.       Objective: See treatment diary below      Assessment: Tolerated treatment well. Patient demonstrated fatigue post treatment, exhibited good technique with therapeutic exercises, and would benefit from continued PT. Pt felt slight anterior shoulder/biceps pain during some of his AROM exercises, given extra TC for scapular recruitment which helped relieve his discomfort.       Plan: Continue per plan of care.  Progress treatment as tolerated.          Insurance:  AMA/CMS Eval/ Re-eval Auth #/ Referral # Total units or visits Start date  Expiration date KX? Visit limitation?  PT only or  PT+OT? Co-Insurance   AMA 25 NO AUTH  25  90 PCY  $0    3/17/25             POC Start Date POC Expiration Date Signed POC?   25 pending   3/17/25 5/12/25 pending      Date               Visits/Units: NO AUTH Used               Authed: NO AUTH Remaining                   Precautions: RTC Repair Protocol  Access Code: [email or web address]  URL: [email or web address]  Date: 2025  Prepared by: Luis Armando Whittaker    Exercises  - Seated Scapular Retraction  - 3-4 x daily - 7 x weekly - 3 sets - 10 reps  - Seated Shoulder Flexion Towel Slide at Table Top  - 2-3 x daily - 7 x weekly - 2-3 sets - 10 reps - 3 hold  - Seated Elbow Flexion and Extension AROM  - 2-3 x daily - 7 x weekly - 3 sets - 10 reps  -  Flexion-Extension Shoulder Pendulum with Table Support  - 2-3 x daily - 7 x weekly - 3 sets - 10 reps  - Supine Shoulder External Rotation with Dowel  - 2-3 x daily - 7 x weekly - 2-3 sets - 10 reps - 3 hold    Date 4/14 4/10 4/7 4/3 3/31 3/26 3/24   Visit Number 20 19 18 17 16 15 14   Manuals          Shoulder PROM                                        Neuro Re-Ed           Scap retr  [place] rows 4.1 3x10  Rows & ext YTB 2x10 ea Rows & ext YTB 1x15 ea     RTC isos  RTB 2x10 ER & IR  ER AROM in mirror 1x10    YTB ER & IR 1x10 YTB ER 1x10  2x10 ER & IR    Prone I, T, Y I, T, Y 2x10 ea I, T, Y 2x10 ea 2x10 I, T only 1x10 I, T only      Prone rows 2x10  2x10 2x10  2x10    Ball wall circles @90 flex 30x cw/ccw @90 flex 30x cw/ccw        SL ER 2x10    SL abd 2x10  W/TC 2x10    SL abd w/VC 2x10 W/VC 2x10 W/VC 1x15 W/VC 1x15 W/VC 1x15   Supine GH AROM flex    W/VC 2x10 W/TC 1x15 W/TC 2x10 W/TC 1x15   Supine ABC  1x @90 flex  1x @ 120 flex 1x @90 flex  1x @120 flex 2x @90 flex 1x @ 90 flex     Standing GH abd & ER  Flex/scap/abd 1x10 ea  1x10 w/mirror and VC 1x10 w/mirror and VC     Ther Ex          pendulums      Fwd 20x    Table slides Wall slides 2x10         Supine cane ER PROM 2x10 3x10 3x10 2x10 3x10 3x10 3x10   Elbow AROM          Supine GH flex PROM W/cane 2x10 W/cane 2x10 UE supported 2x10 UE supported 2x10 W/cane 2x10 W/cane 2x10 W/cane 2x10   Standing GH abd PROM       2x10 in mirror   pulleys      Flexion 3x10 Flexion 3x10   BTB str w/SOS      2x10, 3s hold 2x10, 3s hold w/cane   Ther Activity                              Gait Training                              Modalities

## 2025-04-17 ENCOUNTER — OFFICE VISIT (OUTPATIENT)
Dept: PHYSICAL THERAPY | Facility: CLINIC | Age: 65
End: 2025-04-17
Payer: COMMERCIAL

## 2025-04-17 DIAGNOSIS — M25.511 ACUTE PAIN OF RIGHT SHOULDER: Primary | ICD-10-CM

## 2025-04-17 DIAGNOSIS — Z98.890 S/P RIGHT ROTATOR CUFF REPAIR: ICD-10-CM

## 2025-04-17 PROCEDURE — 97112 NEUROMUSCULAR REEDUCATION: CPT

## 2025-04-17 PROCEDURE — 97140 MANUAL THERAPY 1/> REGIONS: CPT

## 2025-04-17 NOTE — PROGRESS NOTES
Daily Note     Today's date: 2025  Patient name: Efren Connell  : 1960  MRN: [id number]  Referring provider: Rio Coffey*  Dx:   Encounter Diagnosis     ICD-10-CM    1. Acute pain of right shoulder  M25.511       2. S/P right rotator cuff repair  Z98.890           Start Time: 1019  Stop Time: 1058  Total time in clinic (min): 39 minutes    Subjective: Pt states that his shoulder has bothering him a little more and hurting more over the past few days. Pt also feels a little stiffer today. Pt states he was continuing to do his exercises even with some pain.       Objective: See treatment diary below      Assessment: Tolerated treatment well. Patient demonstrated fatigue post treatment, exhibited good technique with therapeutic exercises, and would benefit from continued PT. Pt instructed not to overdo it with his exercises as he may be overfatiguing his RTC leading to excessive compensations and pain in his biceps and anterior shoulder. Pt felt decreased pain during the session as he was regressed slightly to mostly PROM and light AROM.       Plan: Continue per plan of care.  Progress treatment as tolerated.          Insurance:  AMA/CMS Eval/ Re-eval Auth #/ Referral # Total units or visits Start date  Expiration date KX? Visit limitation?  PT only or  PT+OT? Co-Insurance   AMA 25 NO AUTH  25  90 PCY  $0    3/17/25             POC Start Date POC Expiration Date Signed POC?   25 pending   3/17/25 5/12/25 pending      Date               Visits/Units: NO AUTH Used               Authed: NO AUTH Remaining                   Precautions: RTC Repair Protocol  Access Code: [email or web address]  URL: [email or web address]  Date: 2025  Prepared by: Luis Armando Whittaker    Exercises  - Seated Scapular Retraction  - 3-4 x daily - 7 x weekly - 3 sets - 10 reps  - Seated Shoulder Flexion Towel Slide at Table Top  - 2-3 x daily - 7 x weekly - 2-3 sets - 10 reps - 3 hold  -  Seated Elbow Flexion and Extension AROM  - 2-3 x daily - 7 x weekly - 3 sets - 10 reps  - Flexion-Extension Shoulder Pendulum with Table Support  - 2-3 x daily - 7 x weekly - 3 sets - 10 reps  - Supine Shoulder External Rotation with Dowel  - 2-3 x daily - 7 x weekly - 2-3 sets - 10 reps - 3 hold    Date 4/17 4/14 4/10 4/7 4/3 3/31   Visit Number 21 20 19 18 17 16   Manuals         Shoulder PROM All directions                                   Neuro Re-Ed          Scap retr   [place] rows 4.1 3x10  Rows & ext YTB 2x10 ea Rows & ext YTB 1x15 ea   RTC isos ER AROM in mirror 2x10  RTB 2x10 ER & IR  ER AROM in mirror 1x10    YTB ER & IR 1x10 YTB ER 1x10    Prone I, T, Y I 2x10 I, T, Y 2x10 ea I, T, Y 2x10 ea 2x10 I, T only 1x10 I, T only    Prone rows 2x10 2x10  2x10 2x10    Ball wall circles  @90 flex 30x cw/ccw @90 flex 30x cw/ccw      SL ER  2x10    SL abd 2x10  W/TC 2x10    SL abd w/VC 2x10 W/VC 2x10 W/VC 1x15   Supine GH AROM flex     W/VC 2x10 W/TC 1x15   Supine ABC 1x @90 flex  1x @120 flex  1x @90 flex  1x @ 120 flex 1x @90 flex  1x @120 flex 2x @90 flex 1x @ 90 flex   Standing GH abd & ER   Flex/scap/abd 1x10 ea  1x10 w/mirror and VC 1x10 w/mirror and VC   Ther Ex         pendulums         Table slides  Wall slides 2x10       Supine cane ER PROM 3x10 2x10 3x10 3x10 2x10 3x10   Elbow AROM         Supine GH flex PROM 3x10 W/cane 2x10 W/cane 2x10 UE supported 2x10 UE supported 2x10 W/cane 2x10   Standing GH abd PROM         pulleys         BTB str w/SOS         Ther Activity                           Gait Training                           Modalities

## 2025-04-21 ENCOUNTER — EVALUATION (OUTPATIENT)
Dept: PHYSICAL THERAPY | Facility: CLINIC | Age: 65
End: 2025-04-21
Payer: COMMERCIAL

## 2025-04-21 DIAGNOSIS — Z98.890 S/P RIGHT ROTATOR CUFF REPAIR: ICD-10-CM

## 2025-04-21 DIAGNOSIS — M25.511 ACUTE PAIN OF RIGHT SHOULDER: Primary | ICD-10-CM

## 2025-04-21 PROCEDURE — 97110 THERAPEUTIC EXERCISES: CPT

## 2025-04-21 NOTE — PROGRESS NOTES
PT Re-Evaluation     Today's date: 2025  Patient name: Efren Connell  : 1960  MRN: [id number]  Referring provider: Rio Coffey*  Dx:   Encounter Diagnosis     ICD-10-CM    1. Acute pain of right shoulder  M25.511       2. S/P right rotator cuff repair  Z98.890               Start Time: 1402  Stop Time: 1443  Total time in clinic (min): 41 minutes    Assessment  Impairments: abnormal coordination, abnormal muscle tone, abnormal or restricted ROM, abnormal movement, activity intolerance, impaired physical strength, pain with function, poor posture , poor body mechanics, unable to perform ADL, participation limitations, activity limitations and endurance  Symptom irritability: moderate    Assessment details: Pt is a 64 y.o male who presents to skilled physical therapy for his 22nd visit, less than 3 months s/p R RTC repair and shoulder debridement, performed on 25. Pt arrives without his sling and fair posture with rounded shoulders, increased R shoulder A/PROM, increased elbow AROM, and is no longer TTP along his anterior R shoulder. Pt also demonstrated increased R shoulder MMT, however hiking will occasionally occur with reaching over shoulder height. Pt continues to progress through his surgical protocol, however ROM, strength deficits persist as well as mechanical deficits. Pt's continued pain and deficits are preventing him from performing self care, ADLs, recreational activities, and work duties without compensations. Pt was re-educated on his diagnosis, prognosis, POC, HEP, and surgical precautions/contraindications. Pt would continue to benefit from skilled physical therapy to reduce his pain, address his deficits, progress towards his goals, work through his post-surgical protocol, and return to his PLOF.   Understanding of Dx/Px/POC: good     Prognosis: good    Goals  Short Term Goals:  1) Pt will initiate and progress his HEP in 3-4 weeks.- Met  2) Pt will improve his sitting  and standing posture to good in 3-4 weeks.- Progressing  3) Pt will improve his PROM shoulder flexion to at least 90 degrees to improve self care in 3-4 weeks.- Met  4) Pt will initiate AROM shoulder exercises in 3-4 weeks to improve his ADLs.- Met    Long Term Goals:  1) Pt will be able to sleep for 6-8 hours without shoulder pain in 6-8 weeks.- Progressing  2) Pt will be able to get dressed with less than 2/10 pain in 6-8 weeks.- Met  3) Pt will be able to put on a coat with less than 2/10 apin in 6-8 weeks.- Progressing  4) Pt will be able to reach a shelf at or above shoulder height with less than 3/10 pain in 8-12 weeks.- Progressing  5) Pt will be able to perform his ADLs with less than 3/10 pain in 8-12 weeks.- Progressing    Plan  Patient would benefit from: skilled physical therapy and PT eval  Planned modality interventions: cryotherapy and thermotherapy: hydrocollator packs    Planned therapy interventions: activity modification, joint mobilization, ADL retraining, manual therapy, body mechanics training, motor coordination training, neuromuscular re-education, patient/caregiver education, coordination, postural training, strengthening, stretching, therapeutic activities, flexibility, functional ROM exercises, graded exercise and home exercise program    Frequency: 2x week  Duration in weeks: 6  Plan of Care beginning date: 4/21/2025  Plan of Care expiration date: 6/2/2025  Treatment plan discussed with: patient        Subjective Evaluation    History of Present Illness  Date of surgery: 2/6/2025  Mechanism of injury: surgery  Mechanism of injury: 4/21/25 Update:  Pt states it is still difficult reaching up overhead without discomfort/pain. Pt has been sleeping fine at night without pain. He is still in his recliner sleeping after he tried sleeping in his bed and it was painful. Pt states its easy getting dressed, showering, etc. Pt still isn't lifting anything heavy. Pt occasionally gets twinges of pain,  even at rest but overall is doing better. Pt was a little concerned with the pain he has been having.   Patient Goals  Patient goals for therapy: decreased pain, increased motion, increased strength, return to sport/leisure activities, independence with ADLs/IADLs and return to work  Patient goal: lift overhead  Pain  Current pain ratin  At best pain ratin  At worst pain ratin (AROM flexion)  Location: anterior R shoulder  Quality: tight and dull ache  Relieving factors: medications and ice  Aggravating factors: stair climbing, lifting and overhead activity  Progression: improved    Social Support  Steps to enter house: yes  Stairs in house: yes   Lives in: multiple-level home  Lives with: spouse    Employment status: working  Hand dominance: right  Exercise history: pendulums          Objective     Postural Observations  Seated posture: fair  Standing posture: fair  Correction of posture: has no consistent effect      Tenderness     Right Shoulder  No tenderness in the AC joint, acromion, biceps tendon (proximal), infraspinatus tendon and supraspinatus tendon.     Active Range of Motion     Right Shoulder   Flexion: 96 (minor pain) degrees   Abduction: 110 (sore) degrees   External rotation BTH: C6   Internal rotation BTB: Active internal rotation behind the back: sacrum.     Right Elbow   Flexion: WFL  Extension: WFL    Passive Range of Motion     Right Shoulder   Flexion: 133 degrees   Abduction: 105 (tight) degrees   External rotation 45°: 59 (tight/almost painful) degrees   Internal rotation 45°: 45 degrees     Strength/Myotome Testing     Left Shoulder     Planes of Motion   Flexion: 4+   Abduction: 4+     Right Shoulder     Planes of Motion   Flexion: 3-   Abduction: 3-   External rotation at 0°: 3+   Internal rotation at 0°: 3+                 Insurance:  AMA/CMS Eval/ Re-eval Auth #/ Referral # Total units or visits Start date  Expiration date KX? Visit limitation?  PT only or  PT+OT?  Co-Insurance   AMA 2/20/25 NO AUTH  2/20/25 12/31/25  90 PCY  $0    3/17/25            4/21/25             POC Start Date POC Expiration Date Signed POC?   2/20/25 4/17/25 pending   3/17/25 5/12/25 pending   4/21/25 6/2/25 pending      Date               Visits/Units: NO AUTH Used               Authed: NO AUTH Remaining                   Precautions: RTC Repair Protocol  Access Code: [email or web address]  URL: [email or web address]  Date: 02/20/2025  Prepared by: Luis Armando Whittaker    Exercises  - Seated Scapular Retraction  - 3-4 x daily - 7 x weekly - 3 sets - 10 reps  - Seated Shoulder Flexion Towel Slide at Table Top  - 2-3 x daily - 7 x weekly - 2-3 sets - 10 reps - 3 hold  - Seated Elbow Flexion and Extension AROM  - 2-3 x daily - 7 x weekly - 3 sets - 10 reps  - Flexion-Extension Shoulder Pendulum with Table Support  - 2-3 x daily - 7 x weekly - 3 sets - 10 reps  - Supine Shoulder External Rotation with Dowel  - 2-3 x daily - 7 x weekly - 2-3 sets - 10 reps - 3 hold    Date 4/21 4/17 4/14 4/10 4/7 4/3   Visit Number 22 21 20 19 18 17   Manuals         Shoulder PROM  All directions                                  Neuro Re-Ed          Scap retr    Anisha rows 4.1 3x10  Rows & ext YTB 2x10 ea   RTC isos  ER AROM in mirror 2x10  RTB 2x10 ER & IR  ER AROM in mirror 1x10    YTB ER & IR 1x10 YTB ER 1x10   Prone I, T, Y  I 2x10 I, T, Y 2x10 ea I, T, Y 2x10 ea 2x10 I, T only 1x10 I, T only   Prone rows  2x10 2x10  2x10 2x10   Ball wall circles   @90 flex 30x cw/ccw @90 flex 30x cw/ccw     SL ER   2x10    SL abd 2x10  W/TC 2x10    SL abd w/VC 2x10 W/VC 2x10   Supine GH AROM flex      W/VC 2x10   Supine ABC  1x @90 flex  1x @120 flex  1x @90 flex  1x @ 120 flex 1x @90 flex  1x @120 flex 2x @90 flex   Standing GH abd & ER    Flex/scap/abd 1x10 ea  1x10 w/mirror and VC   Ther Ex         pendulums         Table slides   Wall slides 2x10      Supine cane ER PROM  3x10 2x10 3x10 3x10 2x10   Elbow AROM         Supine GH flex  PROM  3x10 W/cane 2x10 W/cane 2x10 UE supported 2x10 UE supported 2x10   Standing GH abd PROM         pulleys         BTB str w/SOS         Ther Activity                           Gait Training                           Modalities

## 2025-04-21 NOTE — LETTER
2025    Rio Coffey MD  [address] [place] [id number]    Patient: Efren Connell   YOB: 1960   Date of Visit: 2025     Encounter Diagnosis     ICD-10-CM    1. Acute pain of right shoulder  M25.511       2. S/P right rotator cuff repair  Z98.890           Dear Dr. Rio Coffey MD:    Thank you for your recent referral of Efren Connell. Please review the attached evaluation summary from Efren's recent visit.     Please verify that you agree with the plan of care by signing the attached order.     If you have any questions or concerns, please do not hesitate to call.     I sincerely appreciate the opportunity to share in the care of one of your patients and hope to have another opportunity to work with you in the near future.       Sincerely,    Luis Armando Whittaker, PT      Referring Provider:      I certify that I have read the below Plan of Care and certify the need for these services furnished under this plan of treatment while under my care.                    Rio Coffey MD  [address] [place] [address]  Via Fax: [phone]          PT Re-Evaluation     Today's date: 2025  Patient name: Efren Connell  : 1960  MRN: [id number]  Referring provider: Rio Coffey*  Dx:   Encounter Diagnosis     ICD-10-CM    1. Acute pain of right shoulder  M25.511       2. S/P right rotator cuff repair  Z98.890               Start Time: 1402  Stop Time: 1443  Total time in clinic (min): 41 minutes    Assessment  Impairments: abnormal coordination, abnormal muscle tone, abnormal or restricted ROM, abnormal movement, activity intolerance, impaired physical strength, pain with function, poor posture , poor body mechanics, unable to perform ADL, participation limitations, activity limitations and endurance  Symptom irritability: moderate    Assessment details: Pt is a 64 y.o male who presents to  skilled physical therapy for his 22nd visit, less than 3 months s/p R RTC repair and shoulder debridement, performed on 2/6/25. Pt arrives without his sling and fair posture with rounded shoulders, increased R shoulder A/PROM, increased elbow AROM, and is no longer TTP along his anterior R shoulder. Pt also demonstrated increased R shoulder MMT, however hiking will occasionally occur with reaching over shoulder height. Pt continues to progress through his surgical protocol, however ROM, strength deficits persist as well as mechanical deficits. Pt's continued pain and deficits are preventing him from performing self care, ADLs, recreational activities, and work duties without compensations. Pt was re-educated on his diagnosis, prognosis, POC, HEP, and surgical precautions/contraindications. Pt would continue to benefit from skilled physical therapy to reduce his pain, address his deficits, progress towards his goals, work through his post-surgical protocol, and return to his PLOF.   Understanding of Dx/Px/POC: good     Prognosis: good    Goals  Short Term Goals:  1) Pt will initiate and progress his HEP in 3-4 weeks.- Met  2) Pt will improve his sitting and standing posture to good in 3-4 weeks.- Progressing  3) Pt will improve his PROM shoulder flexion to at least 90 degrees to improve self care in 3-4 weeks.- Met  4) Pt will initiate AROM shoulder exercises in 3-4 weeks to improve his ADLs.- Met    Long Term Goals:  1) Pt will be able to sleep for 6-8 hours without shoulder pain in 6-8 weeks.- Progressing  2) Pt will be able to get dressed with less than 2/10 pain in 6-8 weeks.- Met  3) Pt will be able to put on a coat with less than 2/10 apin in 6-8 weeks.- Progressing  4) Pt will be able to reach a shelf at or above shoulder height with less than 3/10 pain in 8-12 weeks.- Progressing  5) Pt will be able to perform his ADLs with less than 3/10 pain in 8-12 weeks.- Progressing    Plan  Patient would benefit from:  skilled physical therapy and PT eval  Planned modality interventions: cryotherapy and thermotherapy: hydrocollator packs    Planned therapy interventions: activity modification, joint mobilization, ADL retraining, manual therapy, body mechanics training, motor coordination training, neuromuscular re-education, patient/caregiver education, coordination, postural training, strengthening, stretching, therapeutic activities, flexibility, functional ROM exercises, graded exercise and home exercise program    Frequency: 2x week  Duration in weeks: 6  Plan of Care beginning date: 2025  Plan of Care expiration date: 2025  Treatment plan discussed with: patient        Subjective Evaluation    History of Present Illness  Date of surgery: 2025  Mechanism of injury: surgery  Mechanism of injury: 25 Update:  Pt states it is still difficult reaching up overhead without discomfort/pain. Pt has been sleeping fine at night without pain. He is still in his recliner sleeping after he tried sleeping in his bed and it was painful. Pt states its easy getting dressed, showering, etc. Pt still isn't lifting anything heavy. Pt occasionally gets twinges of pain, even at rest but overall is doing better. Pt was a little concerned with the pain he has been having.   Patient Goals  Patient goals for therapy: decreased pain, increased motion, increased strength, return to sport/leisure activities, independence with ADLs/IADLs and return to work  Patient goal: lift overhead  Pain  Current pain ratin  At best pain ratin  At worst pain ratin (AROM flexion)  Location: anterior R shoulder  Quality: tight and dull ache  Relieving factors: medications and ice  Aggravating factors: stair climbing, lifting and overhead activity  Progression: improved    Social Support  Steps to enter house: yes  Stairs in house: yes   Lives in: multiple-level home  Lives with: spouse    Employment status: working  Hand dominance:  right  Exercise history: pendulums          Objective     Postural Observations  Seated posture: fair  Standing posture: fair  Correction of posture: has no consistent effect      Tenderness     Right Shoulder  No tenderness in the AC joint, acromion, biceps tendon (proximal), infraspinatus tendon and supraspinatus tendon.     Active Range of Motion     Right Shoulder   Flexion: 96 (minor pain) degrees   Abduction: 110 (sore) degrees   External rotation BTH: C6   Internal rotation BTB: Active internal rotation behind the back: sacrum.     Right Elbow   Flexion: WFL  Extension: WFL    Passive Range of Motion     Right Shoulder   Flexion: 133 degrees   Abduction: 105 (tight) degrees   External rotation 45°: 59 (tight/almost painful) degrees   Internal rotation 45°: 45 degrees     Strength/Myotome Testing     Left Shoulder     Planes of Motion   Flexion: 4+   Abduction: 4+     Right Shoulder     Planes of Motion   Flexion: 3-   Abduction: 3-   External rotation at 0°: 3+   Internal rotation at 0°: 3+                 Insurance:  AMA/CMS Eval/ Re-eval Auth #/ Referral # Total units or visits Start date  Expiration date KX? Visit limitation?  PT only or  PT+OT? Co-Insurance   AMA 2/20/25 NO AUTH  2/20/25 12/31/25  90 PCY  $0    3/17/25            4/21/25             POC Start Date POC Expiration Date Signed POC?   2/20/25 4/17/25 pending   3/17/25 5/12/25 pending   4/21/25 6/2/25 pending      Date               Visits/Units: NO AUTH Used               Authed: NO AUTH Remaining                   Precautions: RTC Repair Protocol  Access Code: [email or web address]  URL: [email or web address]  Date: 02/20/2025  Prepared by: Luis Armando Whittaker    Exercises  - Seated Scapular Retraction  - 3-4 x daily - 7 x weekly - 3 sets - 10 reps  - Seated Shoulder Flexion Towel Slide at Table Top  - 2-3 x daily - 7 x weekly - 2-3 sets - 10 reps - 3 hold  - Seated Elbow Flexion and Extension AROM  - 2-3 x daily - 7 x weekly - 3 sets - 10  reps  - Flexion-Extension Shoulder Pendulum with Table Support  - 2-3 x daily - 7 x weekly - 3 sets - 10 reps  - Supine Shoulder External Rotation with Dowel  - 2-3 x daily - 7 x weekly - 2-3 sets - 10 reps - 3 hold    Date 4/21 4/17 4/14 4/10 4/7 4/3   Visit Number 22 21 20 19 18 17   Manuals         Shoulder PROM  All directions                                  Neuro Re-Ed          Scap retr    Anisha rows 4.1 3x10  Rows & ext YTB 2x10 ea   RTC isos  ER AROM in mirror 2x10  RTB 2x10 ER & IR  ER AROM in mirror 1x10    YTB ER & IR 1x10 YTB ER 1x10   Prone I, T, Y  I 2x10 I, T, Y 2x10 ea I, T, Y 2x10 ea 2x10 I, T only 1x10 I, T only   Prone rows  2x10 2x10  2x10 2x10   Ball wall circles   @90 flex 30x cw/ccw @90 flex 30x cw/ccw     SL ER   2x10    SL abd 2x10  W/TC 2x10    SL abd w/VC 2x10 W/VC 2x10   Supine GH AROM flex      W/VC 2x10   Supine ABC  1x @90 flex  1x @120 flex  1x @90 flex  1x @ 120 flex 1x @90 flex  1x @120 flex 2x @90 flex   Standing GH abd & ER    Flex/scap/abd 1x10 ea  1x10 w/mirror and VC   Ther Ex         pendulums         Table slides   Wall slides 2x10      Supine cane ER PROM  3x10 2x10 3x10 3x10 2x10   Elbow AROM         Supine GH flex PROM  3x10 W/cane 2x10 W/cane 2x10 UE supported 2x10 UE supported 2x10   Standing GH abd PROM         pulleys         BTB str w/SOS         Ther Activity                           Gait Training                           Modalities

## 2025-04-24 ENCOUNTER — OFFICE VISIT (OUTPATIENT)
Dept: PHYSICAL THERAPY | Facility: CLINIC | Age: 65
End: 2025-04-24
Payer: COMMERCIAL

## 2025-04-24 DIAGNOSIS — Z98.890 S/P RIGHT ROTATOR CUFF REPAIR: ICD-10-CM

## 2025-04-24 DIAGNOSIS — M25.511 ACUTE PAIN OF RIGHT SHOULDER: Primary | ICD-10-CM

## 2025-04-24 PROCEDURE — 97110 THERAPEUTIC EXERCISES: CPT

## 2025-04-24 PROCEDURE — 97112 NEUROMUSCULAR REEDUCATION: CPT

## 2025-04-24 PROCEDURE — 97140 MANUAL THERAPY 1/> REGIONS: CPT

## 2025-04-24 NOTE — PROGRESS NOTES
Daily Note     Today's date: 2025  Patient name: Efren Connell  : 1960  MRN: [id number]  Referring provider: Rio Coffey*  Dx:   Encounter Diagnosis     ICD-10-CM    1. Acute pain of right shoulder  M25.511       2. S/P right rotator cuff repair  Z98.890           Start Time: 1018  Stop Time: 1059  Total time in clinic (min): 41 minutes    Subjective: Pt states his shoulder continues to bother him with a constant ache. Pt states continuing his exercises and depending on the exercise would push into pain as he felt that was       Objective: See treatment diary below      Assessment: Tolerated treatment fair. Patient demonstrated fatigue post treatment, exhibited good technique with therapeutic exercises, and would benefit from continued PT. Pt educated not to push into pain with his exercises as this can cause increases in inflammation and can irritate his RTC repair. Due to the constant pain, pt was told to take 1-2 days off of his exercises to let his shoulder recover and then slowly progress through his A/PROM as long as there is no pain. He also needs to continue focusing on reducing scapular hiking when performing AROM.       Plan: Continue per plan of care.  Progress treatment as tolerated.          Insurance:  AMA/CMS Eval/ Re-eval Auth #/ Referral # Total units or visits Start date  Expiration date KX? Visit limitation?  PT only or  PT+OT? Co-Insurance   AMA 25 NO AUTH  25  90 PCY  $0    3/17/25            4/21/25             POC Start Date POC Expiration Date Signed POC?   25 pending   3/17/25 5/12/25 pending   25 pending      Date               Visits/Units: NO AUTH Used               Authed: NO AUTH Remaining                   Precautions: RTC Repair Protocol  Access Code: [email or web address]  URL: [email or web address]  Date: 2025  Prepared by: Luis Armando Whittaker    Exercises  - Seated Scapular Retraction  - 3-4 x daily - 7 x  weekly - 3 sets - 10 reps  - Seated Shoulder Flexion Towel Slide at Table Top  - 2-3 x daily - 7 x weekly - 2-3 sets - 10 reps - 3 hold  - Seated Elbow Flexion and Extension AROM  - 2-3 x daily - 7 x weekly - 3 sets - 10 reps  - Flexion-Extension Shoulder Pendulum with Table Support  - 2-3 x daily - 7 x weekly - 3 sets - 10 reps  - Supine Shoulder External Rotation with Dowel  - 2-3 x daily - 7 x weekly - 2-3 sets - 10 reps - 3 hold    Date 4/24 4/21 4/17 4/14 4/10 4/7   Visit Number 23 22 21 20 19 18   Manuals         Shoulder PROM All directions  All directions      GH mobilizations AP gr. I-II                          Neuro Re-Ed          Scap retr     [place] rows 4.1 3x10    RTC isos   ER AROM in mirror 2x10  RTB 2x10 ER & IR  ER AROM in mirror 1x10    YTB ER & IR 1x10   Prone I, T, Y   I 2x10 I, T, Y 2x10 ea I, T, Y 2x10 ea 2x10 I, T only   Prone rows   2x10 2x10  2x10   Ball wall circles    @90 flex 30x cw/ccw @90 flex 30x cw/ccw    SL ER 2x10    SL abd 2x10   2x10    SL abd 2x10  W/TC 2x10    SL abd w/VC 2x10   Supine GH AROM flex         Supine ABC   1x @90 flex  1x @120 flex  1x @90 flex  1x @ 120 flex 1x @90 flex  1x @120 flex   Standing GH abd & ER     Flex/scap/abd 1x10 ea    Ther Ex         pendulums         Table slides    Wall slides 2x10     Supine cane ER PROM 2x10  3x10 2x10 3x10 3x10   Elbow AROM         Supine GH flex PROM W/opp UE 2x10  3x10 W/cane 2x10 W/cane 2x10 UE supported 2x10   Standing GH abd PROM         pulleys         BTB str w/SOS         Ther Activity                           Gait Training                           Modalities

## 2025-04-28 ENCOUNTER — OFFICE VISIT (OUTPATIENT)
Dept: PHYSICAL THERAPY | Facility: CLINIC | Age: 65
End: 2025-04-28
Payer: COMMERCIAL

## 2025-04-28 DIAGNOSIS — M25.511 ACUTE PAIN OF RIGHT SHOULDER: Primary | ICD-10-CM

## 2025-04-28 DIAGNOSIS — Z98.890 S/P RIGHT ROTATOR CUFF REPAIR: ICD-10-CM

## 2025-04-28 PROCEDURE — 97140 MANUAL THERAPY 1/> REGIONS: CPT

## 2025-04-28 PROCEDURE — 97110 THERAPEUTIC EXERCISES: CPT

## 2025-04-28 NOTE — PROGRESS NOTES
Daily Note     Today's date: 2025  Patient name: Efren Connell  : 1960  MRN: [id number]  Referring provider: Rio Coffey*  Dx:   Encounter Diagnosis     ICD-10-CM    1. Acute pain of right shoulder  M25.511       2. S/P right rotator cuff repair  Z98.890           Start Time: 1017  Stop Time: 1100  Total time in clinic (min): 43 minutes    Subjective: Pt states that he took a few days off of his exercises as instructed and felt his shoulder was feeling a lot better. However, yesterday he went to open his screen door, felt a twinge of pain that went away, but now feels like a constant ache in his shoulder down his arm into his hand. Pt sees Dr. Coffey tomorrow for a follow-up. Pt has been taking Advil for the pain.       Objective: See treatment diary below      Assessment: Tolerated treatment fair. Patient demonstrated fatigue post treatment, exhibited good technique with therapeutic exercises, and would benefit from continued PT. Pt demonstrated increased R shoulder tightness compared to his previous session. Pt had reduction of pain with joint mobilizations. Pt does have pain tenderness at lateral R elbow along wrist extensor origin, may be dealing with lateral epicondylitis of his R elbow. Pt instructed to continue gentle A/PROM to work on mobility in pain free ranges until further assessed by Dr. Coffey. With his increase in pain and slight reduction of ROM without incident of injury, pt may also be dealing with adhesive capsulitis that has progressively begun over the last 1-2 weeks.       Plan: Continue per plan of care.  Progress treatment as tolerated.          Insurance:  AMA/CMS Eval/ Re-eval Auth #/ Referral # Total units or visits Start date  Expiration date KX? Visit limitation?  PT only or  PT+OT? Co-Insurance   AMA 25 NO AUTH  25  90 PCY  $0    3/17/25            4/21/25             POC Start Date POC Expiration Date Signed POC?   25  pending   3/17/25 5/12/25 pending   4/21/25 6/2/25 pending      Date               Visits/Units: NO AUTH Used               Authed: NO AUTH Remaining                   Precautions: RTC Repair Protocol  Access Code: [email or web address]  URL: [email or web address]  Date: 02/20/2025  Prepared by: Luis Armando Whittaker    Exercises  - Seated Scapular Retraction  - 3-4 x daily - 7 x weekly - 3 sets - 10 reps  - Seated Shoulder Flexion Towel Slide at Table Top  - 2-3 x daily - 7 x weekly - 2-3 sets - 10 reps - 3 hold  - Seated Elbow Flexion and Extension AROM  - 2-3 x daily - 7 x weekly - 3 sets - 10 reps  - Flexion-Extension Shoulder Pendulum with Table Support  - 2-3 x daily - 7 x weekly - 3 sets - 10 reps  - Supine Shoulder External Rotation with Dowel  - 2-3 x daily - 7 x weekly - 2-3 sets - 10 reps - 3 hold    Date 4/28 4/24 4/21 4/17 4/14 4/10   Visit Number 24 23 22 21 20 19   Manuals         Shoulder PROM All directions All directions  All directions     GH mobilizations AP gr. I-II  Inf gr. I-II AP gr. I-II                         Neuro Re-Ed          Scap retr      Anisha rows 4.1 3x10   RTC isos    ER AROM in mirror 2x10  RTB 2x10 ER & IR    Prone I, T, Y    I 2x10 I, T, Y 2x10 ea I, T, Y 2x10 ea   Prone rows    2x10 2x10    Ball wall circles     @90 flex 30x cw/ccw @90 flex 30x cw/ccw   SL ER  2x10    SL abd 2x10   2x10    SL abd 2x10    Supine GH AROM flex         Supine ABC    1x @90 flex  1x @120 flex  1x @90 flex  1x @ 120 flex   Standing GH abd & ER      Flex/scap/abd 1x10 ea   Ther Ex         pendulums         Table slides     Wall slides 2x10    Supine cane ER PROM 3x10 2x10  3x10 2x10 3x10   Elbow AROM         Wrist extensor str 3x30s RUE        Supine GH flex PROM W/cane 2x10 W/opp UE 2x10  3x10 W/cane 2x10 W/cane 2x10   Standing GH abd PROM         pulleys         BTB str w/SOS         Ther Activity                           Gait Training                           Modalities

## 2025-05-01 ENCOUNTER — OFFICE VISIT (OUTPATIENT)
Dept: PHYSICAL THERAPY | Facility: CLINIC | Age: 65
End: 2025-05-01
Payer: COMMERCIAL

## 2025-05-01 DIAGNOSIS — M25.511 ACUTE PAIN OF RIGHT SHOULDER: Primary | ICD-10-CM

## 2025-05-01 DIAGNOSIS — Z98.890 S/P RIGHT ROTATOR CUFF REPAIR: ICD-10-CM

## 2025-05-01 PROCEDURE — 97140 MANUAL THERAPY 1/> REGIONS: CPT

## 2025-05-01 PROCEDURE — 97110 THERAPEUTIC EXERCISES: CPT

## 2025-05-01 PROCEDURE — 97112 NEUROMUSCULAR REEDUCATION: CPT
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
## 2025-05-05 ENCOUNTER — OFFICE VISIT (OUTPATIENT)
Dept: PHYSICAL THERAPY | Facility: CLINIC | Age: 65
End: 2025-05-05
Payer: COMMERCIAL

## 2025-05-05 DIAGNOSIS — M25.511 ACUTE PAIN OF RIGHT SHOULDER: Primary | ICD-10-CM

## 2025-05-05 DIAGNOSIS — Z98.890 S/P RIGHT ROTATOR CUFF REPAIR: ICD-10-CM

## 2025-05-05 PROCEDURE — 97112 NEUROMUSCULAR REEDUCATION: CPT

## 2025-05-05 PROCEDURE — 97140 MANUAL THERAPY 1/> REGIONS: CPT

## 2025-05-05 PROCEDURE — 97110 THERAPEUTIC EXERCISES: CPT

## 2025-05-05 NOTE — PROGRESS NOTES
Daily Note     Today's date: 2025  Patient name: Efren Connell  : 1960  MRN: [id number]  Referring provider: Rio Coffey*  Dx:   Encounter Diagnosis     ICD-10-CM    1. Acute pain of right shoulder  M25.511       2. S/P right rotator cuff repair  Z98.890                      Subjective: Efren Connell reports his shoulder continues to be at increased level of soreness.      Objective: See treatment diary below      Assessment: Tolerated treatment well. Patient was given verbal cueing to decrease bicep recruitment with SL ER. Patient was then able to do SL ER with good form. Patient felt relief after the session. Continue to progress Patient due to tolerance. Patient would benefit from continued PT      Plan: Continue per plan of care.         Insurance:  [place]/CMS Eval/ Re-eval Auth #/ Referral # Total units or visits Start date  Expiration date KX? Visit limitation?  PT only or  PT+OT? Co-Insurance   AMA 25 NO AUTH  25  90 PCY  $0    3/17/25            4/21/25             POC Start Date POC Expiration Date Signed POC?   25 pending   3/17/25 5/12/25 pending   25 pending      Date               Visits/Units: NO AUTH Used               Authed: NO AUTH Remaining                   Precautions: RTC Repair Protocol  Access Code: [email or web address]  URL: [email or web address]  Date: 2025  Prepared by: Luis Armando Whittaker    Exercises  - Seated Scapular Retraction  - 3-4 x daily - 7 x weekly - 3 sets - 10 reps  - Seated Shoulder Flexion Towel Slide at Table Top  - 2-3 x daily - 7 x weekly - 2-3 sets - 10 reps - 3 hold  - Seated Elbow Flexion and Extension AROM  - 2-3 x daily - 7 x weekly - 3 sets - 10 reps  - Flexion-Extension Shoulder Pendulum with Table Support  - 2-3 x daily - 7 x weekly - 3 sets - 10 reps  - Supine Shoulder External Rotation with Dowel  - 2-3 x daily - 7 x weekly - 2-3 sets - 10 reps - 3 hold    Date    Visit  Number 26 25 24 23 22   Manuals        Shoulder PROM   All directions All directions    GH mobilizations AP gr. I-II  Inf gr. I-II AP gr. I-II  Inf gr. I-II AP gr. I-II  Inf gr. I-II AP gr. I-II                    Neuro Re-Ed         Scap retr Supine  15x5s       RTC isos        Prone I, T, Y        Prone rows        Ball wall circles        SL ER 2x10     Sl abd 2x10  2x10    SL abd 2x10    Standing ER in mirror 1x10 B  2x10    SL abd 2x10      W/VC 2x10      Supine ABC        Standing GH abd & ER        Standing GH ext 2x10 blue tb        Ther Ex        pendulums        Table slides        Supine cane ER PROM 2x10 2x10 3x10 2x10    Elbow AROM        Wrist extensor str   3x30s RUE     Supine GH flex PROM W/opp UE 2x10 W/opp UE 2x10 W/cane 2x10 W/opp UE 2x10    Standing functional ER  1x10      Standing GH abd PROM        pulleys        BTB str w/SOS        Ther Activity                        Gait Training                        Modalities

## 2025-05-08 ENCOUNTER — OFFICE VISIT (OUTPATIENT)
Dept: PHYSICAL THERAPY | Facility: CLINIC | Age: 65
End: 2025-05-08
Payer: COMMERCIAL

## 2025-05-08 DIAGNOSIS — Z98.890 S/P RIGHT ROTATOR CUFF REPAIR: ICD-10-CM

## 2025-05-08 DIAGNOSIS — M25.511 ACUTE PAIN OF RIGHT SHOULDER: Primary | ICD-10-CM

## 2025-05-08 PROCEDURE — 97110 THERAPEUTIC EXERCISES: CPT

## 2025-05-08 PROCEDURE — 97112 NEUROMUSCULAR REEDUCATION: CPT

## 2025-05-08 NOTE — PROGRESS NOTES
Daily Note     Today's date: 2025  Patient name: Efren Connell  : 1960  MRN: [id number]  Referring provider: Rio Coffey*  Dx:   Encounter Diagnosis     ICD-10-CM    1. Acute pain of right shoulder  M25.511       2. S/P right rotator cuff repair  Z98.890           Start Time: 933  Stop Time: 1015  Total time in clinic (min): 42 minutes    Subjective: Pt reports that his shoulder was feeling great yesterday during the day, at night it was more sore but it feels fine today. Pt feels better this week than he did last week, realizes it is going to take awhile with his rehab. Would also like an updated HEP.       Objective: See treatment diary below      Assessment: Tolerated treatment well. Patient demonstrated fatigue post treatment, exhibited good technique with therapeutic exercises, and would benefit from continued PT. Focused session on light AROM with heavy focus on scapular positioning and mobility. Updated pt's HEP.       Plan: Continue per plan of care.  Progress treatment as tolerated.          Insurance:  AMA/CMS Eval/ Re-eval Auth #/ Referral # Total units or visits Start date  Expiration date KX? Visit limitation?  PT only or  PT+OT? Co-Insurance   AMA 25 NO AUTH  25  90 PCY  $0    3/17/25            4/21/25             POC Start Date POC Expiration Date Signed POC?   25 pending   3/17/25 5/12/25 pending   25 pending      Date               Visits/Units: NO AUTH Used               Authed: NO AUTH Remaining                   Precautions: RTC Repair Protocol  Access Code: [email or web address]  URL: [email or web address]  Date: 2025  Prepared by: Luis Armando Whittaker    Exercises  - Seated Scapular Retraction  - 3-4 x daily - 7 x weekly - 3 sets - 10 reps  - Seated Shoulder Flexion Towel Slide at Table Top  - 2-3 x daily - 7 x weekly - 2-3 sets - 10 reps - 3 hold  - Seated Elbow Flexion and Extension AROM  - 2-3 x daily - 7 x weekly - 3  sets - 10 reps  - Flexion-Extension Shoulder Pendulum with Table Support  - 2-3 x daily - 7 x weekly - 3 sets - 10 reps  - Supine Shoulder External Rotation with Dowel  - 2-3 x daily - 7 x weekly - 2-3 sets - 10 reps - 3 hold    Date 5/8 5/5 5/1 4/28 4/24 4/21   Visit Number 27 26 25 24 23 22   Manuals         Shoulder PROM    All directions All directions    GH mobilizations  AP gr. I-II  Inf gr. I-II AP gr. I-II  Inf gr. I-II AP gr. I-II  Inf gr. I-II AP gr. I-II                      Neuro Re-Ed          Scap retr Rows & ext blue 2x10 ea Supine  15x5s       RTC isos         Prone I, T, Y Prone I 2x10        Prone rows         Ball wall circles         SL ER 2x10    SL abd 2x10 2x10     Sl abd 2x10  2x10    SL abd 2x10    Standing ER in mirror 1x10 B  2x10    SL abd 2x10    Standing GH flex 2x10 w/TC  W/VC 2x10      Supine ABC         Standing GH abd & ER ER 2x10        Standing GH ext  2x10 blue tb        Ther Ex         pendulums         Table slides         Supine cane ER PROM 2x10 2x10 2x10 3x10 2x10    Elbow AROM         Wrist extensor str    3x30s RUE     Supine GH flex PROM W/opp UE 2x10 W/opp UE 2x10 W/opp UE 2x10 W/cane 2x10 W/opp UE 2x10    Standing functional ER   1x10      Standing GH abd PROM         pulleys         BTB str w/SOS         Ther Activity                           Gait Training                           Modalities

## 2025-05-12 ENCOUNTER — OFFICE VISIT (OUTPATIENT)
Dept: PHYSICAL THERAPY | Facility: CLINIC | Age: 65
End: 2025-05-12
Payer: COMMERCIAL

## 2025-05-12 DIAGNOSIS — Z98.890 S/P RIGHT ROTATOR CUFF REPAIR: ICD-10-CM

## 2025-05-12 DIAGNOSIS — M25.511 ACUTE PAIN OF RIGHT SHOULDER: Primary | ICD-10-CM

## 2025-05-12 PROCEDURE — 97112 NEUROMUSCULAR REEDUCATION: CPT

## 2025-05-12 NOTE — PROGRESS NOTES
Daily Note     Today's date: 2025  Patient name: Efren Connell  : 1960  MRN: [id number]  Referring provider: Rio Coffey*  Dx:   Encounter Diagnosis     ICD-10-CM    1. Acute pain of right shoulder  M25.511       2. S/P right rotator cuff repair  Z98.890           Start Time: 1021  Stop Time: 1100  Total time in clinic (min): 39 minutes    Subjective: Pt reports that his shoulder has been feeling better over the last 1-2 weeks, he feels soreness in his shoulder but hasn't noticed the pain he did 2 weeks ago. Pt continues with his stretches at home. No new complaints.       Objective: See treatment diary below      Assessment: Tolerated treatment well. Patient demonstrated fatigue post treatment, exhibited good technique with therapeutic exercises, and would benefit from continued PT. Lightly progressed shoulder AROM, requiring TC and some scapular assistance during standing AROM.       Plan: Continue per plan of care.  Progress treatment as tolerated.          Insurance:  AMA/CMS Eval/ Re-eval Auth #/ Referral # Total units or visits Start date  Expiration date KX? Visit limitation?  PT only or  PT+OT? Co-Insurance   AMA 25 NO AUTH  25  90 PCY  $0    3/17/25            4/21/25             POC Start Date POC Expiration Date Signed POC?   25 pending   3/17/25 5/12/25 pending   25 pending      Date               Visits/Units: NO AUTH Used               Authed: NO AUTH Remaining                   Precautions: RTC Repair Protocol  Access Code: [email or web address]  URL: [email or web address]  Date: 2025  Prepared by: Luis Armando Whittaker    Exercises  - Seated Scapular Retraction  - 3-4 x daily - 7 x weekly - 3 sets - 10 reps  - Seated Shoulder Flexion Towel Slide at Table Top  - 2-3 x daily - 7 x weekly - 2-3 sets - 10 reps - 3 hold  - Seated Elbow Flexion and Extension AROM  - 2-3 x daily - 7 x weekly - 3 sets - 10 reps  - Flexion-Extension  Shoulder Pendulum with Table Support  - 2-3 x daily - 7 x weekly - 3 sets - 10 reps  - Supine Shoulder External Rotation with Dowel  - 2-3 x daily - 7 x weekly - 2-3 sets - 10 reps - 3 hold    Date 5/12 5/8 5/5 5/1 4/28 4/24   Visit Number 28 27 26 25 24 23   Manuals         Shoulder PROM     All directions All directions   GH mobilizations   AP gr. I-II  Inf gr. I-II AP gr. I-II  Inf gr. I-II AP gr. I-II  Inf gr. I-II AP gr. I-II                     Neuro Re-Ed          Scap retr  Rows & ext blue 2x10 ea Supine  15x5s      RTC isos         Prone I, T, Y  Prone I 2x10       Prone rows Bent over rows 5lbs 1x15        Ball wall circles         SL ER 2x10    SL abd 2x10 2x10    SL abd 2x10 2x10     Sl abd 2x10  2x10    SL abd 2x10    Standing ER in mirror 1x10 B  2x10    SL abd 2x10   Standing GH flex In doorway w/scap assist 2x10 2x10 w/TC  W/VC 2x10     Supine ABC         Standing GH abd & ER ER 2x10 ER 2x10       90/90 ER table supported Not at 90 flex due to biceps discomfort 2x10        bodyblade Neutral 1x30s        Standing GH ext   2x10 blue tb       Ther Ex         pendulums         Table slides         Supine cane ER PROM 2x10 2x10 2x10 2x10 3x10 2x10   Elbow AROM         Wrist extensor str     3x30s RUE    Supine GH flex PROM W/opp UE 2x10 W/opp UE 2x10 W/opp UE 2x10 W/opp UE 2x10 W/cane 2x10 W/opp UE 2x10   Standing functional ER    1x10     Standing GH abd PROM         pulleys         BTB str w/SOS         Ther Activity                           Gait Training                           Modalities

## 2025-05-15 ENCOUNTER — OFFICE VISIT (OUTPATIENT)
Dept: PHYSICAL THERAPY | Facility: CLINIC | Age: 65
End: 2025-05-15
Payer: COMMERCIAL

## 2025-05-15 DIAGNOSIS — Z98.890 S/P RIGHT ROTATOR CUFF REPAIR: ICD-10-CM

## 2025-05-15 DIAGNOSIS — M25.511 ACUTE PAIN OF RIGHT SHOULDER: Primary | ICD-10-CM

## 2025-05-15 PROCEDURE — 97112 NEUROMUSCULAR REEDUCATION: CPT

## 2025-05-15 NOTE — PROGRESS NOTES
Daily Note     Today's date: 5/15/2025  Patient name: Efren Connell  : 1960  MRN: [id number]  Referring provider: Rio Coffey*  Dx:   Encounter Diagnosis     ICD-10-CM    1. Acute pain of right shoulder  M25.511       2. S/P right rotator cuff repair  Z98.890           Start Time: 1017  Stop Time: 1101  Total time in clinic (min): 44 minutes    Subjective: Pt states feeling sore after his last session for about 24 hours but felt great the next day. Pt feels his shoulder is doing well overall, no new complaints.       Objective: See treatment diary below      Assessment: Tolerated treatment well. Patient demonstrated fatigue post treatment, exhibited good technique with therapeutic exercises, and would benefit from continued PT      Plan: Continue per plan of care.  Progress treatment as tolerated.          Insurance:  [place]/CMS Eval/ Re-eval Auth #/ Referral # Total units or visits Start date  Expiration date KX? Visit limitation?  PT only or  PT+OT? Co-Insurance   AMA 25 NO AUTH  25  90 PCY  $0    3/17/25            4/21/25             POC Start Date POC Expiration Date Signed POC?   25 pending   3/17/25 5/12/25 pending   25 pending      Date               Visits/Units: NO AUTH Used               Authed: NO AUTH Remaining                   Precautions: RTC Repair Protocol  Access Code: [email or web address]  URL: [email or web address]  Date: 2025  Prepared by: Luis Armando Whittaker    Exercises  - Seated Scapular Retraction  - 3-4 x daily - 7 x weekly - 3 sets - 10 reps  - Seated Shoulder Flexion Towel Slide at Table Top  - 2-3 x daily - 7 x weekly - 2-3 sets - 10 reps - 3 hold  - Seated Elbow Flexion and Extension AROM  - 2-3 x daily - 7 x weekly - 3 sets - 10 reps  - Flexion-Extension Shoulder Pendulum with Table Support  - 2-3 x daily - 7 x weekly - 3 sets - 10 reps  - Supine Shoulder External Rotation with Dowel  - 2-3 x daily - 7 x weekly - 2-3  sets - 10 reps - 3 hold    Date 5/15 5/12 5/8 5/5 5/1 4/28   Visit Number 29 28 27 26 25 24   Manuals         Shoulder PROM      All directions   GH mobilizations    AP gr. I-II  Inf gr. I-II AP gr. I-II  Inf gr. I-II AP gr. I-II  Inf gr. I-II                     Neuro Re-Ed          Scap retr   Rows & ext blue 2x10 ea Supine  15x5s     RTC isos RTB walkouts 10x ea        Prone I, T, Y   Prone I 2x10      Prone rows  Bent over rows 5lbs 1x15       Ball wall circles         SL ER ER 2x10    Flex 2x10 2x10    SL abd 2x10 2x10    SL abd 2x10 2x10     Sl abd 2x10  2x10    SL abd 2x10    Standing ER in mirror 1x10 B    Standing GH flex In doorway no scap assist 2x10 In doorway w/scap assist 2x10 2x10 w/TC  W/VC 2x10    Supine ABC         Standing GH abd & ER  ER 2x10 ER 2x10      90/90 ER table supported ER 2x10 w/2lbs, 1x10 no weight    IR RTB 2x10   Not at 90 flex due to biceps discomfort 2x10       bodyblade Neutral 3x30s Neutral 1x30s       Standing GH ext    2x10 blue tb      Ther Ex         pendulums         Table slides         Supine cane ER PROM 2x10 2x10 2x10 2x10 2x10 3x10   Elbow AROM         Wrist extensor str      3x30s RUE   Supine GH flex PROM W/opp UE 2x10 W/opp UE 2x10 W/opp UE 2x10 W/opp UE 2x10 W/opp UE 2x10 W/cane 2x10   Standing functional ER     1x10    Standing GH abd PROM         pulleys         BTB str w/SOS         Ther Activity                           Gait Training                           Modalities

## 2025-05-19 ENCOUNTER — OFFICE VISIT (OUTPATIENT)
Dept: PHYSICAL THERAPY | Facility: CLINIC | Age: 65
End: 2025-05-19
Payer: COMMERCIAL

## 2025-05-19 DIAGNOSIS — M25.511 ACUTE PAIN OF RIGHT SHOULDER: Primary | ICD-10-CM

## 2025-05-19 DIAGNOSIS — Z98.890 S/P RIGHT ROTATOR CUFF REPAIR: ICD-10-CM

## 2025-05-19 PROCEDURE — 97112 NEUROMUSCULAR REEDUCATION: CPT

## 2025-05-19 PROCEDURE — 97110 THERAPEUTIC EXERCISES: CPT

## 2025-05-19 NOTE — PROGRESS NOTES
Daily Note     Today's date: 2025  Patient name: Efren Connell  : 1960  MRN: [id number]  Referring provider: Rio Coffey*  Dx:   Encounter Diagnosis     ICD-10-CM    1. Acute pain of right shoulder  M25.511       2. S/P right rotator cuff repair  Z98.890           Start Time: 932  Stop Time: 1015  Total time in clinic (min): 43 minutes    Subjective: Pt states that his shoulder is tight this morning but feels fine. Pt used his weed cody this past weekend for 3 hours, had it draped around his neck/shoulder to assist and it just felt tired. No pain at rest prior to the start of his session, no new complaints.       Objective: See treatment diary below      Assessment: Tolerated treatment well. Patient demonstrated fatigue post treatment, exhibited good technique with therapeutic exercises, and would benefit from continued PT. Introduced more strengthening with wall D2 where he showed several compensations requiring TC to help maintain proper positioning. HEP was updated to reflect today's progressions.       Plan: Continue per plan of care.  Progress treatment as tolerated.          Insurance:  AMA/CMS Eval/ Re-eval Auth #/ Referral # Total units or visits Start date  Expiration date KX? Visit limitation?  PT only or  PT+OT? Co-Insurance   AMA 25 NO AUTH  25  90 PCY  $0    3/17/25            4/21/25             POC Start Date POC Expiration Date Signed POC?   25 pending   3/17/25 5/12/25 pending   25 pending      Date               Visits/Units: NO AUTH Used               Authed: NO AUTH Remaining                   Precautions: RTC Repair Protocol  Access Code: [email or web address]  URL: [email or web address]  Date: 2025  Prepared by: Luis Armando Whittaker    Exercises  - Seated Scapular Retraction  - 3-4 x daily - 7 x weekly - 3 sets - 10 reps  - Seated Shoulder Flexion Towel Slide at Table Top  - 2-3 x daily - 7 x weekly - 2-3 sets - 10 reps  - 3 hold  - Seated Elbow Flexion and Extension AROM  - 2-3 x daily - 7 x weekly - 3 sets - 10 reps  - Flexion-Extension Shoulder Pendulum with Table Support  - 2-3 x daily - 7 x weekly - 3 sets - 10 reps  - Supine Shoulder External Rotation with Dowel  - 2-3 x daily - 7 x weekly - 2-3 sets - 10 reps - 3 hold    Date 5/19 5/15 5/12 5/8 5/5 5/1   Visit Number [phone]   Manuals         Shoulder PROM         GH mobilizations     AP gr. I-II  Inf gr. I-II AP gr. I-II  Inf gr. I-II                     Neuro Re-Ed          Scap retr    Rows & ext blue 2x10 ea Supine  15x5s    RTC isos RTB AROM 25x ea RTB walkouts 10x ea       Prone I, T, Y    Prone I 2x10     Standing GH horiz abd RTB 2x10        Prone rows   Bent over rows 5lbs 1x15      Ball wall circles         SL ER ER 2x10    Flex 2x10 ER 2x10    Flex 2x10 2x10    SL abd 2x10 2x10    SL abd 2x10 2x10     Sl abd 2x10  2x10    SL abd 2x10    Standing ER in mirror 1x10 B   Standing GH flex  In doorway no scap assist 2x10 In doorway w/scap assist 2x10 2x10 w/TC  W/VC 2x10   Supine ABC         Standing GH abd & ER   ER 2x10 ER 2x10     90/90 ER table supported ER 1x10 no weight, 2x10 w/2lbs    IR RTB 2x10 ER 2x10 w/2lbs, 1x10 no weight    IR RTB 2x10   Not at 90 flex due to biceps discomfort 2x10      bodyblade  Neutral 3x30s Neutral 1x30s      D2 at wall W/TC 2x10        Standing GH ext     2x10 blue tb     Ther Ex         pendulums         Table slides         Supine cane ER PROM 3x10 2x10 2x10 2x10 2x10 2x10   Elbow AROM         Wrist extensor str         Supine GH flex PROM W/opp UE 2x10 W/opp UE 2x10 W/opp UE 2x10 W/opp UE 2x10 W/opp UE 2x10 W/opp UE 2x10   Standing functional ER      1x10   Standing GH abd PROM         pulleys         BTB str w/SOS         Ther Activity                           Gait Training                           Modalities

## 2025-05-22 ENCOUNTER — EVALUATION (OUTPATIENT)
Dept: PHYSICAL THERAPY | Facility: CLINIC | Age: 65
End: 2025-05-22
Payer: COMMERCIAL

## 2025-05-22 DIAGNOSIS — M25.511 ACUTE PAIN OF RIGHT SHOULDER: Primary | ICD-10-CM

## 2025-05-22 DIAGNOSIS — Z98.890 S/P RIGHT ROTATOR CUFF REPAIR: ICD-10-CM

## 2025-05-22 PROCEDURE — 97110 THERAPEUTIC EXERCISES: CPT

## 2025-05-22 NOTE — PROGRESS NOTES
PT Re-Evaluation     Today's date: 2025  Patient name: Efren Connell  : 1960  MRN: [id number]  Referring provider: Rio Coffey*  Dx:   Encounter Diagnosis     ICD-10-CM    1. Acute pain of right shoulder  M25.511       2. S/P right rotator cuff repair  Z98.890                 Start Time: 1015  Stop Time: 1051  Total time in clinic (min): 36 minutes    Assessment  Impairments: abnormal coordination, abnormal muscle tone, abnormal or restricted ROM, abnormal movement, activity intolerance, impaired physical strength, pain with function, poor posture , poor body mechanics, participation limitations, activity limitations and endurance  Symptom irritability: moderate    Assessment details: Pt is a 64 y.o male who presents to skilled physical therapy for his 31st visit, approximately 4 months s/p R RTC repair and shoulder debridement, performed on 25. Pt arrives withfair posture, slightly increased R shoulder A/PROM, normalized elbow AROM, and is no longer TTP along his anterior R shoulder. Pt also demonstrated slightly increased R shoulder MMT with neutral ER & IR, however hiking will occasionally occur with reaching over shoulder height. Pt continues to slowly progress through his surgical protocol, however ROM, strength deficits persist as well as mechanical deficits. Pt's continued pain and deficits are preventing him from performing self care, ADLs, recreational activities, and work duties without compensations. Pt was re-educated on his diagnosis, prognosis, POC, HEP, and surgical precautions/contraindications. Pt would continue to benefit from skilled physical therapy to reduce his pain, address his deficits, progress towards his goals, work through his post-surgical protocol, and return to his PLOF.   Understanding of Dx/Px/POC: good     Prognosis: good    Goals  Short Term Goals:  1) Pt will initiate and progress his HEP in 3-4 weeks.- Met  2) Pt will improve his sitting and  standing posture to good in 3-4 weeks.- Progressing  3) Pt will improve his PROM shoulder flexion to at least 90 degrees to improve self care in 3-4 weeks.- Met  4) Pt will initiate AROM shoulder exercises in 3-4 weeks to improve his ADLs.- Met    Long Term Goals:  1) Pt will be able to sleep for 6-8 hours without shoulder pain in 6-8 weeks.- Progressing  2) Pt will be able to get dressed with less than 2/10 pain in 6-8 weeks.- Met  3) Pt will be able to put on a coat with less than 2/10 apin in 6-8 weeks.- Progressing  4) Pt will be able to reach a shelf at or above shoulder height with less than 3/10 pain in 8-12 weeks.- Progressing  5) Pt will be able to perform his ADLs with less than 3/10 pain in 8-12 weeks.- Progressing    Plan  Patient would benefit from: skilled physical therapy and PT eval  Planned modality interventions: cryotherapy and thermotherapy: hydrocollator packs    Planned therapy interventions: activity modification, joint mobilization, ADL retraining, manual therapy, body mechanics training, motor coordination training, neuromuscular re-education, patient/caregiver education, coordination, postural training, strengthening, stretching, therapeutic activities, flexibility, functional ROM exercises, graded exercise and home exercise program    Frequency: 2x week  Duration in weeks: 6  Plan of Care beginning date: 5/22/2025  Plan of Care expiration date: 7/3/2025  Treatment plan discussed with: patient        Subjective Evaluation    History of Present Illness  Date of surgery: 2/6/2025  Mechanism of injury: surgery  Mechanism of injury: 5/22/25 Update:  Pt states that his shoulder is doing well overall, he feels his strength is improving, it doesn't hurt as much when he is in the car or with random movements. Feels the mobility is better, less stiff. However when new exercises are introduced he feels he gets a delayed soreness. Pt is able to do chores around the house, shower, get dressed without  issue. Sleeping is generally fine, shoulder isn't the thing that makes it difficult. Pt is now able to turn a screwdriver and open a jar without issue.   Patient Goals  Patient goals for therapy: decreased pain, increased motion, increased strength, return to sport/leisure activities, independence with ADLs/IADLs and return to work  Patient goal: lift overhead  Pain  Current pain ratin  At best pain ratin  At worst pain ratin  Location: anterior R shoulder  Quality: tight and dull ache  Relieving factors: medications  Aggravating factors: lifting and overhead activity  Progression: improved    Social Support  Steps to enter house: yes  Stairs in house: yes   Lives in: multiple-level home  Lives with: spouse    Employment status: working  Hand dominance: right  Exercise history: pendulums          Objective     Static Posture     Head  Forward.    Shoulders  Asymmetric shoulders, elevated and rounded.    Postural Observations  Seated posture: fair  Standing posture: fair  Correction of posture: has no consistent effect      Tenderness     Right Shoulder  No tenderness in the AC joint, acromion, biceps tendon (proximal), infraspinatus tendon and supraspinatus tendon.     Active Range of Motion     Right Shoulder   Flexion: 100 (minor pain) degrees   Abduction: 110 (tight) degrees   External rotation BTH: C6   Internal rotation BTB: Active internal rotation behind the back: sacrum.     Right Elbow   Flexion: WFL  Extension: WFL    Passive Range of Motion     Right Shoulder   Flexion: 147 degrees   Abduction: 105 (tight) degrees   External rotation 45°: 62 (tight/almost painful) degrees   External rotation 90°: 53 degrees   Internal rotation 45°: 40 degrees   Internal rotation 90°: 15 degrees     Strength/Myotome Testing     Left Shoulder     Planes of Motion   Flexion: 4+   Abduction: 4+     Right Shoulder     Planes of Motion   Flexion: 3-   Abduction: 3-   External rotation at 0°: 4-   Internal rotation  at 0°: 4-     Left Elbow   Flexion: 4+  Extension: 4+    Right Elbow   Flexion: 4  Extension: 4                Insurance:  AMA/CMS Eval/ Re-eval Auth #/ Referral # Total units or visits Start date  Expiration date KX? Visit limitation?  PT only or  PT+OT? Co-Insurance   AMA 2/20/25 NO AUTH  2/20/25 12/31/25  90 PCY  $0    3/17/25            4/21/25            5/22/25             POC Start Date POC Expiration Date Signed POC?   2/20/25 4/17/25 pending   3/17/25 5/12/25 pending   4/21/25 6/2/25 pending   5/22/25 7/3/25 pending      Date               Visits/Units: NO AUTH Used               Authed: NO AUTH Remaining                   Precautions: RTC Repair Protocol  Access Code: [email or web address]  URL: [email or web address]  Date: 02/20/2025  Prepared by: Luis Armando Whittaker    Exercises  - Seated Scapular Retraction  - 3-4 x daily - 7 x weekly - 3 sets - 10 reps  - Seated Shoulder Flexion Towel Slide at Table Top  - 2-3 x daily - 7 x weekly - 2-3 sets - 10 reps - 3 hold  - Seated Elbow Flexion and Extension AROM  - 2-3 x daily - 7 x weekly - 3 sets - 10 reps  - Flexion-Extension Shoulder Pendulum with Table Support  - 2-3 x daily - 7 x weekly - 3 sets - 10 reps  - Supine Shoulder External Rotation with Dowel  - 2-3 x daily - 7 x weekly - 2-3 sets - 10 reps - 3 hold    Date 5/22 5/19 5/15 5/12 5/8 5/5   Visit Number [phone]   Manuals         Shoulder PROM         GH mobilizations      AP gr. I-II  Inf gr. I-II                     Neuro Re-Ed          Scap retr     Rows & ext blue 2x10 ea Supine  15x5s   RTC isos  RTB AROM 25x ea RTB walkouts 10x ea      Prone I, T, Y     Prone I 2x10    Standing GH horiz abd  RTB 2x10       Prone rows    Bent over rows 5lbs 1x15     Ball wall circles         SL ER  ER 2x10    Flex 2x10 ER 2x10    Flex 2x10 2x10    SL abd 2x10 2x10    SL abd 2x10 2x10     Sl abd 2x10    Standing GH flex   In doorway no scap assist 2x10 In doorway w/scap assist 2x10 2x10 w/TC    Supine ABC          Standing GH abd & ER    ER 2x10 ER 2x10    90/90 ER table supported  ER 1x10 no weight, 2x10 w/2lbs    IR RTB 2x10 ER 2x10 w/2lbs, 1x10 no weight    IR RTB 2x10   Not at 90 flex due to biceps discomfort 2x10     bodyblade   Neutral 3x30s Neutral 1x30s     D2 at wall  W/TC 2x10       Standing GH ext      2x10 blue tb    Ther Ex         pendulums         Table slides         Supine cane ER PROM  3x10 2x10 2x10 2x10 2x10   Elbow AROM         Wrist extensor str         Supine GH flex PROM  W/opp UE 2x10 W/opp UE 2x10 W/opp UE 2x10 W/opp UE 2x10 W/opp UE 2x10   Standing functional ER         Standing GH abd PROM         pulleys         BTB str w/SOS         Ther Activity                           Gait Training                           Modalities

## 2025-05-22 NOTE — LETTER
May 22, 2025    Rio Coffey MD  [address] [place] [id number]    Patient: Efren Connell   YOB: 1960   Date of Visit: 2025     Encounter Diagnosis     ICD-10-CM    1. Acute pain of right shoulder  M25.511       2. S/P right rotator cuff repair  Z98.890           Dear Dr. Rio Coffey MD:    Thank you for your recent referral of Efren Connell. Please review the attached evaluation summary from Efren's recent visit.     Please verify that you agree with the plan of care by signing the attached order.     If you have any questions or concerns, please do not hesitate to call.     I sincerely appreciate the opportunity to share in the care of one of your patients and hope to have another opportunity to work with you in the near future.       Sincerely,    Luis Armando Whittaker, PT      Referring Provider:      I certify that I have read the below Plan of Care and certify the need for these services furnished under this plan of treatment while under my care.                    Rio Coffey MD  [address] [place] [id number]  Via Fax: [phone]          PT Re-Evaluation     Today's date: 2025  Patient name: Efren Connell  : 1960  MRN: [id number]  Referring provider: Rio Coffey*  Dx:   Encounter Diagnosis     ICD-10-CM    1. Acute pain of right shoulder  M25.511       2. S/P right rotator cuff repair  Z98.890                 Start Time: 1015  Stop Time: 1051  Total time in clinic (min): 36 minutes    Assessment  Impairments: abnormal coordination, abnormal muscle tone, abnormal or restricted ROM, abnormal movement, activity intolerance, impaired physical strength, pain with function, poor posture , poor body mechanics, participation limitations, activity limitations and endurance  Symptom irritability: moderate    Assessment details: Pt is a 64 y.o male who presents to skilled physical therapy  for his 31st visit, approximately 4 months s/p R RTC repair and shoulder debridement, performed on 2/6/25. Pt arrives withfair posture, slightly increased R shoulder A/PROM, normalized elbow AROM, and is no longer TTP along his anterior R shoulder. Pt also demonstrated slightly increased R shoulder MMT with neutral ER & IR, however hiking will occasionally occur with reaching over shoulder height. Pt continues to slowly progress through his surgical protocol, however ROM, strength deficits persist as well as mechanical deficits. Pt's continued pain and deficits are preventing him from performing self care, ADLs, recreational activities, and work duties without compensations. Pt was re-educated on his diagnosis, prognosis, POC, HEP, and surgical precautions/contraindications. Pt would continue to benefit from skilled physical therapy to reduce his pain, address his deficits, progress towards his goals, work through his post-surgical protocol, and return to his PLOF.   Understanding of Dx/Px/POC: good     Prognosis: good    Goals  Short Term Goals:  1) Pt will initiate and progress his HEP in 3-4 weeks.- Met  2) Pt will improve his sitting and standing posture to good in 3-4 weeks.- Progressing  3) Pt will improve his PROM shoulder flexion to at least 90 degrees to improve self care in 3-4 weeks.- Met  4) Pt will initiate AROM shoulder exercises in 3-4 weeks to improve his ADLs.- Met    Long Term Goals:  1) Pt will be able to sleep for 6-8 hours without shoulder pain in 6-8 weeks.- Progressing  2) Pt will be able to get dressed with less than 2/10 pain in 6-8 weeks.- Met  3) Pt will be able to put on a coat with less than 2/10 apin in 6-8 weeks.- Progressing  4) Pt will be able to reach a shelf at or above shoulder height with less than 3/10 pain in 8-12 weeks.- Progressing  5) Pt will be able to perform his ADLs with less than 3/10 pain in 8-12 weeks.- Progressing    Plan  Patient would benefit from: skilled physical  therapy and PT eval  Planned modality interventions: cryotherapy and thermotherapy: hydrocollator packs    Planned therapy interventions: activity modification, joint mobilization, ADL retraining, manual therapy, body mechanics training, motor coordination training, neuromuscular re-education, patient/caregiver education, coordination, postural training, strengthening, stretching, therapeutic activities, flexibility, functional ROM exercises, graded exercise and home exercise program    Frequency: 2x week  Duration in weeks: 6  Plan of Care beginning date: 2025  Plan of Care expiration date: 7/3/2025  Treatment plan discussed with: patient        Subjective Evaluation    History of Present Illness  Date of surgery: 2025  Mechanism of injury: surgery  Mechanism of injury: 25 Update:  Pt states that his shoulder is doing well overall, he feels his strength is improving, it doesn't hurt as much when he is in the car or with random movements. Feels the mobility is better, less stiff. However when new exercises are introduced he feels he gets a delayed soreness. Pt is able to do chores around the house, shower, get dressed without issue. Sleeping is generally fine, shoulder isn't the thing that makes it difficult. Pt is now able to turn a screwdriver and open a jar without issue.   Patient Goals  Patient goals for therapy: decreased pain, increased motion, increased strength, return to sport/leisure activities, independence with ADLs/IADLs and return to work  Patient goal: lift overhead  Pain  Current pain ratin  At best pain ratin  At worst pain ratin  Location: anterior R shoulder  Quality: tight and dull ache  Relieving factors: medications  Aggravating factors: lifting and overhead activity  Progression: improved    Social Support  Steps to enter house: yes  Stairs in house: yes   Lives in: multiple-level home  Lives with: spouse    Employment status: working  Hand dominance: right  Exercise  history: pendulums          Objective     Static Posture     Head  Forward.    Shoulders  Asymmetric shoulders, elevated and rounded.    Postural Observations  Seated posture: fair  Standing posture: fair  Correction of posture: has no consistent effect      Tenderness     Right Shoulder  No tenderness in the AC joint, acromion, biceps tendon (proximal), infraspinatus tendon and supraspinatus tendon.     Active Range of Motion     Right Shoulder   Flexion: 100 (minor pain) degrees   Abduction: 110 (tight) degrees   External rotation BTH: C6   Internal rotation BTB: Active internal rotation behind the back: sacrum.     Right Elbow   Flexion: WFL  Extension: WFL    Passive Range of Motion     Right Shoulder   Flexion: 147 degrees   Abduction: 105 (tight) degrees   External rotation 45°: 62 (tight/almost painful) degrees   External rotation 90°: 53 degrees   Internal rotation 45°: 40 degrees   Internal rotation 90°: 15 degrees     Strength/Myotome Testing     Left Shoulder     Planes of Motion   Flexion: 4+   Abduction: 4+     Right Shoulder     Planes of Motion   Flexion: 3-   Abduction: 3-   External rotation at 0°: 4-   Internal rotation at 0°: 4-     Left Elbow   Flexion: 4+  Extension: 4+    Right Elbow   Flexion: 4  Extension: 4                Insurance:  AMA/CMS Eval/ Re-eval Auth #/ Referral # Total units or visits Start date  Expiration date KX? Visit limitation?  PT only or  PT+OT? Co-Insurance   AMA 2/20/25 NO AUTH  2/20/25 12/31/25  90 PCY  $0    3/17/25            4/21/25            5/22/25             POC Start Date POC Expiration Date Signed POC?   2/20/25 4/17/25 pending   3/17/25 5/12/25 pending   4/21/25 6/2/25 pending   5/22/25 7/3/25 pending      Date               Visits/Units: NO AUTH Used               Authed: NO AUTH Remaining                   Precautions: RTC Repair Protocol  Access Code: [email or web address]  URL: [email or web address]  Date: 02/20/2025  Prepared by: Luis Armando  Panfilo    Exercises  - Seated Scapular Retraction  - 3-4 x daily - 7 x weekly - 3 sets - 10 reps  - Seated Shoulder Flexion Towel Slide at Table Top  - 2-3 x daily - 7 x weekly - 2-3 sets - 10 reps - 3 hold  - Seated Elbow Flexion and Extension AROM  - 2-3 x daily - 7 x weekly - 3 sets - 10 reps  - Flexion-Extension Shoulder Pendulum with Table Support  - 2-3 x daily - 7 x weekly - 3 sets - 10 reps  - Supine Shoulder External Rotation with Dowel  - 2-3 x daily - 7 x weekly - 2-3 sets - 10 reps - 3 hold    Date 5/22 5/19 5/15 5/12 5/8 5/5   Visit Number [phone]   Manuals         Shoulder PROM         GH mobilizations      AP gr. I-II  Inf gr. I-II                     Neuro Re-Ed          Scap retr     Rows & ext blue 2x10 ea Supine  15x5s   RTC isos  RTB AROM 25x ea RTB walkouts 10x ea      Prone I, T, Y     Prone I 2x10    Standing GH horiz abd  RTB 2x10       Prone rows    Bent over rows 5lbs 1x15     Ball wall circles         SL ER  ER 2x10    Flex 2x10 ER 2x10    Flex 2x10 2x10    SL abd 2x10 2x10    SL abd 2x10 2x10     Sl abd 2x10    Standing GH flex   In doorway no scap assist 2x10 In doorway w/scap assist 2x10 2x10 w/TC    Supine ABC         Standing GH abd & ER    ER 2x10 ER 2x10    90/90 ER table supported  ER 1x10 no weight, 2x10 w/2lbs    IR RTB 2x10 ER 2x10 w/2lbs, 1x10 no weight    IR RTB 2x10   Not at 90 flex due to biceps discomfort 2x10     bodyblade   Neutral 3x30s Neutral 1x30s     D2 at wall  W/TC 2x10       Standing GH ext      2x10 blue tb    Ther Ex         pendulums         Table slides         Supine cane ER PROM  3x10 2x10 2x10 2x10 2x10   Elbow AROM         Wrist extensor str         Supine GH flex PROM  W/opp UE 2x10 W/opp UE 2x10 W/opp UE 2x10 W/opp UE 2x10 W/opp UE 2x10   Standing functional ER         Standing GH abd PROM         pulleys         BTB str w/SOS         Ther Activity                           Gait Training                           Modalities

## 2025-05-29 ENCOUNTER — OFFICE VISIT (OUTPATIENT)
Dept: PHYSICAL THERAPY | Facility: CLINIC | Age: 65
End: 2025-05-29
Payer: COMMERCIAL

## 2025-05-29 DIAGNOSIS — Z98.890 S/P RIGHT ROTATOR CUFF REPAIR: ICD-10-CM

## 2025-05-29 DIAGNOSIS — M25.511 ACUTE PAIN OF RIGHT SHOULDER: Primary | ICD-10-CM

## 2025-05-29 PROCEDURE — 97140 MANUAL THERAPY 1/> REGIONS: CPT

## 2025-05-29 PROCEDURE — 97112 NEUROMUSCULAR REEDUCATION: CPT

## 2025-05-29 NOTE — PROGRESS NOTES
Daily Note     Today's date: 2025  Patient name: Efren Connell  : 1960  MRN: [id number]  Referring provider: Rio Coffey*  Dx:   Encounter Diagnosis     ICD-10-CM    1. Acute pain of right shoulder  M25.511       2. S/P right rotator cuff repair  Z98.890           Start Time: 1018  Stop Time: 1101  Total time in clinic (min): 43 minutes    Subjective: Pt states he always feels good after treatment sessions, but he felt delayed onset soreness last week that lasted for a few days after. Pt reports when he sits on his tractor, the bumping up and down bothers his R shoulder. Pt saw his surgeon and was told everything is good and he is on track with his rehab.       Objective: See treatment diary below      Assessment: Tolerated treatment well. Patient demonstrated fatigue post treatment, exhibited good technique with therapeutic exercises, and would benefit from continued PT. Pt continues to demonstrate decreased R shoulder ROM globally. Pt did not experience pain during today's session when strengthening within his available range.       Plan: Continue per plan of care.         Insurance:  AMA/CMS Eval/ Re-eval Auth #/ Referral # Total units or visits Start date  Expiration date KX? Visit limitation?  PT only or  PT+OT? Co-Insurance   AMA 25 NO AUTH  25  90 PCY  $0    3/17/25            4/21/25            5/22/25             POC Start Date POC Expiration Date Signed POC?   25 pending   3/17/25 5/12/25 pending   25 pending   5/22/25 7/3/25 pending      Date               Visits/Units: NO AUTH Used               Authed: NO AUTH Remaining                   Precautions: RTC Repair Protocol  Access Code: [email or web address]  URL: [email or web address]  Date: 2025  Prepared by: Luis Armando Whittaker    Exercises  - Seated Scapular Retraction  - 3-4 x daily - 7 x weekly - 3 sets - 10 reps  - Seated Shoulder Flexion Towel Slide at Table Top  - 2-3 x daily  - 7 x weekly - 2-3 sets - 10 reps - 3 hold  - Seated Elbow Flexion and Extension AROM  - 2-3 x daily - 7 x weekly - 3 sets - 10 reps  - Flexion-Extension Shoulder Pendulum with Table Support  - 2-3 x daily - 7 x weekly - 3 sets - 10 reps  - Supine Shoulder External Rotation with Dowel  - 2-3 x daily - 7 x weekly - 2-3 sets - 10 reps - 3 hold    Date 5/29 5/22 5/19 5/15 5/12 5/8 5/5   Visit Number [phone]   Manuals          Shoulder PROM All directions         GH mobilizations Post gr. II-III      AP gr. I-II  Inf gr. I-II                       Neuro Re-Ed           Scap retr      Rows & ext blue 2x10 ea Supine  15x5s   RTC isos RTB AROM 25x ea  RTB AROM 25x ea RTB walkouts 10x ea      Prone I, T, Y      Prone I 2x10    Standing GH horiz abd RTB 2x10  RTB 2x10       Prone rows     Bent over rows 5lbs 1x15     Ball wall circles Red ball 30 circles         SL ER ER 2x10    Flex 2x10  ER 2x10    Flex 2x10 ER 2x10    Flex 2x10 2x10    SL abd 2x10 2x10    SL abd 2x10 2x10     Sl abd 2x10    Standing GH flex    In doorway no scap assist 2x10 In doorway w/scap assist 2x10 2x10 w/TC    Supine ABC          Standing GH abd & ER     ER 2x10 ER 2x10    90/90 ER table supported ER 1x10 no weight, 2x10 w/2lbs    IR RTB 2x10  ER 1x10 no weight, 2x10 w/2lbs    IR RTB 2x10 ER 2x10 w/2lbs, 1x10 no weight    IR RTB 2x10   Not at 90 flex due to biceps discomfort 2x10     bodyblade    Neutral 3x30s Neutral 1x30s     D2 at wall 2x10  W/TC 2x10       Standing GH ext       2x10 blue tb    Ther Ex          pendulums          Table slides          Supine cane ER PROM   3x10 2x10 2x10 2x10 2x10   Elbow AROM          Wrist extensor str          Supine GH flex PROM   W/opp UE 2x10 W/opp UE 2x10 W/opp UE 2x10 W/opp UE 2x10 W/opp UE 2x10   Standing functional ER          Standing GH abd PROM          pulleys          BTB str w/SOS          Ther Activity                              Gait Training                              Modalities                                   Pt was treated by FRANCES Sylvester under the direct supervision of Luis Armando Whittaker DPT.

## 2025-06-02 ENCOUNTER — OFFICE VISIT (OUTPATIENT)
Dept: PHYSICAL THERAPY | Facility: CLINIC | Age: 65
End: 2025-06-02
Payer: COMMERCIAL

## 2025-06-02 DIAGNOSIS — Z98.890 S/P RIGHT ROTATOR CUFF REPAIR: ICD-10-CM

## 2025-06-02 DIAGNOSIS — M25.511 ACUTE PAIN OF RIGHT SHOULDER: Primary | ICD-10-CM

## 2025-06-02 PROCEDURE — 97110 THERAPEUTIC EXERCISES: CPT

## 2025-06-02 PROCEDURE — 97112 NEUROMUSCULAR REEDUCATION: CPT

## 2025-06-02 PROCEDURE — 97140 MANUAL THERAPY 1/> REGIONS: CPT

## 2025-06-02 NOTE — PROGRESS NOTES
Daily Note     Today's date: 2025  Patient name: Efren Connell  : 1960  MRN: [id number]  Referring provider: Rio Coffey*  Dx:   Encounter Diagnosis     ICD-10-CM    1. Acute pain of right shoulder  M25.511       2. S/P right rotator cuff repair  Z98.890           Start Time: 932  Stop Time: 1012  Total time in clinic (min): 40 minutes    Subjective: Pt reports that his shoulder is doing well, he isn't noticing any pain recently. He feels over the last 1-1.5 weeks things have been better, he feels tight still and is avoiding overdoing it but is improving. No new complaints.       Objective: See treatment diary below      Assessment: Tolerated treatment well. Patient demonstrated fatigue post treatment, exhibited good technique with therapeutic exercises, and would benefit from continued PT. Pt demonstrated fewer instances of scapular hiking during standing AROM exercises, however he continues to use trunk rotation to help with elevated GH ER.       Plan: Continue per plan of care.  Progress treatment as tolerated.          Insurance:  AMA/CMS Eval/ Re-eval Auth #/ Referral # Total units or visits Start date  Expiration date KX? Visit limitation?  PT only or  PT+OT? Co-Insurance   AMA 25 NO AUTH  25  90 PCY  $0    3/17/25            4/21/25            5/22/25             POC Start Date POC Expiration Date Signed POC?   25 pending   3/17/25 5/12/25 pending   25 pending   5/22/25 7/3/25 pending      Date               Visits/Units: NO AUTH Used               Authed: NO AUTH Remaining                   Precautions: RTC Repair Protocol  Access Code: [email or web address]  URL: [email or web address]  Date: 2025  Prepared by: Luis Armando Whittaker    Exercises  - Seated Scapular Retraction  - 3-4 x daily - 7 x weekly - 3 sets - 10 reps  - Seated Shoulder Flexion Towel Slide at Table Top  - 2-3 x daily - 7 x weekly - 2-3 sets - 10 reps - 3 hold  - Seated  Elbow Flexion and Extension AROM  - 2-3 x daily - 7 x weekly - 3 sets - 10 reps  - Flexion-Extension Shoulder Pendulum with Table Support  - 2-3 x daily - 7 x weekly - 3 sets - 10 reps  - Supine Shoulder External Rotation with Dowel  - 2-3 x daily - 7 x weekly - 2-3 sets - 10 reps - 3 hold    Date 6/2 5/29 5/22 5/19 5/15 5/12   Visit Number [phone]   Manuals         Shoulder PROM Flex, abd, ER All directions       GH mobilizations Post & inf gr. II-III Post gr. II-III                         Neuro Re-Ed          Scap retr         RTC isos  RTB AROM 25x ea  RTB AROM 25x ea RTB walkouts 10x ea    Prone I, T, Y         Standing GH horiz abd  RTB 2x10  RTB 2x10     Prone rows      Bent over rows 5lbs 1x15   Ball wall circles Red ball 30x cw/ccw Red ball 30 circles       SL ER  ER 2x10    Flex 2x10  ER 2x10    Flex 2x10 ER 2x10    Flex 2x10 2x10    SL abd 2x10   Standing GH flex     In doorway no scap assist 2x10 In doorway w/scap assist 2x10   Supine ABC         Standing GH abd & ER      ER 2x10   90/90 ER table supported 1x10 no weight    2x10 w/2lbs ER 1x10 no weight, 2x10 w/2lbs    IR RTB 2x10  ER 1x10 no weight, 2x10 w/2lbs    IR RTB 2x10 ER 2x10 w/2lbs, 1x10 no weight    IR RTB 2x10   Not at 90 flex due to biceps discomfort 2x10   bodyblade     Neutral 3x30s Neutral 1x30s   D2 at wall 2x10 w/VC 2x10  W/TC 2x10     Standing GH ext         Ther Ex         pendulums         Table slides         Supine cane ER PROM 2x10   3x10 2x10 2x10   Elbow AROM         Wrist extensor str         Supine GH flex PROM W/cane 2x10   W/opp UE 2x10 W/opp UE 2x10 W/opp UE 2x10   Standing functional ER         GH flex into horiz abd 1x15 in mirror    Abd into horiz add 1x15        Standing GH abd PROM         pulleys         BTB str w/SOS         Ther Activity                           Gait Training                           Modalities

## 2025-06-05 ENCOUNTER — OFFICE VISIT (OUTPATIENT)
Dept: PHYSICAL THERAPY | Facility: CLINIC | Age: 65
End: 2025-06-05
Payer: COMMERCIAL

## 2025-06-05 DIAGNOSIS — Z98.890 S/P RIGHT ROTATOR CUFF REPAIR: ICD-10-CM

## 2025-06-05 DIAGNOSIS — M25.511 ACUTE PAIN OF RIGHT SHOULDER: Primary | ICD-10-CM

## 2025-06-05 PROCEDURE — 97112 NEUROMUSCULAR REEDUCATION: CPT

## 2025-06-05 PROCEDURE — 97110 THERAPEUTIC EXERCISES: CPT

## 2025-06-05 NOTE — PROGRESS NOTES
Daily Note     Today's date: 2025  Patient name: Efren Connell  : 1960  MRN: [id number]  Referring provider: Rio Coffey*  Dx:   Encounter Diagnosis     ICD-10-CM    1. Acute pain of right shoulder  M25.511       2. S/P right rotator cuff repair  Z98.890           Start Time: 933  Stop Time: 1012  Total time in clinic (min): 39 minutes    Subjective: Pt says his shoulder is feeling better since last session. Pt does not report any pain after he mowed his lawn this week. Pt states he has been doing more with his R arm. Pt has no new complaints.      Objective: See treatment diary below      Assessment: Tolerated treatment well. Patient demonstrated fatigue post treatment, exhibited good technique with therapeutic exercises, and would benefit from continued PT. Updated HEP today to continue to increase pt's ROM and stabilization. Pt required tactile cueing to prevent trunk rotation and R shoulder hiking during ER and ball circles       Plan: Continue per plan of care.         Insurance:  AMA/CMS Eval/ Re-eval Auth #/ Referral # Total units or visits Start date  Expiration date KX? Visit limitation?  PT only or  PT+OT? Co-Insurance   AMA 25 NO AUTH  25  90 PCY  $0    3/17/25            4/21/25            5/22/25             POC Start Date POC Expiration Date Signed POC?   25 pending   3/17/25 5/12/25 pending   25 pending   5/22/25 7/3/25 pending      Date               Visits/Units: NO AUTH Used               Authed: NO AUTH Remaining                   Precautions: RTC Repair Protocol  Access Code: [email or web address]  URL: [email or web address]  Date: 2025  Prepared by: Luis Armando Whittaker    Exercises  - Seated Scapular Retraction  - 3-4 x daily - 7 x weekly - 3 sets - 10 reps  - Seated Shoulder Flexion Towel Slide at Table Top  - 2-3 x daily - 7 x weekly - 2-3 sets - 10 reps - 3 hold  - Seated Elbow Flexion and Extension AROM  - 2-3 x daily -  7 x weekly - 3 sets - 10 reps  - Flexion-Extension Shoulder Pendulum with Table Support  - 2-3 x daily - 7 x weekly - 3 sets - 10 reps  - Supine Shoulder External Rotation with Dowel  - 2-3 x daily - 7 x weekly - 2-3 sets - 10 reps - 3 hold    Date 6/5 6/2 5/29 5/22 5/19 5/15 5/12   Visit Number [phone]   Manuals          Shoulder PROM Flex, abd Flex, abd, ER All directions       GH mobilizations Post & inf gr. II-III Post & inf gr. II-III Post gr. II-III                           Neuro Re-Ed           Scap retr          RTC isos   RTB AROM 25x ea  RTB AROM 25x ea RTB walkouts 10x ea    Prone I, T, Y          Standing GH horiz abd   RTB 2x10  RTB 2x10     Prone rows       Bent over rows 5lbs 1x15   Ball wall circles  Red ball 30x cw/ccw Red ball 30 circles       SL ER   ER 2x10    Flex 2x10  ER 2x10    Flex 2x10 ER 2x10    Flex 2x10 2x10    SL abd 2x10   SL Abd 2x10         Standing GH flex      In doorway no scap assist 2x10 In doorway w/scap assist 2x10   Supine ABC          Standing GH abd & ER       ER 2x10   90/90 ER table supported 2x10 no weight   1x10 no weight    2x10 w/2lbs ER 1x10 no weight, 2x10 w/2lbs    IR RTB 2x10  ER 1x10 no weight, 2x10 w/2lbs    IR RTB 2x10 ER 2x10 w/2lbs, 1x10 no weight    IR RTB 2x10   Not at 90 flex due to biceps discomfort 2x10   bodyblade      Neutral 3x30s Neutral 1x30s   D2 at wall  2x10 w/VC 2x10  W/TC 2x10     Standing GH ext          Ther Ex          pendulums          Table slides          Supine cane ER PROM  2x10   3x10 2x10 2x10   Supine D2 2x10         Elbow AROM          Wrist extensor str          Supine GH flex PROM  W/cane 2x10   W/opp UE 2x10 W/opp UE 2x10 W/opp UE 2x10   Supine flex 1x15         Standing functional ER          GH flex into horiz abd 1x15 in mirror    Abd into horiz add 1x15 1x15 in mirror    Abd into horiz add 1x15        Standing GH abd PROM          pulleys Flex 2x10  Abd 2x10         BTB str w/SOS          Ther Activity                               Gait Training                              Modalities                                    Pt was treated by FRANCES Sylvester under the direct supervision of Luis Armando Whittaker DPT.

## 2025-06-09 ENCOUNTER — OFFICE VISIT (OUTPATIENT)
Dept: PHYSICAL THERAPY | Facility: CLINIC | Age: 65
End: 2025-06-09
Payer: COMMERCIAL

## 2025-06-09 DIAGNOSIS — M25.511 ACUTE PAIN OF RIGHT SHOULDER: Primary | ICD-10-CM

## 2025-06-09 DIAGNOSIS — Z98.890 S/P RIGHT ROTATOR CUFF REPAIR: ICD-10-CM

## 2025-06-09 PROCEDURE — 97112 NEUROMUSCULAR REEDUCATION: CPT

## 2025-06-09 PROCEDURE — 97110 THERAPEUTIC EXERCISES: CPT

## 2025-06-09 NOTE — PROGRESS NOTES
Daily Note     Today's date: 2025  Patient name: Efren Connell  : 1960  MRN: [id number]  Referring provider: Rio Coffey*  Dx:   Encounter Diagnosis     ICD-10-CM    1. Acute pain of right shoulder  M25.511       2. S/P right rotator cuff repair  Z98.890           Start Time: 934  Stop Time: 1015  Total time in clinic (min): 41 minutes    Subjective: Pt reports that the ball [place] exercise is still tough but he was still able to do it. Pt has been doing more work in his garden and felt good with doing more, feels like he is getting stronger, but has more soreness and stiffness today. Pt denies pain at rest prior to the start of her session.       Objective: See treatment diary below      Assessment: Tolerated treatment well. Patient demonstrated fatigue post treatment, exhibited good technique with therapeutic exercises, and would benefit from continued PT. Pt continues to exhibit R GH tightness as evidenced by continued trunk rotation during D2 movements.       Plan: Continue per plan of care.  Progress treatment as tolerated.          Insurance:  AMA/CMS Eval/ Re-eval Auth #/ Referral # Total units or visits Start date  Expiration date KX? Visit limitation?  PT only or  PT+OT? Co-Insurance   AMA 25 NO AUTH  25  90 PCY  $0    3/17/25            4/21/25            5/22/25             POC Start Date POC Expiration Date Signed POC?   25 yes   3/17/25 5/12/25 yes   25 yes   5/22/25 7/3/25 yes      Date               Visits/Units: NO AUTH Used               Authed: NO AUTH Remaining                   Precautions: RTC Repair Protocol  Access Code: [email or web address]  URL: [email or web address]  Date: 2025  Prepared by: Luis Armando Whittaker    Exercises  - Seated Scapular Retraction  - 3-4 x daily - 7 x weekly - 3 sets - 10 reps  - Seated Shoulder Flexion Towel Slide at Table Top  - 2-3 x daily - 7 x weekly - 2-3 sets - 10 reps - 3 hold  - Seated  Elbow Flexion and Extension AROM  - 2-3 x daily - 7 x weekly - 3 sets - 10 reps  - Flexion-Extension Shoulder Pendulum with Table Support  - 2-3 x daily - 7 x weekly - 3 sets - 10 reps  - Supine Shoulder External Rotation with Dowel  - 2-3 x daily - 7 x weekly - 2-3 sets - 10 reps - 3 hold    Date 6/9 6/5 6/2 5/29 5/22 5/19   Visit Number [phone]   Manuals         Shoulder PROM Flex, abd Flex, abd Flex, abd, ER All directions     GH mobilizations Post & inf gr. II-III Post & inf gr. II-III Post & inf gr. II-III Post gr. II-III                       Neuro Re-Ed          Scap retr         RTC isos RTB 2x10 ER & IR   RTB AROM 25x ea  RTB AROM 25x ea   Prone I, T, Y         Standing GH horiz abd    RTB 2x10  RTB 2x10   Prone rows         Ball wall circles   Red ball 30x cw/ccw Red ball 30 circles     SL ER    ER 2x10    Flex 2x10  ER 2x10    Flex 2x10   SL Abd 2x10 2x10       Standing GH flex         Supine ABC         Standing GH abd & ER         90/90 ER table supported 2x10 no weight    2x10 w/2lbs 2x10 no weight   1x10 no weight    2x10 w/2lbs ER 1x10 no weight, 2x10 w/2lbs    IR RTB 2x10  ER 1x10 no weight, 2x10 w/2lbs    IR RTB 2x10   bodyblade Neutral 3x30s        D2 at wall 2x10 w/VC  2x10 w/VC 2x10  W/TC 2x10   Standing GH ext         Ther Ex         pendulums         Table slides         Supine cane ER PROM   2x10   3x10   Supine D2 2x10 2x10       Elbow AROM         Wrist extensor str         Supine GH flex PROM   W/cane 2x10   W/opp UE 2x10   Supine flex 2x10 1x15       Standing functional ER         GH flex into horiz abd 2x10 in mirror    Abd into horiz add 2x10 1x15 in mirror    Abd into horiz add 1x15 1x15 in mirror    Abd into horiz add 1x15      Standing GH abd PROM         pulleys  Flex 2x10  Abd 2x10       BTB str w/SOS         Ther Activity                           Gait Training                           Modalities                                 Pt was treated by Sabine Jimenez, SPT under  the direct supervision of Luis Armando Whittaker DPT.

## 2025-06-12 ENCOUNTER — OFFICE VISIT (OUTPATIENT)
Dept: PHYSICAL THERAPY | Facility: CLINIC | Age: 65
End: 2025-06-12
Payer: COMMERCIAL

## 2025-06-12 DIAGNOSIS — Z98.890 S/P RIGHT ROTATOR CUFF REPAIR: ICD-10-CM

## 2025-06-12 DIAGNOSIS — M25.511 ACUTE PAIN OF RIGHT SHOULDER: Primary | ICD-10-CM

## 2025-06-12 PROCEDURE — 97110 THERAPEUTIC EXERCISES: CPT

## 2025-06-12 PROCEDURE — 97112 NEUROMUSCULAR REEDUCATION: CPT

## 2025-06-12 PROCEDURE — 97140 MANUAL THERAPY 1/> REGIONS: CPT

## 2025-06-12 NOTE — PROGRESS NOTES
Daily Note     Today's date: 2025  Patient name: Efren Connell  : 1960  MRN: [id number]  Referring provider: Rio Coffey*  Dx:   Encounter Diagnosis     ICD-10-CM    1. Acute pain of right shoulder  M25.511       2. S/P right rotator cuff repair  Z98.890           Start Time: 0932  Stop Time: 1016  Total time in clinic (min): 44 minutes    Subjective: Pt reports that his shoulder is very tight today, he did some yard work yesterday and felt good doing it but it was worse afterward.      Objective: See treatment diary below      Assessment: Tolerated treatment well. Patient demonstrated fatigue post treatment, exhibited good technique with therapeutic exercises, and would benefit from continued PT. Pt demonstrated restrictions in his IR A/PROM. Initiated more functional IR stretching and functional AROM. Pt was instructed to perform his IR stretch at home in addition to his current HEP.       Plan: Continue per plan of care.  Progress treatment as tolerated.          Insurance:  AMA/CMS Eval/ Re-eval Auth #/ Referral # Total units or visits Start date  Expiration date KX? Visit limitation?  PT only or  PT+OT? Co-Insurance   AMA 25 NO AUTH  25  90 PCY  $0    3/17/25            4/21/25            5/22/25             POC Start Date POC Expiration Date Signed POC?   25 yes   3/17/25 5/12/25 yes   25 yes   5/22/25 7/3/25 yes      Date               Visits/Units: NO AUTH Used               Authed: NO AUTH Remaining                   Precautions: RTC Repair Protocol  Access Code: [email or web address]  URL: [email or web address]  Date: 2025  Prepared by: Luis Armando Whittaker    Exercises  - Seated Scapular Retraction  - 3-4 x daily - 7 x weekly - 3 sets - 10 reps  - Seated Shoulder Flexion Towel Slide at Table Top  - 2-3 x daily - 7 x weekly - 2-3 sets - 10 reps - 3 hold  - Seated Elbow Flexion and Extension AROM  - 2-3 x daily - 7 x weekly - 3 sets -  10 reps  - Flexion-Extension Shoulder Pendulum with Table Support  - 2-3 x daily - 7 x weekly - 3 sets - 10 reps  - Supine Shoulder External Rotation with Dowel  - 2-3 x daily - 7 x weekly - 2-3 sets - 10 reps - 3 hold    Date 6/12 6/9 6/5 6/2 5/29 5/22   Visit Number [phone]   Manuals         Shoulder PROM Flex, abd, ER, IR Flex, abd Flex, abd Flex, abd, ER All directions    GH mobilizations Post gr. III Post & inf gr. II-III Post & inf gr. II-III Post & inf gr. II-III Post gr. II-III                      Neuro Re-Ed          Scap retr         RTC isos  RTB 2x10 ER & IR   RTB AROM 25x ea    Prone I, T, Y         Standing GH horiz abd     RTB 2x10    Prone rows [place] rows 2 ways 4.4 1x15 ea        Ball wall circles    Red ball 30x cw/ccw Red ball 30 circles    SL ER     ER 2x10    Flex 2x10    SL Abd Standing w/visual cues 1x15 no weight, 1x15 1lb 2x10 2x10      Standing GH flex         Supine ABC         Standing GH abd & ER         90/90 ER table supported  2x10 no weight    2x10 w/2lbs 2x10 no weight   1x10 no weight    2x10 w/2lbs ER 1x10 no weight, 2x10 w/2lbs    IR RTB 2x10    bodyblade Neutral 3x30s  1x90 flex 30s Neutral 3x30s       D2 at wall  2x10 w/VC  2x10 w/VC 2x10    Standing GH ext         Ther Ex         pendulums         Table slides         Supine cane ER PROM    2x10     Supine D2  2x10 2x10      Elbow AROM         Wrist extensor str         Supine GH flex PROM    W/cane 2x10     Supine flex Standing agus flex 1.3 3x10 2x10 1x15      Standing functional ER ER into IR 2x10        GH flex into horiz abd  2x10 in mirror    Abd into horiz add 2x10 1x15 in mirror    Abd into horiz add 1x15 1x15 in mirror    Abd into horiz add 1x15     Standing IR BTB str W/SOS 2x10, 5s hold        Standing GH abd PROM         pulleys   Flex 2x10  Abd 2x10      BTB str w/SOS         Ther Activity                           Gait Training                           Modalities

## 2025-06-16 ENCOUNTER — OFFICE VISIT (OUTPATIENT)
Dept: PHYSICAL THERAPY | Facility: CLINIC | Age: 65
End: 2025-06-16
Payer: COMMERCIAL

## 2025-06-16 DIAGNOSIS — M25.511 ACUTE PAIN OF RIGHT SHOULDER: Primary | ICD-10-CM

## 2025-06-16 DIAGNOSIS — Z98.890 S/P RIGHT ROTATOR CUFF REPAIR: ICD-10-CM

## 2025-06-16 PROCEDURE — 97110 THERAPEUTIC EXERCISES: CPT

## 2025-06-16 PROCEDURE — 97112 NEUROMUSCULAR REEDUCATION: CPT

## 2025-06-16 NOTE — PROGRESS NOTES
Daily Note     Today's date: 2025  Patient name: Efren Connell  : 1960  MRN: [id number]  Referring provider: Rio Coffey*  Dx:   Encounter Diagnosis     ICD-10-CM    1. Acute pain of right shoulder  M25.511       2. S/P right rotator cuff repair  Z98.890           Start Time: 0936  Stop Time: 1018  Total time in clinic (min): 42 minutes    Subjective: Pt reports that his shoulder has been feeling good, he hasn't really noticed any pain recently with it. Only thing he feels typically is some soreness. No new complaints. Pt forgot to do his BTB IR stretch over the weekend.       Objective: See treatment diary below      Assessment: Tolerated treatment well. Patient demonstrated fatigue post treatment, exhibited good technique with therapeutic exercises, and would benefit from continued PT      Plan: Continue per plan of care.  Progress treatment as tolerated.          Insurance:  AMA/CMS Eval/ Re-eval Auth #/ Referral # Total units or visits Start date  Expiration date KX? Visit limitation?  PT only or  PT+OT? Co-Insurance   AMA 25 NO AUTH  25  90 PCY  $0    3/17/25            4/21/25            5/22/25             POC Start Date POC Expiration Date Signed POC?   25 yes   3/17/25 5/12/25 yes   25 yes   5/22/25 7/3/25 yes      Date               Visits/Units: NO AUTH Used               Authed: NO AUTH Remaining                   Precautions: RTC Repair Protocol  Access Code: [email or web address]  URL: [email or web address]  Date: 2025  Prepared by: Luis Armando Whittaker    Exercises  - Seated Scapular Retraction  - 3-4 x daily - 7 x weekly - 3 sets - 10 reps  - Seated Shoulder Flexion Towel Slide at Table Top  - 2-3 x daily - 7 x weekly - 2-3 sets - 10 reps - 3 hold  - Seated Elbow Flexion and Extension AROM  - 2-3 x daily - 7 x weekly - 3 sets - 10 reps  - Flexion-Extension Shoulder Pendulum with Table Support  - 2-3 x daily - 7 x weekly - 3 sets  - 10 reps  - Supine Shoulder External Rotation with Dowel  - 2-3 x daily - 7 x weekly - 2-3 sets - 10 reps - 3 hold    Date 6/16 6/12 6/9 6/5 6/2 5/29   Visit Number [phone]   Manuals         Shoulder PROM  Flex, abd, ER, IR Flex, abd Flex, abd Flex, abd, ER All directions   GH mobilizations  Post gr. III Post & inf gr. II-III Post & inf gr. II-III Post & inf gr. II-III Post gr. II-III                     Neuro Re-Ed          Scap retr         RTC isos   RTB 2x10 ER & IR   RTB AROM 25x ea   Prone I, T, Y         Standing GH horiz abd      RTB 2x10   Prone rows  [place] rows 2 ways 4.4 1x15 ea       Ball wall circles     Red ball 30x cw/ccw Red ball 30 circles   SL ER      ER 2x10    Flex 2x10   SL Abd SL no weight 1x10    SL 2lbs 2x10 Standing w/visual cues 1x15 no weight, 1x15 1lb 2x10 2x10     Standing GH flex         Supine ABC Standing RTB 1x 2 ways        Standing GH abd & ER         90/90 ER table supported   2x10 no weight    2x10 w/2lbs 2x10 no weight   1x10 no weight    2x10 w/2lbs ER 1x10 no weight, 2x10 w/2lbs    IR RTB 2x10   bodyblade Neutral 2x30s  1x90 flex 30s Neutral 3x30s  1x90 flex 30s Neutral 3x30s      D2 at wall Supine 2x10 w/VC  2x10 w/VC  2x10 w/VC 2x10   Standing GH ext         Ther Ex         pendulums         Table slides         Supine cane ER PROM     2x10    Supine D2   2x10 2x10     Elbow AROM         Wrist extensor str         Supine GH flex PROM     W/cane 2x10    Supine flex Supine 3x10 w/3lbs    Standing flex 2lbs 2x10 Standing agus flex 1.3 3x10 2x10 1x15     Standing functional ER ER into IR 2x10 ER into IR 2x10       GH flex into horiz abd   2x10 in mirror    Abd into horiz add 2x10 1x15 in mirror    Abd into horiz add 1x15 1x15 in mirror    Abd into horiz add 1x15    Standing IR BTB str W/SOS 2x10, 5s hold W/SOS 2x10, 5s hold       Standing GH abd PROM         pulleys    Flex 2x10  Abd 2x10     BTB str w/SOS         Ther Activity                           Gait  Training                           Modalities

## 2025-06-19 ENCOUNTER — OFFICE VISIT (OUTPATIENT)
Dept: PHYSICAL THERAPY | Facility: CLINIC | Age: 65
End: 2025-06-19
Payer: COMMERCIAL

## 2025-06-19 DIAGNOSIS — M25.511 ACUTE PAIN OF RIGHT SHOULDER: Primary | ICD-10-CM

## 2025-06-19 DIAGNOSIS — Z98.890 S/P RIGHT ROTATOR CUFF REPAIR: ICD-10-CM

## 2025-06-19 PROCEDURE — 97112 NEUROMUSCULAR REEDUCATION: CPT

## 2025-06-19 PROCEDURE — 97110 THERAPEUTIC EXERCISES: CPT

## 2025-06-19 NOTE — PROGRESS NOTES
Daily Note     Today's date: 2025  Patient name: Efren Connell  : 1960  MRN: [id number]  Referring provider: Rio Coffey*  Dx:   Encounter Diagnosis     ICD-10-CM    1. Acute pain of right shoulder  M25.511       2. S/P right rotator cuff repair  Z98.890           Start Time: 935  Stop Time: 1015  Total time in clinic (min): 40 minutes    Subjective: Pt states that his shoulder is feeling pretty good overall today, he slept in his bed for the first time which felt different but wasn't painful. He was a little sore after his last session but overall feels good. No new complaints.       Objective: See treatment diary below      Assessment: Tolerated treatment well. Patient demonstrated fatigue post treatment, exhibited good technique with therapeutic exercises, and would benefit from continued PT      Plan: Continue per plan of care.  Progress treatment as tolerated.          Insurance:  AMA/CMS Eval/ Re-eval Auth #/ Referral # Total units or visits Start date  Expiration date KX? Visit limitation?  PT only or  PT+OT? Co-Insurance   AMA 25 NO AUTH  25  90 PCY  $0    3/17/25            4/21/25            5/22/25             POC Start Date POC Expiration Date Signed POC?   25 yes   3/17/25 5/12/25 yes   25 yes   5/22/25 7/3/25 yes      Date               Visits/Units: NO AUTH Used               Authed: NO AUTH Remaining                   Precautions: RTC Repair Protocol  Access Code: [email or web address]  URL: [email or web address]  Date: 2025  Prepared by: Luis Armando Whittaker    Exercises  - Seated Scapular Retraction  - 3-4 x daily - 7 x weekly - 3 sets - 10 reps  - Seated Shoulder Flexion Towel Slide at Table Top  - 2-3 x daily - 7 x weekly - 2-3 sets - 10 reps - 3 hold  - Seated Elbow Flexion and Extension AROM  - 2-3 x daily - 7 x weekly - 3 sets - 10 reps  - Flexion-Extension Shoulder Pendulum with Table Support  - 2-3 x daily - 7 x weekly  - 3 sets - 10 reps  - Supine Shoulder External Rotation with Dowel  - 2-3 x daily - 7 x weekly - 2-3 sets - 10 reps - 3 hold    Date 6/19 6/16 6/12 6/9 6/5 6/2   Visit Number [phone]   Manuals         Shoulder PROM   Flex, abd, ER, IR Flex, abd Flex, abd Flex, abd, ER   GH mobilizations   Post gr. III Post & inf gr. II-III Post & inf gr. II-III Post & inf gr. II-III                     Neuro Re-Ed          Scap retr         RTC isos    RTB 2x10 ER & IR     Prone I, T, Y         Standing GH horiz abd         Prone rows Upright rows black 2x10  [place] rows 2 ways 4.4 1x15 ea      Ball wall circles      Red ball 30x cw/ccw   SL ER         SL Abd SL 2lbs 2x10 SL no weight 1x10    SL 2lbs 2x10 Standing w/visual cues 1x15 no weight, 1x15 1lb 2x10 2x10    Standing GH flex         Supine ABC  Standing RTB 1x 2 ways       Standing GH abd & ER Horiz abd orange 2x10        90/90 ER table supported    2x10 no weight    2x10 w/2lbs 2x10 no weight   1x10 no weight    2x10 w/2lbs   bodyblade 2x30s @90 flex Neutral 2x30s  1x90 flex 30s Neutral 3x30s  1x90 flex 30s Neutral 3x30s     D2 at wall  Supine 2x10 w/VC  2x10 w/VC  2x10 w/VC   Standing GH ext         Ther Ex         pendulums         Table slides         Supine cane ER PROM      2x10   Supine D2    2x10 2x10    Elbow AROM         Wrist extensor str         Supine GH flex PROM      W/cane 2x10   Supine flex W/cane 2x10    Supine 2x10 w/3lbs Supine 3x10 w/3lbs    Standing flex 2lbs 2x10 Standing anisha flex 1.3 3x10 2x10 1x15    Standing functional ER ER into IR 2x10 ER into IR 2x10 ER into IR 2x10      GH flex into horiz abd    2x10 in mirror    Abd into horiz add 2x10 1x15 in mirror    Abd into horiz add 1x15 1x15 in mirror    Abd into horiz add 1x15   Standing IR BTB str W/SOS 2x10, 5s hold W/SOS 2x10, 5s hold W/SOS 2x10, 5s hold      Standing GH abd PROM         pulleys     Flex 2x10  Abd 2x10    BTB str w/SOS         Ther Activity                           Gait  Training                           Modalities

## 2025-06-23 ENCOUNTER — OFFICE VISIT (OUTPATIENT)
Dept: PHYSICAL THERAPY | Facility: CLINIC | Age: 65
End: 2025-06-23
Payer: COMMERCIAL

## 2025-06-23 DIAGNOSIS — M25.511 ACUTE PAIN OF RIGHT SHOULDER: Primary | ICD-10-CM

## 2025-06-23 DIAGNOSIS — Z98.890 S/P RIGHT ROTATOR CUFF REPAIR: ICD-10-CM

## 2025-06-23 PROCEDURE — 97110 THERAPEUTIC EXERCISES: CPT

## 2025-06-23 PROCEDURE — 97112 NEUROMUSCULAR REEDUCATION: CPT

## 2025-06-23 NOTE — PROGRESS NOTES
Daily Note     Today's date: 2025  Patient name: Efren Connell  : 1960  MRN: [id number]  Referring provider: Rio Coffey*  Dx:   Encounter Diagnosis     ICD-10-CM    1. Acute pain of right shoulder  M25.511       2. S/P right rotator cuff repair  Z98.890           Start Time: 09  Stop Time: 1015  Total time in clinic (min): 40 minutes    Subjective: Pt reports that he was doing pretty well with his shoulder over the weekend, had some mild soreness for 1.5 days but feels fine today. Pt feels tighter this morning than the other day but no other issues.       Objective: See treatment diary below      Assessment: Tolerated treatment well. Patient demonstrated fatigue post treatment, exhibited good technique with therapeutic exercises, and would benefit from continued PT      Plan: Continue per plan of care.  Progress treatment as tolerated.          Insurance:  AMA/CMS Eval/ Re-eval Auth #/ Referral # Total units or visits Start date  Expiration date KX? Visit limitation?  PT only or  PT+OT? Co-Insurance   AMA 25 NO AUTH  25  90 PCY  $0    3/17/25            4/21/25            5/22/25             POC Start Date POC Expiration Date Signed POC?   25 yes   3/17/25 5/12/25 yes   25 yes   5/22/25 7/3/25 yes      Date               Visits/Units: NO AUTH Used               Authed: NO AUTH Remaining                   Precautions: RTC Repair Protocol  Access Code: [email or web address]  URL: [email or web address]  Date: 2025  Prepared by: Luis Armando Whittaker    Exercises  - Seated Scapular Retraction  - 3-4 x daily - 7 x weekly - 3 sets - 10 reps  - Seated Shoulder Flexion Towel Slide at Table Top  - 2-3 x daily - 7 x weekly - 2-3 sets - 10 reps - 3 hold  - Seated Elbow Flexion and Extension AROM  - 2-3 x daily - 7 x weekly - 3 sets - 10 reps  - Flexion-Extension Shoulder Pendulum with Table Support  - 2-3 x daily - 7 x weekly - 3 sets - 10 reps  - Supine  Shoulder External Rotation with Dowel  - 2-3 x daily - 7 x weekly - 2-3 sets - 10 reps - 3 hold    Date 6/23 6/19 6/16 6/12 6/9   Visit Number [phone]   Manuals        Shoulder PROM    Flex, abd, ER, IR Flex, abd   GH mobilizations    Post gr. III Post & inf gr. II-III                   Neuro Re-Ed         Scap retr        RTC isos     RTB 2x10 ER & IR   Prone I, T, Y        Standing GH horiz abd        Prone rows Upright rows orange 2x10 Upright rows black 2x10  [place] rows 2 ways 4.4 1x15 ea    Ball wall circles        SL ER No weight 2x10    W/3lbs 2x10       SL Abd SL no weight 1x10, 2lbs 2x10 SL 2lbs 2x10 SL no weight 1x10    SL 2lbs 2x10 Standing w/visual cues 1x15 no weight, 1x15 1lb 2x10   Standing GH flex        Supine ABC Standing RTB 1x 2 ways  Standing RTB 1x 2 ways     Standing GH abd & ER Horiz abd orange 1x10 normal, 1x10 w/contra press Horiz abd orange 2x10      90/90 ER table supported     2x10 no weight    2x10 w/2lbs   bodyblade  2x30s @90 flex Neutral 2x30s  1x90 flex 30s Neutral 3x30s  1x90 flex 30s Neutral 3x30s   D2 at wall Supine w/1lb 2x10  Supine 2x10 w/VC  2x10 w/VC   Standing GH ext        Ther Ex        pendulums        Table slides        Supine cane ER PROM        Supine D2     2x10   Elbow AROM        Wrist extensor str        Supine GH flex PROM        Supine flex  W/cane 2x10    Supine 2x10 w/3lbs Supine 3x10 w/3lbs    Standing flex 2lbs 2x10 Standing agus flex 1.3 3x10 2x10   Standing functional ER ER & IR 2x10 ea ER into IR 2x10 ER into IR 2x10 ER into IR 2x10    GH flex into horiz abd     2x10 in mirror    Abd into horiz add 2x10   Standing IR BTB str W/SOS 2x10    Functional ER str w/SOS 2x10 W/SOS 2x10, 5s hold W/SOS 2x10, 5s hold W/SOS 2x10, 5s hold    Standing GH abd PROM        pulleys        BTB str w/SOS        Ther Activity                        Gait Training                        Modalities

## 2025-06-26 ENCOUNTER — APPOINTMENT (OUTPATIENT)
Dept: PHYSICAL THERAPY | Facility: CLINIC | Age: 65
End: 2025-06-26
Payer: COMMERCIAL

## 2025-06-30 ENCOUNTER — EVALUATION (OUTPATIENT)
Dept: PHYSICAL THERAPY | Facility: CLINIC | Age: 65
End: 2025-06-30
Payer: COMMERCIAL

## 2025-06-30 DIAGNOSIS — Z98.890 S/P RIGHT ROTATOR CUFF REPAIR: ICD-10-CM

## 2025-06-30 DIAGNOSIS — M25.511 ACUTE PAIN OF RIGHT SHOULDER: Primary | ICD-10-CM

## 2025-06-30 PROCEDURE — 97110 THERAPEUTIC EXERCISES: CPT

## 2025-06-30 NOTE — LETTER
2025    Rio Coffey MD  [address] [place] [id number]    Patient: Efren Connell   YOB: 1960   Date of Visit: 2025     Encounter Diagnosis     ICD-10-CM    1. Acute pain of right shoulder  M25.511       2. S/P right rotator cuff repair  Z98.890           Dear Dr. Rio Coffey MD:    Thank you for your recent referral of Efren Connell. Please review the attached evaluation summary from Efren's recent visit.     Please verify that you agree with the plan of care by signing the attached order.     If you have any questions or concerns, please do not hesitate to call.     I sincerely appreciate the opportunity to share in the care of one of your patients and hope to have another opportunity to work with you in the near future.       Sincerely,    Luis Armando Whittaker, PT      Referring Provider:      I certify that I have read the below Plan of Care and certify the need for these services furnished under this plan of treatment while under my care.                    Rio Coffey MD  [address] [place] [id number]  Via Fax: [phone]          PT Re-Evaluation     Today's date: 2025  Patient name: Efren Connell  : 1960  MRN: [id number]  Referring provider: Rio Coffey*  Dx:   Encounter Diagnosis     ICD-10-CM    1. Acute pain of right shoulder  M25.511       2. S/P right rotator cuff repair  Z98.890                   Start Time: 933  Stop Time: 1011  Total time in clinic (min): 38 minutes    Assessment  Impairments: abnormal or restricted ROM, abnormal movement, activity intolerance, impaired physical strength, pain with function, poor posture , poor body mechanics and endurance  Symptom irritability: low    Assessment details: Pt is a 64 y.o male who presents to skilled physical therapy for his 40th visit, less than 5 months s/p R RTC repair and shoulder debridement, performed on  2/6/25. Pt arrives with fair posture, slightly increased R shoulder A/PROM, normalized elbow AROM and strength, and slightly increased shoulder MMT. Pt continues to demonstrate some scapular hiking towards his end range shoulder flexion, especially as he fatigues. Pt continues to slowly progress through his surgical protocol, however ROM and strength deficits persist as well as mechanical deficits. Pt's continued pain and deficits are preventing him from performing self care, ADLs, recreational activities, and work duties without compensations. Pt was re-educated on his diagnosis, prognosis, POC, HEP, and surgical precautions/contraindications. Pt would continue to benefit from skilled physical therapy to reduce his pain, address his deficits, progress towards his goals, work through his post-surgical protocol, and return to his PLOF.   Understanding of Dx/Px/POC: good     Prognosis: good    Goals  Short Term Goals:  1) Pt will initiate and progress his HEP in 3-4 weeks.- Met  2) Pt will improve his sitting and standing posture to good in 3-4 weeks.- Progressing  3) Pt will improve his PROM shoulder flexion to at least 90 degrees to improve self care in 3-4 weeks.- Met  4) Pt will initiate AROM shoulder exercises in 3-4 weeks to improve his ADLs.- Met    Long Term Goals:  1) Pt will be able to sleep for 6-8 hours without shoulder pain in 6-8 weeks.- Met  2) Pt will be able to get dressed with less than 2/10 pain in 6-8 weeks.- Met  3) Pt will be able to put on a coat with less than 2/10 pain in 6-8 weeks.- Partially Met  4) Pt will be able to reach a shelf at or above shoulder height with less than 3/10 pain in 8-12 weeks.- Progressing  5) Pt will be able to perform his ADLs with less than 3/10 pain in 8-12 weeks.- Progressing    Plan  Patient would benefit from: skilled physical therapy and PT eval  Planned modality interventions: cryotherapy and thermotherapy: hydrocollator packs    Planned therapy interventions:  activity modification, joint mobilization, ADL retraining, manual therapy, body mechanics training, motor coordination training, neuromuscular re-education, patient/caregiver education, coordination, postural training, strengthening, stretching, therapeutic activities, flexibility, functional ROM exercises, graded exercise and home exercise program    Frequency: 2x week  Duration in weeks: 6  Plan of Care beginning date: 2025  Plan of Care expiration date: 2025  Treatment plan discussed with: patient        Subjective Evaluation    History of Present Illness  Date of surgery: 2025  Mechanism of injury: surgery  Mechanism of injury: 25 Update:  Pt feels like his shoulder is doing well, he hasn't noticed as much pain in his shoulder over the last several weeks. Pt feel fine with his ROM right now. He is happy with his progress overall, he doesn't really think about his shoulder as much anymore. Pt doesn't feel limited necessarily but he isn't doing any heavy lifting. Pt has been dealing with some increased stiffness in his shoulder over the past few days as he was away for work and wasn't able to do his normal stretching routine. Pt was told he no longer needs to go back to Dr. Coffey unless he has any issues.   Patient Goals  Patient goals for therapy: decreased pain, increased motion, increased strength, return to sport/leisure activities, independence with ADLs/IADLs and return to work  Patient goal: lift overhead  Pain  Current pain ratin  At best pain ratin  At worst pain rating: 3 (reaching overhead to get his ties on a shelf overhead)  Location: anterior R shoulder  Quality: tight and dull ache  Aggravating factors: lifting and overhead activity  Progression: improved    Social Support  Steps to enter house: yes  Stairs in house: yes   Lives in: multiple-level home  Lives with: spouse    Employment status: working  Hand dominance: right  Exercise history:  pendulums          Objective     Static Posture     Head  Forward.    Shoulders  Asymmetric shoulders, elevated and rounded.    Postural Observations  Seated posture: fair  Standing posture: fair  Correction of posture: has no consistent effect      Tenderness     Right Shoulder  No tenderness in the AC joint, acromion, biceps tendon (proximal), infraspinatus tendon and supraspinatus tendon.     Active Range of Motion     Right Shoulder   Flexion: 125 degrees   Abduction: 110 (tight) degrees   External rotation BTH: C7   Internal rotation BTB: Active internal rotation behind the back: sacrum.     Right Elbow   Flexion: WFL  Extension: WFL    Passive Range of Motion     Right Shoulder   Flexion: 152 degrees   Abduction: 114 (tight) degrees   External rotation 45°: 66 (tight/almost painful) degrees   External rotation 90°: 56 degrees   Internal rotation 45°: 47 degrees   Internal rotation 90°: 22 degrees     Strength/Myotome Testing     Left Shoulder     Planes of Motion   Flexion: 4+   Abduction: 4+     Right Shoulder     Planes of Motion   Flexion: 3+   Abduction: 3+   External rotation at 0°: 4   Internal rotation at 0°: 4     Left Elbow   Flexion: 5  Extension: 5    Right Elbow   Flexion: 5  Extension: 5                Insurance:  AMA/CMS Eval/ Re-eval Auth #/ Referral # Total units or visits Start date  Expiration date KX? Visit limitation?  PT only or  PT+OT? Co-Insurance   AMA 2/20/25 NO AUTH  2/20/25 12/31/25  90 PCY  $0    3/17/25            4/21/25            5/22/25            6/30/25             POC Start Date POC Expiration Date Signed POC?   2/20/25 4/17/25 pending   3/17/25 5/12/25 pending   4/21/25 6/2/25 pending   5/22/25 7/3/25 pending   6/30/25 8/11/25 pending      Date               Visits/Units: NO AUTH Used               Authed: NO AUTH Remaining                   Precautions: RTC Repair Protocol  Access Code: [email or web address]  URL: [email or web address]  Date: 02/20/2025  Prepared by:  Luis Armando Whittaker    Exercises  - Seated Scapular Retraction  - 3-4 x daily - 7 x weekly - 3 sets - 10 reps  - Seated Shoulder Flexion Towel Slide at Table Top  - 2-3 x daily - 7 x weekly - 2-3 sets - 10 reps - 3 hold  - Seated Elbow Flexion and Extension AROM  - 2-3 x daily - 7 x weekly - 3 sets - 10 reps  - Flexion-Extension Shoulder Pendulum with Table Support  - 2-3 x daily - 7 x weekly - 3 sets - 10 reps  - Supine Shoulder External Rotation with Dowel  - 2-3 x daily - 7 x weekly - 2-3 sets - 10 reps - 3 hold      Date 6/30 6/23 6/19 6/16 6/12 6/9   Visit Number [phone]   Manuals         Shoulder PROM     Flex, abd, ER, IR Flex, abd   GH mobilizations     Post gr. III Post & inf gr. II-III                     Neuro Re-Ed          Scap retr         RTC isos      RTB 2x10 ER & IR   Prone I, T, Y         Standing GH horiz abd         Prone rows  Upright rows orange 2x10 Upright rows black 2x10  [place] rows 2 ways 4.4 1x15 ea    Ball wall circles         SL ER  No weight 2x10    W/3lbs 2x10       SL Abd  SL no weight 1x10, 2lbs 2x10 SL 2lbs 2x10 SL no weight 1x10    SL 2lbs 2x10 Standing w/visual cues 1x15 no weight, 1x15 1lb 2x10   Standing GH flex         Supine ABC  Standing RTB 1x 2 ways  Standing RTB 1x 2 ways     Standing GH abd & ER  Horiz abd orange 1x10 normal, 1x10 w/contra press Horiz abd orange 2x10      90/90 ER table supported      2x10 no weight    2x10 w/2lbs   bodyblade   2x30s @90 flex Neutral 2x30s  1x90 flex 30s Neutral 3x30s  1x90 flex 30s Neutral 3x30s   D2 at wall  Supine w/1lb 2x10  Supine 2x10 w/VC  2x10 w/VC   Standing GH ext         Ther Ex         pendulums         Table slides         Supine cane ER PROM         Supine D2      2x10   Elbow AROM         Wrist extensor str         Supine GH flex PROM         Supine flex   W/cane 2x10    Supine 2x10 w/3lbs Supine 3x10 w/3lbs    Standing flex 2lbs 2x10 Standing agus flex 1.3 3x10 2x10   Standing functional ER  ER & IR 2x10 ea ER  into IR 2x10 ER into IR 2x10 ER into IR 2x10    GH flex into horiz abd      2x10 in mirror    Abd into horiz add 2x10   Standing IR BTB str  W/SOS 2x10    Functional ER str w/SOS 2x10 W/SOS 2x10, 5s hold W/SOS 2x10, 5s hold W/SOS 2x10, 5s hold    Standing GH abd PROM         pulleys         BTB str w/SOS         Ther Activity                           Gait Training                           Modalities

## 2025-06-30 NOTE — PROGRESS NOTES
PT Re-Evaluation     Today's date: 2025  Patient name: Efren Connell  : 1960  MRN: [id number]  Referring provider: Rio Coffey*  Dx:   Encounter Diagnosis     ICD-10-CM    1. Acute pain of right shoulder  M25.511       2. S/P right rotator cuff repair  Z98.890                   Start Time: 933  Stop Time: 1011  Total time in clinic (min): 38 minutes    Assessment  Impairments: abnormal or restricted ROM, abnormal movement, activity intolerance, impaired physical strength, pain with function, poor posture , poor body mechanics and endurance  Symptom irritability: low    Assessment details: Pt is a 64 y.o male who presents to skilled physical therapy for his 40th visit, less than 5 months s/p R RTC repair and shoulder debridement, performed on 25. Pt arrives with fair posture, slightly increased R shoulder A/PROM, normalized elbow AROM and strength, and slightly increased shoulder MMT. Pt continues to demonstrate some scapular hiking towards his end range shoulder flexion, especially as he fatigues. Pt continues to slowly progress through his surgical protocol, however ROM and strength deficits persist as well as mechanical deficits. Pt's continued pain and deficits are preventing him from performing self care, ADLs, recreational activities, and work duties without compensations. Pt was re-educated on his diagnosis, prognosis, POC, HEP, and surgical precautions/contraindications. Pt would continue to benefit from skilled physical therapy to reduce his pain, address his deficits, progress towards his goals, work through his post-surgical protocol, and return to his PLOF.   Understanding of Dx/Px/POC: good     Prognosis: good    Goals  Short Term Goals:  1) Pt will initiate and progress his HEP in 3-4 weeks.- Met  2) Pt will improve his sitting and standing posture to good in 3-4 weeks.- Progressing  3) Pt will improve his PROM shoulder flexion to at least 90 degrees to improve self care  in 3-4 weeks.- Met  4) Pt will initiate AROM shoulder exercises in 3-4 weeks to improve his ADLs.- Met    Long Term Goals:  1) Pt will be able to sleep for 6-8 hours without shoulder pain in 6-8 weeks.- Met  2) Pt will be able to get dressed with less than 2/10 pain in 6-8 weeks.- Met  3) Pt will be able to put on a coat with less than 2/10 pain in 6-8 weeks.- Partially Met  4) Pt will be able to reach a shelf at or above shoulder height with less than 3/10 pain in 8-12 weeks.- Progressing  5) Pt will be able to perform his ADLs with less than 3/10 pain in 8-12 weeks.- Progressing    Plan  Patient would benefit from: skilled physical therapy and PT eval  Planned modality interventions: cryotherapy and thermotherapy: hydrocollator packs    Planned therapy interventions: activity modification, joint mobilization, ADL retraining, manual therapy, body mechanics training, motor coordination training, neuromuscular re-education, patient/caregiver education, coordination, postural training, strengthening, stretching, therapeutic activities, flexibility, functional ROM exercises, graded exercise and home exercise program    Frequency: 2x week  Duration in weeks: 6  Plan of Care beginning date: 6/30/2025  Plan of Care expiration date: 8/11/2025  Treatment plan discussed with: patient        Subjective Evaluation    History of Present Illness  Date of surgery: 2/6/2025  Mechanism of injury: surgery  Mechanism of injury: 6/30/25 Update:  Pt feels like his shoulder is doing well, he hasn't noticed as much pain in his shoulder over the last several weeks. Pt feel fine with his ROM right now. He is happy with his progress overall, he doesn't really think about his shoulder as much anymore. Pt doesn't feel limited necessarily but he isn't doing any heavy lifting. Pt has been dealing with some increased stiffness in his shoulder over the past few days as he was away for work and wasn't able to do his normal stretching routine. Pt  was told he no longer needs to go back to Dr. Coffey unless he has any issues.   Patient Goals  Patient goals for therapy: decreased pain, increased motion, increased strength, return to sport/leisure activities, independence with ADLs/IADLs and return to work  Patient goal: lift overhead  Pain  Current pain ratin  At best pain ratin  At worst pain rating: 3 (reaching overhead to get his ties on a shelf overhead)  Location: anterior R shoulder  Quality: tight and dull ache  Aggravating factors: lifting and overhead activity  Progression: improved    Social Support  Steps to enter house: yes  Stairs in house: yes   Lives in: multiple-level home  Lives with: spouse    Employment status: working  Hand dominance: right  Exercise history: pendulums          Objective     Static Posture     Head  Forward.    Shoulders  Asymmetric shoulders, elevated and rounded.    Postural Observations  Seated posture: fair  Standing posture: fair  Correction of posture: has no consistent effect      Tenderness     Right Shoulder  No tenderness in the AC joint, acromion, biceps tendon (proximal), infraspinatus tendon and supraspinatus tendon.     Active Range of Motion     Right Shoulder   Flexion: 125 degrees   Abduction: 110 (tight) degrees   External rotation BTH: C7   Internal rotation BTB: Active internal rotation behind the back: sacrum.     Right Elbow   Flexion: WFL  Extension: WFL    Passive Range of Motion     Right Shoulder   Flexion: 152 degrees   Abduction: 114 (tight) degrees   External rotation 45°: 66 (tight/almost painful) degrees   External rotation 90°: 56 degrees   Internal rotation 45°: 47 degrees   Internal rotation 90°: 22 degrees     Strength/Myotome Testing     Left Shoulder     Planes of Motion   Flexion: 4+   Abduction: 4+     Right Shoulder     Planes of Motion   Flexion: 3+   Abduction: 3+   External rotation at 0°: 4   Internal rotation at 0°: 4     Left Elbow   Flexion: 5  Extension: 5    Right  Elbow   Flexion: 5  Extension: 5                Insurance:  AMA/CMS Eval/ Re-eval Auth #/ Referral # Total units or visits Start date  Expiration date KX? Visit limitation?  PT only or  PT+OT? Co-Insurance   AMA 2/20/25 NO AUTH  2/20/25 12/31/25  90 PCY  $0    3/17/25            4/21/25            5/22/25            6/30/25             POC Start Date POC Expiration Date Signed POC?   2/20/25 4/17/25 pending   3/17/25 5/12/25 pending   4/21/25 6/2/25 pending   5/22/25 7/3/25 pending   6/30/25 8/11/25 pending      Date               Visits/Units: NO AUTH Used               Authed: NO AUTH Remaining                   Precautions: RTC Repair Protocol  Access Code: [email or web address]  URL: [email or web address]  Date: 02/20/2025  Prepared by: Luis Armando Whittaker    Exercises  - Seated Scapular Retraction  - 3-4 x daily - 7 x weekly - 3 sets - 10 reps  - Seated Shoulder Flexion Towel Slide at Table Top  - 2-3 x daily - 7 x weekly - 2-3 sets - 10 reps - 3 hold  - Seated Elbow Flexion and Extension AROM  - 2-3 x daily - 7 x weekly - 3 sets - 10 reps  - Flexion-Extension Shoulder Pendulum with Table Support  - 2-3 x daily - 7 x weekly - 3 sets - 10 reps  - Supine Shoulder External Rotation with Dowel  - 2-3 x daily - 7 x weekly - 2-3 sets - 10 reps - 3 hold      Date 6/30 6/23 6/19 6/16 6/12 6/9   Visit Number [phone]   Manuals         Shoulder PROM     Flex, abd, ER, IR Flex, abd   GH mobilizations     Post gr. III Post & inf gr. II-III                     Neuro Re-Ed          Scap retr         RTC isos      RTB 2x10 ER & IR   Prone I, T, Y         Standing GH horiz abd         Prone rows  Upright rows orange 2x10 Upright rows black 2x10  [place] rows 2 ways 4.4 1x15 ea    Ball wall circles         SL ER  No weight 2x10    W/3lbs 2x10       SL Abd  SL no weight 1x10, 2lbs 2x10 SL 2lbs 2x10 SL no weight 1x10    SL 2lbs 2x10 Standing w/visual cues 1x15 no weight, 1x15 1lb 2x10   Standing GH flex         Supine ABC   Standing RTB 1x 2 ways  Standing RTB 1x 2 ways     Standing GH abd & ER  Horiz abd orange 1x10 normal, 1x10 w/contra press Horiz abd orange 2x10      90/90 ER table supported      2x10 no weight    2x10 w/2lbs   bodyblade   2x30s @90 flex Neutral 2x30s  1x90 flex 30s Neutral 3x30s  1x90 flex 30s Neutral 3x30s   D2 at wall  Supine w/1lb 2x10  Supine 2x10 w/VC  2x10 w/VC   Standing GH ext         Ther Ex         pendulums         Table slides         Supine cane ER PROM         Supine D2      2x10   Elbow AROM         Wrist extensor str         Supine GH flex PROM         Supine flex   W/cane 2x10    Supine 2x10 w/3lbs Supine 3x10 w/3lbs    Standing flex 2lbs 2x10 Standing agus flex 1.3 3x10 2x10   Standing functional ER  ER & IR 2x10 ea ER into IR 2x10 ER into IR 2x10 ER into IR 2x10    GH flex into horiz abd      2x10 in mirror    Abd into horiz add 2x10   Standing IR BTB str  W/SOS 2x10    Functional ER str w/SOS 2x10 W/SOS 2x10, 5s hold W/SOS 2x10, 5s hold W/SOS 2x10, 5s hold    Standing GH abd PROM         pulleys         BTB str w/SOS         Ther Activity                           Gait Training                           Modalities

## 2025-07-03 ENCOUNTER — OFFICE VISIT (OUTPATIENT)
Dept: PHYSICAL THERAPY | Facility: CLINIC | Age: 65
End: 2025-07-03
Payer: COMMERCIAL

## 2025-07-03 DIAGNOSIS — M25.511 ACUTE PAIN OF RIGHT SHOULDER: Primary | ICD-10-CM

## 2025-07-03 DIAGNOSIS — Z98.890 S/P RIGHT ROTATOR CUFF REPAIR: ICD-10-CM

## 2025-07-03 PROCEDURE — 97110 THERAPEUTIC EXERCISES: CPT | Performed by: PHYSICAL THERAPIST

## 2025-07-03 PROCEDURE — 97112 NEUROMUSCULAR REEDUCATION: CPT | Performed by: PHYSICAL THERAPIST

## 2025-07-03 NOTE — PROGRESS NOTES
Daily Note     Today's date: 7/3/2025  Patient name: Efren Connell  : 1960  MRN: [id number]  Referring provider: Rio Coffey*  Dx:   Encounter Diagnosis     ICD-10-CM    1. Acute pain of right shoulder  M25.511       2. S/P right rotator cuff repair  Z98.890                      Subjective: Efren Connell reports he is sore in the shoulder from new exercises last session.  Overall he feels he is able to do a little more with his shoulder.        Objective: See treatment diary below      Assessment: Tolerated treatment well. Patient demonstrated fatigue post treatment, exhibited good technique with therapeutic exercises, and would benefit from continued PT.  He requires consistent TCs to improve scapular stability, reduce overuse of biceps/middle delt and improve recruitment of posterior RTC.        Plan: Continue per plan of care.         Insurance:  A/CMS Eval/ Re-eval Auth #/ Referral # Total units or visits Start date  Expiration date KX? Visit limitation?  PT only or  PT+OT? Co-Insurance   AMA 25 NO AUTH  25  90 PCY  $0    3/17/25            4/21/25            5/22/25            6/30/25             POC Start Date POC Expiration Date Signed POC?   25 pending   3/17/25 5/12/25 pending   25 pending   5/22/25 7/3/25 pending   25 pending      Date               Visits/Units: NO AUTH Used               Authed: NO AUTH Remaining                   Precautions: RTC Repair Protocol  Access Code: [email or web address]  URL: [email or web address]  Date: 2025  Prepared by: Luis Armando Whittaker    Exercises  - Seated Scapular Retraction  - 3-4 x daily - 7 x weekly - 3 sets - 10 reps  - Seated Shoulder Flexion Towel Slide at Table Top  - 2-3 x daily - 7 x weekly - 2-3 sets - 10 reps - 3 hold  - Seated Elbow Flexion and Extension AROM  - 2-3 x daily - 7 x weekly - 3 sets - 10 reps  - Flexion-Extension Shoulder Pendulum with Table Support  - 2-3 x  daily - 7 x weekly - 3 sets - 10 reps  - Supine Shoulder External Rotation with Dowel  - 2-3 x daily - 7 x weekly - 2-3 sets - 10 reps - 3 hold      Date 7/3 6/30 6/23 6/19 6/16   Visit Number [phone]   Manuals        Shoulder PROM        GH mobilizations                        Neuro Re-Ed         Scap retr        RTC isos        Prone horiz abd in ER 20x       Standing GH horiz abd        Prone rows   Upright rows orange 2x10 Upright rows black 2x10    Ball wall circles 110 flex 30x cw.ccw       SL ER No weifht 20x  1lb 2x10  No weight 2x10    W/3lbs 2x10     SL Abd 2x10 0lb  SL no weight 1x10, 2lbs 2x10 SL 2lbs 2x10 SL no weight 1x10    SL 2lbs 2x10   Standing GH flex Flex/scap/abd  15x ea in mirror       Supine ABC   Standing RTB 1x 2 ways  Standing RTB 1x 2 ways   Standing GH abd & ER   Horiz abd orange 1x10 normal, 1x10 w/contra press Horiz abd orange 2x10    90/90 ER table supported        bodyblade    2x30s @90 flex Neutral 2x30s  1x90 flex 30s   D2 at wall Supine 1lb  2x10  Supine w/1lb 2x10  Supine 2x10 w/VC   Standing GH ext        Ther Ex        pendulums        Table slides        Supine cane ER PROM        Supine D2        Elbow AROM        Wrist extensor str        Supine GH flex PROM        Supine flex    W/cane 2x10    Supine 2x10 w/3lbs Supine 3x10 w/3lbs    Standing flex 2lbs 2x10   Standing functional ER   ER & IR 2x10 ea ER into IR 2x10 ER into IR 2x10   GH flex into horiz abd        Standing IR BTB str   W/SOS 2x10    Functional ER str w/SOS 2x10 W/SOS 2x10, 5s hold W/SOS 2x10, 5s hold   Standing GH abd PROM Ball slides 30x       pulleys        BTB str w/SOS 4x30s       Ther Activity                        Gait Training                        Modalities

## 2025-07-07 ENCOUNTER — OFFICE VISIT (OUTPATIENT)
Dept: PHYSICAL THERAPY | Facility: CLINIC | Age: 65
End: 2025-07-07
Payer: COMMERCIAL

## 2025-07-07 DIAGNOSIS — M25.511 ACUTE PAIN OF RIGHT SHOULDER: Primary | ICD-10-CM

## 2025-07-07 DIAGNOSIS — Z98.890 S/P RIGHT ROTATOR CUFF REPAIR: ICD-10-CM

## 2025-07-07 PROCEDURE — 97112 NEUROMUSCULAR REEDUCATION: CPT

## 2025-07-07 PROCEDURE — 97110 THERAPEUTIC EXERCISES: CPT

## 2025-07-07 NOTE — PROGRESS NOTES
Daily Note     Today's date: 2025  Patient name: Efren Connell  : 1960  MRN: [id number]  Referring provider: Rio Coffey*  Dx:   Encounter Diagnosis     ICD-10-CM    1. Acute pain of right shoulder  M25.511       2. S/P right rotator cuff repair  Z98.890           Start Time: 09  Stop Time: 1012  Total time in clinic (min): 40 minutes    Subjective: Pt reports that his shoulder was more sore after his last session but overall felt fine the following days. Pt continues to work on his AROM and feels like his shoulder is getting stronger. Pt was also working on house projects that makes him sore but doesn't increase his pain.       Objective: See treatment diary below      Assessment: Tolerated treatment well. Patient demonstrated fatigue post treatment, exhibited good technique with therapeutic exercises, and would benefit from continued PT      Plan: Continue per plan of care.  Progress treatment as tolerated.          Insurance:  AMA/CMS Eval/ Re-eval Auth #/ Referral # Total units or visits Start date  Expiration date KX? Visit limitation?  PT only or  PT+OT? Co-Insurance   AMA 25 NO AUTH  25  90 PCY  $0    3/17/25            4/21/25            5/22/25            6/30/25             POC Start Date POC Expiration Date Signed POC?   25 pending   3/17/25 5/12/25 pending   25 pending   5/22/25 7/3/25 pending   25 pending      Date               Visits/Units: NO AUTH Used               Authed: NO AUTH Remaining                   Precautions: RTC Repair Protocol  Access Code: [email or web address]  URL: [email or web address]  Date: 2025  Prepared by: Luis Armando Whittaker    Exercises  - Seated Scapular Retraction  - 3-4 x daily - 7 x weekly - 3 sets - 10 reps  - Seated Shoulder Flexion Towel Slide at Table Top  - 2-3 x daily - 7 x weekly - 2-3 sets - 10 reps - 3 hold  - Seated Elbow Flexion and Extension AROM  - 2-3 x daily - 7 x weekly  - 3 sets - 10 reps  - Flexion-Extension Shoulder Pendulum with Table Support  - 2-3 x daily - 7 x weekly - 3 sets - 10 reps  - Supine Shoulder External Rotation with Dowel  - 2-3 x daily - 7 x weekly - 2-3 sets - 10 reps - 3 hold      Date 7/7 7/3 6/30 6/23 6/19 6/16   Visit Number [phone]   Manuals         Shoulder PROM         GH mobilizations                           Neuro Re-Ed          Scap retr         RTC isos         Prone horiz abd in ER 30x 20x       Standing GH horiz abd         Prone rows    Upright rows orange 2x10 Upright rows black 2x10    Ball wall circles 120 flex 30x cw/ccw (2x)    90 abd 2x 30 cw/ccw 110 flex 30x cw.ccw       SL ER  No weifht 20x  1lb 2x10  No weight 2x10    W/3lbs 2x10     SL Abd 2x10, 0lbs 2x10 0lb  SL no weight 1x10, 2lbs 2x10 SL 2lbs 2x10 SL no weight 1x10    SL 2lbs 2x10   Standing GH flex Flex/scap/abd  15x ea in mirror Flex/scap/abd  15x ea in mirror       Supine ABC    Standing RTB 1x 2 ways  Standing RTB 1x 2 ways   Standing GH abd & ER    Horiz abd orange 1x10 normal, 1x10 w/contra press Horiz abd orange 2x10    90/90 ER table supported         bodyblade     2x30s @90 flex Neutral 2x30s  1x90 flex 30s   D2 at wall Supine 2lbs 2x10 Supine 1lb  2x10  Supine w/1lb 2x10  Supine 2x10 w/VC   Standing GH ext         Ther Ex         pendulums         Table slides         Supine cane ER PROM         Supine D2         Elbow AROM         Wrist extensor str         Supine GH flex PROM         Supine flex     W/cane 2x10    Supine 2x10 w/3lbs Supine 3x10 w/3lbs    Standing flex 2lbs 2x10   Standing functional ER    ER & IR 2x10 ea ER into IR 2x10 ER into IR 2x10   GH flex into horiz abd         Doorway pec str 10x10s        Standing IR BTB str    W/SOS 2x10    Functional ER str w/SOS 2x10 W/SOS 2x10, 5s hold W/SOS 2x10, 5s hold   Standing GH abd PROM  Ball slides 30x       pulleys         BTB str w/SOS 2x10, 5s hold 4x30s       Ther Activity                           Gait  Training                           Modalities

## 2025-07-10 ENCOUNTER — OFFICE VISIT (OUTPATIENT)
Dept: PHYSICAL THERAPY | Facility: CLINIC | Age: 65
End: 2025-07-10
Payer: COMMERCIAL

## 2025-07-10 DIAGNOSIS — M25.511 ACUTE PAIN OF RIGHT SHOULDER: Primary | ICD-10-CM

## 2025-07-10 DIAGNOSIS — Z98.890 S/P RIGHT ROTATOR CUFF REPAIR: ICD-10-CM

## 2025-07-10 PROCEDURE — 97112 NEUROMUSCULAR REEDUCATION: CPT

## 2025-07-10 PROCEDURE — 97110 THERAPEUTIC EXERCISES: CPT

## 2025-07-10 NOTE — PROGRESS NOTES
Daily Note     Today's date: 7/10/2025  Patient name: Efren Connell  : 1960  MRN: [id number]  Referring provider: Rio Coffey*  Dx:   Encounter Diagnosis     ICD-10-CM    1. Acute pain of right shoulder  M25.511       2. S/P right rotator cuff repair  Z98.890           Start Time: 09  Stop Time: 1010  Total time in clinic (min): 38 minutes    Subjective: Pt states that his shoulder was definitely more sore after the last few sessions, feels the discomfort on the side of his arm as well as on the top of his shoulder. Pt denies any significant changes.       Objective: See treatment diary below      Assessment: Tolerated treatment well. Patient demonstrated fatigue post treatment, exhibited good technique with therapeutic exercises, and would benefit from continued PT      Plan: Continue per plan of care.  Progress treatment as tolerated.          Insurance:  AMA/CMS Eval/ Re-eval Auth #/ Referral # Total units or visits Start date  Expiration date KX? Visit limitation?  PT only or  PT+OT? Co-Insurance   AMA 25 NO AUTH  25  90 PCY  $0    3/17/25            4/21/25            5/22/25            6/30/25             POC Start Date POC Expiration Date Signed POC?   25 pending   3/17/25 5/12/25 pending   25 pending   5/22/25 7/3/25 pending   25 pending      Date               Visits/Units: NO AUTH Used               Authed: NO AUTH Remaining                   Precautions: RTC Repair Protocol  Access Code: [email or web address]  URL: [email or web address]  Date: 2025  Prepared by: Luis Armando Whittaker    Exercises  - Seated Scapular Retraction  - 3-4 x daily - 7 x weekly - 3 sets - 10 reps  - Seated Shoulder Flexion Towel Slide at Table Top  - 2-3 x daily - 7 x weekly - 2-3 sets - 10 reps - 3 hold  - Seated Elbow Flexion and Extension AROM  - 2-3 x daily - 7 x weekly - 3 sets - 10 reps  - Flexion-Extension Shoulder Pendulum with Table Support   - 2-3 x daily - 7 x weekly - 3 sets - 10 reps  - Supine Shoulder External Rotation with Dowel  - 2-3 x daily - 7 x weekly - 2-3 sets - 10 reps - 3 hold      Date 7/10 7/7 7/3 6/30 6/23 6/19 6/16   Visit Number [phone]   Manuals          Shoulder PROM          GH mobilizations                              Neuro Re-Ed           Scap retr          RTC isos          Prone horiz abd in ER 2x10 30x 20x       Prone I, T, Y 1x15 I & T only         Standing GH horiz abd          Prone rows     Upright rows orange 2x10 Upright rows black 2x10    Ball wall circles  120 flex 30x cw/ccw (2x)    90 abd 2x 30 cw/ccw 110 flex 30x cw.ccw       SL ER   No weifht 20x  1lb 2x10  No weight 2x10    W/3lbs 2x10     SL Abd  2x10, 0lbs 2x10 0lb  SL no weight 1x10, 2lbs 2x10 SL 2lbs 2x10 SL no weight 1x10    SL 2lbs 2x10   Standing GH flex Abd 2x10 in mirror Flex/scap/abd  15x ea in mirror Flex/scap/abd  15x ea in mirror       Supine ABC     Standing RTB 1x 2 ways  Standing RTB 1x 2 ways   Standing GH abd & ER     Horiz abd orange 1x10 normal, 1x10 w/contra press Horiz abd orange 2x10    90/90 ER table supported          bodyblade      2x30s @90 flex Neutral 2x30s  1x90 flex 30s   Serratus flexion YTB 3x10         D2 at wall  Supine 2lbs 2x10 Supine 1lb  2x10  Supine w/1lb 2x10  Supine 2x10 w/VC   Standing GH ext          Ther Ex          pendulums          Table slides          Supine cane ER PROM          Supine D2          Elbow AROM          Wrist extensor str          Supine GH flex PROM          Supine flex      W/cane 2x10    Supine 2x10 w/3lbs Supine 3x10 w/3lbs    Standing flex 2lbs 2x10   Standing functional ER     ER & IR 2x10 ea ER into IR 2x10 ER into IR 2x10   GH flex into horiz abd          Doorway pec str 10x10s 10x10s        Standing IR BTB str     W/SOS 2x10    Functional ER str w/SOS 2x10 W/SOS 2x10, 5s hold W/SOS 2x10, 5s hold   Standing GH abd PROM   Ball slides 30x       pulleys          BTB str w/SOS 1x10  across back  1x10 up the back  2x10, 5s hold 4x30s       Ther Activity                              Gait Training                              Modalities

## 2025-07-14 ENCOUNTER — APPOINTMENT (OUTPATIENT)
Dept: PHYSICAL THERAPY | Facility: CLINIC | Age: 65
End: 2025-07-14
Payer: COMMERCIAL

## 2025-07-17 ENCOUNTER — APPOINTMENT (OUTPATIENT)
Dept: PHYSICAL THERAPY | Facility: CLINIC | Age: 65
End: 2025-07-17
Payer: COMMERCIAL

## 2025-07-21 ENCOUNTER — OFFICE VISIT (OUTPATIENT)
Dept: PHYSICAL THERAPY | Facility: CLINIC | Age: 65
End: 2025-07-21
Payer: COMMERCIAL

## 2025-07-21 DIAGNOSIS — Z98.890 S/P RIGHT ROTATOR CUFF REPAIR: ICD-10-CM

## 2025-07-21 DIAGNOSIS — M25.511 ACUTE PAIN OF RIGHT SHOULDER: Primary | ICD-10-CM

## 2025-07-21 PROCEDURE — 97140 MANUAL THERAPY 1/> REGIONS: CPT

## 2025-07-21 PROCEDURE — 97112 NEUROMUSCULAR REEDUCATION: CPT

## 2025-07-21 NOTE — PROGRESS NOTES
Daily Note     Today's date: 2025  Patient name: Efren Connell  : 1960  MRN: [id number]  Referring provider: Rio Coffey*  Dx:   Encounter Diagnosis     ICD-10-CM    1. Acute pain of right shoulder  M25.511       2. S/P right rotator cuff repair  Z98.890           Start Time: 933  Stop Time: 1015  Total time in clinic (min): 42 minutes    Subjective: Pt states not being as active with his exercises while he was up in [place] over the last 1-2 weeks. He was sore after his last session with the increased weights but has felt very good recently. After his trip from [place] he has an acute exacerbation of L foot pain due to possible plantar fasciitis.       Objective: See treatment diary below      Assessment: Tolerated treatment well. Patient demonstrated fatigue post treatment, exhibited good technique with therapeutic exercises, and would benefit from continued PT. Pt continues to have restrictions with ROM over 90 degrees of flexion & abduction without scapular hiking. Pt is able to work over shoulder height however fatigues quickly leading to his compensations.       Plan: Continue per plan of care.  Progress treatment as tolerated.          Insurance:  AMA/CMS Eval/ Re-eval Auth #/ Referral # Total units or visits Start date  Expiration date KX? Visit limitation?  PT only or  PT+OT? Co-Insurance   AMA 25 NO AUTH  25  90 PCY  $0    3/17/25            4/21/25            5/22/25            6/30/25             POC Start Date POC Expiration Date Signed POC?   25 pending   3/17/25 5/12/25 pending   25 pending   5/22/25 7/3/25 pending   25 pending      Date               Visits/Units: NO AUTH Used               Authed: NO AUTH Remaining                   Precautions: RTC Repair Protocol  Access Code: [email or web address]  URL: [email or web address]  Date: 2025  Prepared by: Luis Armando Whittaker    Exercises  - Seated Scapular Retraction  -  3-4 x daily - 7 x weekly - 3 sets - 10 reps  - Seated Shoulder Flexion Towel Slide at Table Top  - 2-3 x daily - 7 x weekly - 2-3 sets - 10 reps - 3 hold  - Seated Elbow Flexion and Extension AROM  - 2-3 x daily - 7 x weekly - 3 sets - 10 reps  - Flexion-Extension Shoulder Pendulum with Table Support  - 2-3 x daily - 7 x weekly - 3 sets - 10 reps  - Supine Shoulder External Rotation with Dowel  - 2-3 x daily - 7 x weekly - 2-3 sets - 10 reps - 3 hold      Date 7/21 7/10 7/7 7/3 6/30 6/23   Visit Number [phone]   Manuals         Shoulder PROM ER        GH mobilizations Post gr. III                          Neuro Re-Ed          Scap retr         RTC isos         Prone horiz abd in ER 2x10 2x10 30x 20x     Prone I, T, Y  1x15 I & T only       Standing GH horiz abd         Prone rows Upright row agus 4.0 3x10     Upright rows orange 2x10   Ball wall circles   120 flex 30x cw/ccw (2x)    90 abd 2x 30 cw/ccw 110 flex 30x cw.ccw     SL ER    No weifht 20x  1lb 2x10  No weight 2x10    W/3lbs 2x10   SL Abd   2x10, 0lbs 2x10 0lb  SL no weight 1x10, 2lbs 2x10   Standing GH flex Abd 2x10 in mirror Abd 2x10 in mirror Flex/scap/abd  15x ea in mirror Flex/scap/abd  15x ea in mirror     Wall walks YTB horiz 3 laps  YTB vert 5 laps        Supine ABC      Standing RTB 1x 2 ways   Standing GH abd & ER      Horiz abd orange 1x10 normal, 1x10 w/contra press   90/90 ER table supported         bodyblade         Serratus flexion YTB 2x10 YTB 3x10       D2 at wall Supine 2lbs 2x10  Supine 2lbs 2x10 Supine 1lb  2x10  Supine w/1lb 2x10   Standing GH ext         Ther Ex         pendulums         Table slides         Supine cane ER PROM         Supine D2         Elbow AROM         Wrist extensor str         Supine GH flex PROM         Supine flex         Standing functional ER      ER & IR 2x10 ea   GH flex into horiz abd         Doorway pec str 10x10s 10x10s 10x10s      Standing IR BTB str      W/SOS 2x10    Functional ER str w/SOS  2x10   Standing GH abd PROM    Ball slides 30x     pulleys         BTB str w/SOS  1x10 across back  1x10 up the back  2x10, 5s hold 4x30s     Ther Activity                           Gait Training                           Modalities

## 2025-07-22 ENCOUNTER — OFFICE VISIT (OUTPATIENT)
Dept: PHYSICAL THERAPY | Facility: CLINIC | Age: 65
End: 2025-07-22
Payer: COMMERCIAL

## 2025-07-22 DIAGNOSIS — Z98.890 S/P RIGHT ROTATOR CUFF REPAIR: ICD-10-CM

## 2025-07-22 DIAGNOSIS — M25.511 ACUTE PAIN OF RIGHT SHOULDER: Primary | ICD-10-CM

## 2025-07-22 PROCEDURE — 97112 NEUROMUSCULAR REEDUCATION: CPT

## 2025-07-22 PROCEDURE — 97140 MANUAL THERAPY 1/> REGIONS: CPT

## 2025-07-22 NOTE — PROGRESS NOTES
Daily Note     Today's date: 2025  Patient name: Efren Connell  : 1960  MRN: [id number]  Referring provider: Rio Coffey*  Dx:   Encounter Diagnosis     ICD-10-CM    1. Acute pain of right shoulder  M25.511       2. S/P right rotator cuff repair  Z98.890           Start Time: 1704  Stop Time: 1747  Total time in clinic (min): 43 minutes    Subjective: Pt states that his shoulder is feeling good today, he doesn't feel any increased pain or soreness. Pt states not doing any exercises since he was at PT yesterday. No new complaints.       Objective: See treatment diary below      Assessment: Tolerated treatment well. Patient demonstrated fatigue post treatment, exhibited good technique with therapeutic exercises, and would benefit from continued PT      Plan: Continue per plan of care.  Progress treatment as tolerated.          Insurance:  AMA/CMS Eval/ Re-eval Auth #/ Referral # Total units or visits Start date  Expiration date KX? Visit limitation?  PT only or  PT+OT? Co-Insurance   AMA 25 NO AUTH  25  90 PCY  $0    3/17/25            4/21/25            5/22/25            6/30/25             POC Start Date POC Expiration Date Signed POC?   25 pending   3/17/25 5/12/25 pending   25 pending   5/22/25 7/3/25 pending   25 pending      Date               Visits/Units: NO AUTH Used               Authed: NO AUTH Remaining                   Precautions: RTC Repair Protocol  Access Code: [email or web address]  URL: [email or web address]  Date: 2025  Prepared by: Luis Armando Whittaker    Exercises  - Seated Scapular Retraction  - 3-4 x daily - 7 x weekly - 3 sets - 10 reps  - Seated Shoulder Flexion Towel Slide at Table Top  - 2-3 x daily - 7 x weekly - 2-3 sets - 10 reps - 3 hold  - Seated Elbow Flexion and Extension AROM  - 2-3 x daily - 7 x weekly - 3 sets - 10 reps  - Flexion-Extension Shoulder Pendulum with Table Support  - 2-3 x daily - 7  x weekly - 3 sets - 10 reps  - Supine Shoulder External Rotation with Dowel  - 2-3 x daily - 7 x weekly - 2-3 sets - 10 reps - 3 hold      Date 7/22 7/21 7/10 7/7 7/3 6/30   Visit Number [phone]   Manuals         Shoulder PROM ER ER       GH mobilizations Post gr. III Post gr. III                         Neuro Re-Ed          Scap retr         RTC isos         Prone horiz abd in ER 1x10 2x10 2x10 30x 20x    Prone I, T, Y T, Y 1x10 only  1x15 I & T only      Standing GH horiz abd W/contra press black 2x10        TRX rows W/eccentric lowering 3x10        Prone rows  Upright row agus 4.0 3x10       Ball wall circles    120 flex 30x cw/ccw (2x)    90 abd 2x 30 cw/ccw 110 flex 30x cw.ccw    SL ER     No weifht 20x  1lb 2x10    SL Abd    2x10, 0lbs 2x10 0lb    Standing GH flex  Abd 2x10 in mirror Abd 2x10 in mirror Flex/scap/abd  15x ea in mirror Flex/scap/abd  15x ea in mirror    Wall walks  YTB horiz 3 laps  YTB vert 5 laps       Supine ABC         Standing GH abd & ER         90/90 ER table supported 1x15  1x15 w/2lbs    90/90 supported IR RTB 2x10        bodyblade         Serratus flexion YTB w/varying tension 1x10 YTB 2x10 YTB 3x10      D2 at wall Supine 3lbs 2x10 Supine 2lbs 2x10  Supine 2lbs 2x10 Supine 1lb  2x10    Standing GH ext         Ther Ex         pendulums         Table slides         Supine cane ER PROM         Supine D2         Elbow AROM         Wrist extensor str         Supine GH flex PROM         Supine flex         Standing functional ER         GH flex into horiz abd         Doorway pec str  10x10s 10x10s 10x10s     Standing IR BTB str         Standing GH abd PROM     Ball slides 30x    pulleys         BTB str w/SOS   1x10 across back  1x10 up the back  2x10, 5s hold 4x30s    Ther Activity                           Gait Training                           Modalities

## 2025-07-23 ENCOUNTER — APPOINTMENT (OUTPATIENT)
Dept: PHYSICAL THERAPY | Facility: CLINIC | Age: 65
End: 2025-07-23
Payer: COMMERCIAL

## 2025-07-24 ENCOUNTER — APPOINTMENT (OUTPATIENT)
Dept: PHYSICAL THERAPY | Facility: CLINIC | Age: 65
End: 2025-07-24
Payer: COMMERCIAL

## 2025-07-28 ENCOUNTER — OFFICE VISIT (OUTPATIENT)
Dept: PHYSICAL THERAPY | Facility: CLINIC | Age: 65
End: 2025-07-28
Payer: COMMERCIAL

## 2025-07-28 DIAGNOSIS — Z98.890 S/P RIGHT ROTATOR CUFF REPAIR: ICD-10-CM

## 2025-07-28 DIAGNOSIS — M25.511 ACUTE PAIN OF RIGHT SHOULDER: Primary | ICD-10-CM

## 2025-07-28 PROCEDURE — 97112 NEUROMUSCULAR REEDUCATION: CPT

## 2025-07-31 ENCOUNTER — OFFICE VISIT (OUTPATIENT)
Dept: PHYSICAL THERAPY | Facility: CLINIC | Age: 65
End: 2025-07-31
Payer: COMMERCIAL

## 2025-07-31 DIAGNOSIS — M25.511 ACUTE PAIN OF RIGHT SHOULDER: Primary | ICD-10-CM

## 2025-07-31 DIAGNOSIS — Z98.890 S/P RIGHT ROTATOR CUFF REPAIR: ICD-10-CM

## 2025-07-31 PROCEDURE — 97140 MANUAL THERAPY 1/> REGIONS: CPT

## 2025-07-31 PROCEDURE — 97112 NEUROMUSCULAR REEDUCATION: CPT

## 2025-08-11 ENCOUNTER — OFFICE VISIT (OUTPATIENT)
Dept: PHYSICAL THERAPY | Facility: CLINIC | Age: 65
End: 2025-08-11
Payer: COMMERCIAL

## 2025-08-13 ENCOUNTER — OFFICE VISIT (OUTPATIENT)
Dept: PHYSICAL THERAPY | Facility: CLINIC | Age: 65
End: 2025-08-13
Payer: COMMERCIAL

## 2025-08-20 ENCOUNTER — OFFICE VISIT (OUTPATIENT)
Dept: PHYSICAL THERAPY | Facility: CLINIC | Age: 65
End: 2025-08-20
Payer: COMMERCIAL

## 2025-08-20 DIAGNOSIS — Z98.890 S/P RIGHT ROTATOR CUFF REPAIR: Primary | ICD-10-CM

## 2025-08-20 DIAGNOSIS — M25.511 ACUTE PAIN OF RIGHT SHOULDER: ICD-10-CM

## 2025-08-20 PROCEDURE — 97110 THERAPEUTIC EXERCISES: CPT

## 2025-08-20 PROCEDURE — 97112 NEUROMUSCULAR REEDUCATION: CPT

## (undated) DEVICE — GLOVE INDICATOR PI UNDERGLOVE SZ 6.5 BLUE

## (undated) DEVICE — ASTOUND IMPERVIOUS SURGICAL GOWN: Brand: CONVERTORS

## (undated) DEVICE — PACK ARTHROSCOPY

## (undated) DEVICE — NEEDLE SUT SCORPION MULTIFIRE

## (undated) DEVICE — TUBING SUCTION 5MM X 12 FT

## (undated) DEVICE — BLADE SHAVER TORPEDO 4MM 13CM  COOLCUT

## (undated) DEVICE — VAPR COOLPULSE 90 ELECTRODE WITH HAND CONTROLS 90 DEGREES SUCTION WITH INTEGRATED HANDPIECE: Brand: VAPR COOLPULSE

## (undated) DEVICE — TUBING ARTHROSCOPIC WAVE  MAIN PUMP

## (undated) DEVICE — SUT FIBERWIRE #2 1/2 CIRCLE T-5 38IN AR-7200

## (undated) DEVICE — 3M™ TEGADERM™ TRANSPARENT FILM DRESSING FRAME STYLE, 1626W, 4 IN X 4-3/4 IN (10 CM X 12 CM), 50/CT 4CT/CASE: Brand: 3M™ TEGADERM™

## (undated) DEVICE — BLADE SHAVER EXCALIBUR 4MM 13CM COOLCUT

## (undated) DEVICE — GLOVE SRG BIOGEL 7.5

## (undated) DEVICE — GLOVE SRG BIOGEL 6.5

## (undated) DEVICE — CHLORAPREP HI-LITE 26ML ORANGE

## (undated) DEVICE — POSITIONER TRIMANO LIMB BEACH CHAIR

## (undated) DEVICE — CANNULA BUTTON 8 X 30MM PASSPORT

## (undated) DEVICE — OCCLUSIVE GAUZE STRIP,3% BISMUTH TRIBROMOPHENATE IN PETROLATUM BLEND: Brand: XEROFORM

## (undated) DEVICE — GLOVE INDICATOR PI UNDERGLOVE SZ 7.5 BLUE

## (undated) DEVICE — TIBURON BEACH CHAIR SHOULDER DRAPE: Brand: CONVERTORS

## (undated) DEVICE — CANNULA 5.75 X 70MM BARREL SHAPED BOWL

## (undated) DEVICE — DUAL SPIKE ADAPTER: Brand: CONMED

## (undated) DEVICE — INTENDED FOR TISSUE SEPARATION, AND OTHER PROCEDURES THAT REQUIRE A SHARP SURGICAL BLADE TO PUNCTURE OR CUT.: Brand: BARD-PARKER SAFETY BLADES SIZE 11, STERILE

## (undated) DEVICE — BURR  OVAL 4MM 13CM 8 FLUTE COOLCUT

## (undated) DEVICE — GAUZE SPONGES,16 PLY: Brand: CURITY